# Patient Record
Sex: FEMALE | Race: WHITE | NOT HISPANIC OR LATINO | Employment: FULL TIME | URBAN - METROPOLITAN AREA
[De-identification: names, ages, dates, MRNs, and addresses within clinical notes are randomized per-mention and may not be internally consistent; named-entity substitution may affect disease eponyms.]

---

## 2017-01-03 RX ORDER — LEVOTHYROXINE SODIUM 88 UG/1
TABLET ORAL
Qty: 30 TABLET | Refills: 0 | Status: SHIPPED | OUTPATIENT
Start: 2017-01-03 | End: 2017-01-06 | Stop reason: SDUPTHER

## 2017-01-03 RX ORDER — VALACYCLOVIR HYDROCHLORIDE 1 G/1
TABLET, FILM COATED ORAL
Qty: 90 TABLET | Refills: 0 | Status: SHIPPED | OUTPATIENT
Start: 2017-01-03 | End: 2017-03-25 | Stop reason: SDUPTHER

## 2017-01-06 ENCOUNTER — TELEPHONE (OUTPATIENT)
Dept: FAMILY MEDICINE CLINIC | Facility: CLINIC | Age: 47
End: 2017-01-06

## 2017-01-06 RX ORDER — LEVOTHYROXINE SODIUM 112 UG/1
112 TABLET ORAL DAILY
Qty: 30 TABLET | Refills: 1 | Status: SHIPPED | OUTPATIENT
Start: 2017-01-06 | End: 2017-01-31 | Stop reason: SDUPTHER

## 2017-01-06 NOTE — TELEPHONE ENCOUNTER
Yes, will increase her dose from 88 to 112 and then I want to see her in 6 weeks please for an appointment and recheck.  New script sent in

## 2017-01-06 NOTE — TELEPHONE ENCOUNTER
----- Message from Tosha Garza sent at 1/6/2017  9:58 AM EST -----  Contact: KRISH INMAN   PATIENT CALLED TO ADVISE SHE SAW ONCOLOGIST HER THYROID LEVEL HAS CHANGED NOW 11.91 SHE IS QUESTIONING IF SHE SHOULD CHANGE HER MEDICATION I HAVE ORDERED THE LABS AND OFFICE NOTES FROM   [facility]RISTY JO IN [place] AT  SHOULD BE SENT OVER SHORTLY PATIENT CAN BE REACHED  [phone]

## 2017-01-16 ENCOUNTER — OFFICE VISIT (OUTPATIENT)
Dept: OBSTETRICS AND GYNECOLOGY | Facility: CLINIC | Age: 47
End: 2017-01-16

## 2017-01-16 VITALS
BODY MASS INDEX: 21.43 KG/M2 | SYSTOLIC BLOOD PRESSURE: 112 MMHG | HEIGHT: 65 IN | DIASTOLIC BLOOD PRESSURE: 70 MMHG | WEIGHT: 128.6 LBS

## 2017-01-16 DIAGNOSIS — Z79.890 POST-MENOPAUSE ON HRT (HORMONE REPLACEMENT THERAPY): Primary | ICD-10-CM

## 2017-01-16 DIAGNOSIS — M85.80 OSTEOPENIA: ICD-10-CM

## 2017-01-16 DIAGNOSIS — N60.12 FIBROCYSTIC BREAST CHANGES, BILATERAL: ICD-10-CM

## 2017-01-16 DIAGNOSIS — N60.11 FIBROCYSTIC BREAST CHANGES, BILATERAL: ICD-10-CM

## 2017-01-16 DIAGNOSIS — Z01.419 ENCOUNTER FOR GYNECOLOGICAL EXAMINATION WITHOUT ABNORMAL FINDING: ICD-10-CM

## 2017-01-16 PROCEDURE — 99396 PREV VISIT EST AGE 40-64: CPT | Performed by: OBSTETRICS & GYNECOLOGY

## 2017-01-16 NOTE — MR AVS SNAPSHOT
Ivis Mcginnis   1/16/2017 1:00 PM   Office Visit    Dept Phone:  [phone]   Encounter #:  [id number]    Provider:  Jose Avila MD   Department:  [facility]                Your Full Care Plan              Today's Medication Changes          These changes are accurate as of: 1/16/17  1:46 PM.  If you have any questions, ask your nurse or doctor.               Stop taking medication(s)listed here:     PREMARIN 0.45 MG tablet   Generic drug:  estrogens (conjugated)   Stopped by:  Jose Avila MD                      Your Updated Medication List          This list is accurate as of: 1/16/17  1:46 PM.  Always use your most recent med list.                aspirin 81 MG chewable tablet       diclofenac 75 MG EC tablet   Commonly known as:  VOLTAREN   Take 1 tablet by mouth 2 (Two) Times a Day.       diphenhydrAMINE 25 mg capsule   Commonly known as:  BENADRYL       FLUZONE QUADRIVALENT 0.5 ML suspension prefilled syringe injection   Generic drug:  influenza vac split quad       gabapentin 600 MG tablet   Commonly known as:  NEURONTIN       levothyroxine 112 MCG tablet   Commonly known as:  SYNTHROID, LEVOTHROID   Take 1 tablet by mouth Daily.       LORATADINE-D 24HR  MG per 24 hr tablet   Generic drug:  loratadine-pseudoephedrine   TAKE ONE TABLET BY MOUTH ONCE DAILY       potassium phosphate (monobasic) 500 MG tablet   Commonly known as:  K-PHOS       topiramate 50 MG tablet   Commonly known as:  TOPAMAX   TAKE ONE TABLET BY MOUTH TWICE DAILY       valACYclovir 1000 MG tablet   Commonly known as:  VALTREX   TAKE ONE TABLET BY MOUTH ONCE DAILY       Vitamin D 2000 UNITS capsule               You Were Diagnosed With        Codes Comments    Post-menopause on HRT (hormone replacement therapy)    -  Primary ICD-10-CM: Z79.890  ICD-9-CM: V07.4     Osteopenia     ICD-10-CM: M85.80  ICD-9-CM: 733.90     Fibrocystic breast changes,  "bilateral     ICD-10-CM: N60.11, N60.12  ICD-9-CM: 610.1     Encounter for gynecological examination without abnormal finding     ICD-10-CM: Z01.419  ICD-9-CM: V72.31       Instructions     None    Patient Instructions History      Upcoming Appointments     Visit Type Date Time Department    ANNUAL 2017  1:00 PM [facility]      [facility] Signup     [facility] allows you to send messages to your doctor, view your test results, renew your prescriptions, schedule appointments, and more. To sign up, go to [employer] and click on the Sign Up Now link in the New User? box. Enter your [employer] Activation Code exactly as it appears below along with the last four digits of your Social Security Number and your Date of Birth () to complete the sign-up process. If you do not sign up before the expiration date, you must request a new code.    [employer] Activation Code: [email or web address]  Expires: 2017  1:46 PM    If you have questions, you can email [employer]@"Hey, Neighbor!" or call [phone] to talk to our [employer] staff. Remember, [employer] is NOT to be used for urgent needs. For medical emergencies, dial 911.               Other Info from Your Visit           Allergies     Pentamidine      Piperacillin Sod-tazobactam So      Zosin      Reason for Visit     Gynecologic Exam     Med Refill           Vital Signs     Blood Pressure Height Weight Body Mass Index Smoking Status       112/70 65\" (165.1 cm) 128 lb 9.6 oz (58.3 kg) 21.4 kg/m2 Never Smoker       Problems and Diagnoses Noted     Painful lumpy breasts    History of recurrent UTIs    Leukemia    Migraines    Bone disease    Need for postmenopausal hormone replacement    Encounter for routine gynecological examination          No Longer an Issue     Difficult or painful urination        "

## 2017-01-16 NOTE — LETTER
"2017     DOMITILA Hernandez  [facility] [address] [facility] [address]    Patient: Ivis Mcginnis   YOB: 1970   Date of Visit: 2017       Dear DOMITILA Hyman:    Thank you for referring Ivis Mcginnis to me for evaluation. Below are the relevant portions of my assessment and plan of care.    If you have questions, please do not hesitate to call me. I look forward to following Ivis along with you.         Sincerely,        DOROTHEA Avila MD        CC: No Recipients  DOROTHEA Avila MD  2017  1:53 PM  Signed     Chief Complaint   Patient presents with   • Gynecologic Exam   • Med Refill       Ivis Mcginnis is a 46 y.o. year old  presenting to be seen for her annual exam.  This patient has previously had an LAVH.  She is monitored for a history of acute myelogenous leukemia at the [facility].  She has a history of osteopenia of the hips and fibrocystic changes of the breast.  She is currently taking 0.9 mg of Premarin daily and is asymptomatic.    SCREENING TESTS    Year 2012 2016 2017 2019 2020   Age                         PAP Neg.                        HPV high risk                         Mammogram     benign                    NILO score                         Breast MRI                         Lipids                         Vitamin D                         Colonoscopy                         DEXA  Frax (hip/any)    osteopenia hip                     Ovarian Screen                           Enter the month test was performed.  If month not known, enter \"X'  · Black numbers = normal results  · Red numbers = abnormal results  · Black X = patient reported normal  · Red X - patient reported abnormal      Referred by:    Profession:    Other info:         History   Sexual Activity   • Sexual activity: Yes   • Partners: Male   • Birth control/ protection: Surgical "     Comment:     She would not like to be screened for STD's at today's exam.     She exercises regularly: yes.  She wears her seat belt: yes.  She has concerns about domestic violence: no.  She has noticed changes in height: no    GYN screening history:  · Last mammogram: was done on approximately 1/18/2016 and the result was: Birads II (Benign findings).  · Last DEXA: was done on approximately 6/15/2015 and the results were: osteopenia of hips.    No Additional Complaints Reported    The following portions of the patient's history were reviewed and updated as appropriate:vital signs and   She  does not have any pertinent problems on file.  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  Her family history includes Hypertension in her father; Kidney disease in her mother.  She  reports that she has never smoked. She does not have any smokeless tobacco history on file. She reports that she does not drink alcohol or use illicit drugs.  Current Outpatient Prescriptions   Medication Sig Dispense Refill   • Cholecalciferol (VITAMIN D) 2000 UNITS capsule Take  by mouth.     • LORATADINE-D 24HR  MG per 24 hr tablet TAKE ONE TABLET BY MOUTH ONCE DAILY 90 tablet 0   • potassium phosphate, monobasic, (K-PHOS) 500 MG tablet Take 500 mg by mouth 2 (two) times a day.     • valACYclovir (VALTREX) 1000 MG tablet TAKE ONE TABLET BY MOUTH ONCE DAILY 90 tablet 0   • aspirin 81 MG chewable tablet Chew 81 mg Every 3 (Three) Days.     • diclofenac (VOLTAREN) 75 MG EC tablet Take 1 tablet by mouth 2 (Two) Times a Day. 60 tablet 1   • diphenhydrAMINE (BENADRYL) 25 mg capsule Take 25 mg by mouth every 6 (six) hours as needed for itching.     • estrogens, conjugated, (PREMARIN) 0.9 MG tablet Take 1 tablet by mouth Daily. 90 tablet 3   • FLUZONE QUADRIVALENT 0.5 ML suspension prefilled  "syringe injection      • gabapentin (NEURONTIN) 600 MG tablet Take  by mouth 2 (two) times a day.     • levothyroxine (SYNTHROID, LEVOTHROID) 112 MCG tablet Take 1 tablet by mouth Daily. 30 tablet 1   • topiramate (TOPAMAX) 50 MG tablet TAKE ONE TABLET BY MOUTH TWICE DAILY (Patient taking differently: TAKE ONE TABLET by oral route daily) 180 tablet 0     No current facility-administered medications for this visit.      She is allergic to pentamidine and piperacillin sod-tazobactam so..    Review of Systems  A comprehensive review of systems was negative.  Constitutional: negative for fever, chills, activity change, appetite change, fatigue and unexpected weight change.  Respiratory: negative  Cardiovascular: negative  Gastrointestinal: negative  Genitourinary:negative  Musculoskeletal:positive for right chest wall pain  Behavioral/Psych: negative       Visit Vitals   • /70   • Ht 65\" (165.1 cm)   • Wt 128 lb 9.6 oz (58.3 kg)   • BMI 21.4 kg/m2       Physical Exam    General:  alert; cooperative; well developed; well nourished   Skin:  No suspicious lesions seen   Thyroid: normal to inspection and palpation   Lungs:  clear to auscultation bilaterally   Heart:  regular rate and rhythm, S1, S2 normal, no murmur, click, rub or gallop   Breasts:  Examined in supine position  Symmetric without masses or skin dimpling  Nipples normal without inversion, lesions or discharge  There are no palpable axillary nodes  Fibrocystic changes are present both breasts without a discrete mass   Abdomen: soft, non-tender; no masses  no umbilical or inginual hernias are present  no hepato-splenomegaly   Pelvis: Clinical staff was present for exam  External genitalia:  normal appearance of the external genitalia including Bartholin's and [place]'s glands.  Vaginal:  normal pink mucosa without prolapse or lesions.  Cervix:  absent.  Uterus:  absent.  Adnexa:  normal bimanual exam of the adnexa.  Rectal:  anus visually normal appearing. " recto-vaginal exam unremarkable and confirms findings;     Lab Review   No data reviewed    Imaging  Mammogram results and DEXA         ASSESSMENT  Problems Addressed this Visit        Musculoskeletal and Integument    Osteopenia       Genitourinary    Post-menopause on HRT (hormone replacement therapy) - Primary    Relevant Medications    estrogens, conjugated, (PREMARIN) 0.9 MG tablet    Other Relevant Orders    DEXA Bone Density Axial       Other    Fibrocystic breast changes, bilateral      Other Visit Diagnoses     Encounter for gynecological examination without abnormal finding        Relevant Orders    Liquid-based Pap Smear, Screening    DEXA Bone Density Axial          PLAN    Medications prescribed this encounter:    New Medications Ordered This Visit   Medications   • estrogens, conjugated, (PREMARIN) 0.9 MG tablet     Sig: Take 1 tablet by mouth Daily.     Dispense:  90 tablet     Refill:  3   · Pap test done  · DEXA ordered for June 27,2017  · Calcium, 600 mg/ Vit. D, 400 IU daily; regular weight-bearing exercise  · Follow up: 12 month(s)  *Please note that portions of this documentation may have been completed with a voice recognition program.  Efforts were made to edit this dictation, but occasional words may have been mistranscribed.       This note was electronically signed.    DOROTHEA Avila MD  January 16, 2017  1:49 PM

## 2017-01-24 ENCOUNTER — OFFICE VISIT (OUTPATIENT)
Dept: FAMILY MEDICINE CLINIC | Facility: CLINIC | Age: 47
End: 2017-01-24

## 2017-01-24 VITALS
DIASTOLIC BLOOD PRESSURE: 66 MMHG | WEIGHT: 129 LBS | RESPIRATION RATE: 18 BRPM | BODY MASS INDEX: 21.47 KG/M2 | TEMPERATURE: 97.8 F | HEART RATE: 76 BPM | SYSTOLIC BLOOD PRESSURE: 108 MMHG

## 2017-01-24 DIAGNOSIS — J06.9 ACUTE URI: ICD-10-CM

## 2017-01-24 DIAGNOSIS — J04.0 LARYNGITIS: ICD-10-CM

## 2017-01-24 DIAGNOSIS — J02.9 ACUTE PHARYNGITIS, UNSPECIFIED ETIOLOGY: Primary | ICD-10-CM

## 2017-01-24 LAB
EXPIRATION DATE: NORMAL
INTERNAL CONTROL: NORMAL
Lab: NORMAL
S PYO AG THROAT QL: NEGATIVE

## 2017-01-24 PROCEDURE — 99213 OFFICE O/P EST LOW 20 MIN: CPT | Performed by: PHYSICIAN ASSISTANT

## 2017-01-24 PROCEDURE — 87880 STREP A ASSAY W/OPTIC: CPT | Performed by: PHYSICIAN ASSISTANT

## 2017-01-24 RX ORDER — CEFDINIR 300 MG/1
300 CAPSULE ORAL 2 TIMES DAILY
Qty: 20 CAPSULE | Refills: 0 | Status: SHIPPED | OUTPATIENT
Start: 2017-01-24 | End: 2017-01-30

## 2017-01-24 NOTE — MR AVS SNAPSHOT
Ivis Mcginnis   1/24/2017 8:30 AM   Office Visit    Dept Phone:  [phone]   Encounter #:  [id number]    Provider:  GAYLE Acosta   Department:  [facility]                Your Full Care Plan              Today's Medication Changes          These changes are accurate as of: 1/24/17  9:00 AM.  If you have any questions, ask your nurse or doctor.               New Medication(s)Ordered:     cefdinir 300 MG capsule   Commonly known as:  OMNICEF   Take 1 capsule by mouth 2 (Two) Times a Day.         Stop taking medication(s)listed here:     FLUZONE QUADRIVALENT 0.5 ML suspension prefilled syringe injection   Generic drug:  influenza vac split quad           gabapentin 600 MG tablet   Commonly known as:  NEURONTIN                Where to Get Your Medications      These medications were sent to [facility] [address] [place] [address] [address] [place] [address] 7 AT [facility] [phone] [facility] [phone]   [address] [place] [address] [address] [address]     Phone:  [phone]     cefdinir 300 MG capsule                  Your Updated Medication List          This list is accurate as of: 1/24/17  9:00 AM.  Always use your most recent med list.                aspirin 81 MG chewable tablet       cefdinir 300 MG capsule   Commonly known as:  OMNICEF   Take 1 capsule by mouth 2 (Two) Times a Day.       diclofenac 75 MG EC tablet   Commonly known as:  VOLTAREN   Take 1 tablet by mouth 2 (Two) Times a Day.       diphenhydrAMINE 25 mg capsule   Commonly known as:  BENADRYL       estrogens (conjugated) 0.9 MG tablet   Commonly known as:  PREMARIN   Take 1 tablet by mouth Daily.       levothyroxine 112 MCG tablet   Commonly known as:  SYNTHROID, LEVOTHROID   Take 1 tablet by mouth Daily.       LORATADINE-D 24HR  MG per 24 hr tablet   Generic drug:  loratadine-pseudoephedrine   TAKE ONE TABLET BY MOUTH ONCE DAILY       potassium  phosphate (monobasic) 500 MG tablet   Commonly known as:  K-PHOS       topiramate 50 MG tablet   Commonly known as:  TOPAMAX   TAKE ONE TABLET BY MOUTH TWICE DAILY       valACYclovir 1000 MG tablet   Commonly known as:  VALTREX   TAKE ONE TABLET BY MOUTH ONCE DAILY       Vitamin D 2000 UNITS capsule               We Performed the Following     POC Rapid Strep A       You Were Diagnosed With        Codes Comments    Acute pharyngitis, unspecified etiology    -  Primary ICD-10-CM: J02.9  ICD-9-CM: 462     Laryngitis     ICD-10-CM: J04.0  ICD-9-CM: 464.00     Acute URI     ICD-10-CM: J06.9  ICD-9-CM: 465.9       Instructions     None    Patient Instructions History      Upcoming Appointments     Visit Type Date Time Department    FOLLOW UP 2017  8:30 AM [facility]    ANNUAL 1/15/2018  2:00 PM [facility]      [employer] Signup     [facility] allows you to send messages to your doctor, view your test results, renew your prescriptions, schedule appointments, and more. To sign up, go to [employer] and click on the Sign Up Now link in the New User? box. Enter your [facility] Activation Code exactly as it appears below along with the last four digits of your Social Security Number and your Date of Birth () to complete the sign-up process. If you do not sign up before the expiration date, you must request a new code.    [facility] Activation Code: [email or web address]  Expires: 2017  1:46 PM    If you have questions, you can email [email or web address] or call [phone] to talk to our [facility] staff. Remember, [facility] is NOT to be used for urgent needs. For medical emergencies, dial 911.               Other Info from Your Visit           Your Appointments     Chandana 15, 2018  2:00 PM [facility]   Annual with Jose Avila MD   [facility] (--)    [address] [facility] [address] [place] [id number]   [phone]               Allergies     Pentamidine      Piperacillin Sod-tazobactam So      Zosin      Reason for Visit     Laryngitis  was sick and getting over it this last week.     Cough           Vital Signs     Blood Pressure Pulse Temperature Respirations Weight Body Mass Index    108/66 76 97.8 °F (36.6 °C) 18 129 lb (58.5 kg) 21.47 kg/m2    Smoking Status                   Never Smoker           Problems and Diagnoses Noted     Acute sore throat    Acute upper respiratory infection    Laryngitis

## 2017-01-24 NOTE — LETTER
January 24, 2017     Patient: Ivis Mcginnis   YOB: 1970   Date of Visit: 1/24/2017       To Whom It May Concern:    It is my medical opinion that Ivis Mcginnis may return to work in two days.         Sincerely,        GAYLE Acosta    CC: No Recipients

## 2017-01-24 NOTE — PROGRESS NOTES
Farhan Mcginnis is a 46 y.o. female.     Laryngitis   This is a new problem. The current episode started in the past 7 days. The problem occurs constantly. The problem has been gradually worsening. Associated symptoms include congestion, coughing, fatigue, nausea, a sore throat and swollen glands. Pertinent negatives include no abdominal pain, anorexia, arthralgias, change in bowel habit, chest pain, chills, diaphoresis, fever, headaches, joint swelling, myalgias, neck pain, numbness, rash, urinary symptoms, vertigo, visual change, vomiting or weakness. The symptoms are aggravated by coughing. She has tried rest and drinking for the symptoms. The treatment provided no relief.   Cough   This is a new problem. The current episode started in the past 7 days. The problem has been gradually worsening. The cough is productive of sputum. Associated symptoms include a sore throat. Pertinent negatives include no chest pain, chills, ear congestion, ear pain, fever, headaches, heartburn, hemoptysis, myalgias, nasal congestion, postnasal drip, rash, rhinorrhea, shortness of breath, sweats, weight loss or wheezing. Nothing aggravates the symptoms. She has tried nothing for the symptoms.   Sore Throat    This is a new problem. The current episode started in the past 7 days. The problem has been gradually worsening. Neither side of throat is experiencing more pain than the other. There has been no fever. The pain is at a severity of 2/10. The pain is mild. Associated symptoms include congestion, coughing, a hoarse voice, swollen glands and trouble swallowing. Pertinent negatives include no abdominal pain, diarrhea, drooling, ear discharge, ear pain, headaches, plugged ear sensation, neck pain, shortness of breath, stridor or vomiting. She has had no exposure to strep or mono. She has tried cool liquids for the symptoms. The treatment provided no relief.        The following portions of the patient's history were reviewed  and updated as appropriate: allergies, current medications, past family history, past medical history, past social history, past surgical history and problem list.    Review of Systems   Constitutional: Positive for fatigue. Negative for chills, diaphoresis, fever and weight loss.   HENT: Positive for congestion, hoarse voice, sore throat and trouble swallowing. Negative for drooling, ear discharge, ear pain, hearing loss, nosebleeds, postnasal drip, rhinorrhea, sinus pressure and sneezing.    Eyes: Negative.    Respiratory: Positive for cough. Negative for hemoptysis, chest tightness, shortness of breath, wheezing and stridor.    Cardiovascular: Negative.  Negative for chest pain, palpitations and leg swelling.   Gastrointestinal: Positive for nausea. Negative for abdominal distention, abdominal pain, anal bleeding, anorexia, blood in stool, change in bowel habit, constipation, diarrhea, heartburn, rectal pain and vomiting.   Endocrine: Negative.  Negative for cold intolerance, heat intolerance, polydipsia, polyphagia and polyuria.   Genitourinary: Negative.  Negative for difficulty urinating, dysuria, flank pain, frequency, hematuria and urgency.   Musculoskeletal: Negative.  Negative for arthralgias, back pain, gait problem, joint swelling, myalgias, neck pain and neck stiffness.   Skin: Negative.  Negative for color change, pallor, rash and wound.   Allergic/Immunologic: Negative.  Negative for immunocompromised state.   Neurological: Negative for dizziness, vertigo, syncope, weakness, light-headedness, numbness and headaches.   Hematological: Negative.  Negative for adenopathy. Does not bruise/bleed easily.   Psychiatric/Behavioral: Negative.  Negative for behavioral problems, confusion, self-injury, sleep disturbance and suicidal ideas. The patient is not nervous/anxious.        Objective    Blood pressure 108/66, pulse 76, temperature 97.8 °F (36.6 °C), resp. rate 18, weight 129 lb (58.5 kg).     Physical  Exam   Constitutional: She is oriented to person, place, and time. She appears well-developed and well-nourished.   HENT:   Head: Normocephalic and atraumatic.   Right Ear: External ear and ear canal normal. Tympanic membrane is retracted. Tympanic membrane is not perforated, not erythematous and not bulging.   Left Ear: External ear and ear canal normal. Tympanic membrane is not perforated, not erythematous and not bulging.   Nose: Mucosal edema present. No rhinorrhea. Right sinus exhibits no maxillary sinus tenderness and no frontal sinus tenderness. Left sinus exhibits no maxillary sinus tenderness and no frontal sinus tenderness.   Mouth/Throat: Uvula is midline and mucous membranes are normal. Posterior oropharyngeal erythema present. No oropharyngeal exudate or posterior oropharyngeal edema.   Hoarse voice    Eyes: Conjunctivae and EOM are normal. Pupils are equal, round, and reactive to light.   Neck: Normal range of motion. Neck supple. No tracheal deviation present. No thyromegaly present.   Cardiovascular: Normal rate, regular rhythm, normal heart sounds and intact distal pulses.  Exam reveals no gallop and no friction rub.    No murmur heard.  Pulmonary/Chest: Effort normal and breath sounds normal. No respiratory distress. She has no wheezes. She has no rales. She exhibits no tenderness.   Abdominal: Soft. Bowel sounds are normal. She exhibits no distension and no mass. There is no tenderness. There is no rebound and no guarding. No hernia.   Lymphadenopathy:     She has no cervical adenopathy.   Neurological: She is alert and oriented to person, place, and time.   Skin: Skin is warm and dry.   Psychiatric: She has a normal mood and affect. Her behavior is normal. Judgment and thought content normal.   Nursing note and vitals reviewed.      Assessment/Plan   Ivis was seen today for laryngitis and cough.    Diagnoses and all orders for this visit:    Acute pharyngitis, unspecified etiology  -     POC  Rapid Strep A  -     cefdinir (OMNICEF) 300 MG capsule; Take 1 capsule by mouth 2 (Two) Times a Day.    Laryngitis    Acute URI    Strep screen negative   Continue symptomatic treatment as discussed. Due to hx of AML and propensity of frequent infections, may begin antibiotic in the next 24-48 hours if symptoms are not improving or if new or worsening symptoms develop. Pt agrees.

## 2017-01-30 ENCOUNTER — OFFICE VISIT (OUTPATIENT)
Dept: FAMILY MEDICINE CLINIC | Facility: CLINIC | Age: 47
End: 2017-01-30

## 2017-01-30 VITALS
RESPIRATION RATE: 16 BRPM | DIASTOLIC BLOOD PRESSURE: 74 MMHG | BODY MASS INDEX: 21.33 KG/M2 | TEMPERATURE: 97.5 F | WEIGHT: 128 LBS | SYSTOLIC BLOOD PRESSURE: 110 MMHG | HEART RATE: 74 BPM | HEIGHT: 65 IN

## 2017-01-30 DIAGNOSIS — E55.9 VITAMIN D DEFICIENCY: ICD-10-CM

## 2017-01-30 DIAGNOSIS — E03.9 HYPOTHYROIDISM, UNSPECIFIED TYPE: Primary | ICD-10-CM

## 2017-01-30 PROBLEM — J02.9 ACUTE PHARYNGITIS: Status: RESOLVED | Noted: 2017-01-24 | Resolved: 2017-01-30

## 2017-01-30 PROBLEM — J04.0 LARYNGITIS: Status: RESOLVED | Noted: 2017-01-24 | Resolved: 2017-01-30

## 2017-01-30 PROBLEM — J06.9 ACUTE URI: Status: RESOLVED | Noted: 2017-01-24 | Resolved: 2017-01-30

## 2017-01-30 LAB
25(OH)D3+25(OH)D2 SERPL-MCNC: 37.6 NG/ML
ALBUMIN SERPL-MCNC: 4.3 G/DL (ref 3.2–4.8)
ALBUMIN/GLOB SERPL: 1.5 G/DL (ref 1.5–2.5)
ALP SERPL-CCNC: 52 U/L (ref 25–100)
ALT SERPL-CCNC: 20 U/L (ref 7–40)
AST SERPL-CCNC: 21 U/L (ref 0–33)
BASOPHILS # BLD AUTO: 0.03 10*3/MM3 (ref 0–0.2)
BASOPHILS NFR BLD AUTO: 0.5 % (ref 0–1)
BILIRUB SERPL-MCNC: 0.3 MG/DL (ref 0.3–1.2)
BUN SERPL-MCNC: 18 MG/DL (ref 9–23)
BUN/CREAT SERPL: 22.5 (ref 7–25)
CALCIUM SERPL-MCNC: 10.4 MG/DL (ref 8.7–10.4)
CHLORIDE SERPL-SCNC: 103 MMOL/L (ref 99–109)
CO2 SERPL-SCNC: 31 MMOL/L (ref 20–31)
CREAT SERPL-MCNC: 0.8 MG/DL (ref 0.6–1.3)
DIFFERENTIAL COMMENT: NORMAL
EOSINOPHIL # BLD AUTO: 0.09 10*3/MM3 (ref 0.1–0.3)
EOSINOPHIL NFR BLD AUTO: 1.6 % (ref 0–3)
ERYTHROCYTE [DISTWIDTH] IN BLOOD BY AUTOMATED COUNT: 12.2 % (ref 11.3–14.5)
GLOBULIN SER CALC-MCNC: 2.8 GM/DL
GLUCOSE SERPL-MCNC: 82 MG/DL (ref 70–100)
HCT VFR BLD AUTO: 39.1 % (ref 34.5–44)
HGB BLD-MCNC: 13.5 G/DL (ref 11.5–15.5)
IMM GRANULOCYTES # BLD: 0.02 10*3/MM3 (ref 0–0.03)
IMM GRANULOCYTES NFR BLD: 0.3 % (ref 0–0.6)
LYMPHOCYTES # BLD AUTO: 2.07 10*3/MM3 (ref 0.6–4.8)
LYMPHOCYTES NFR BLD AUTO: 35.9 % (ref 24–44)
MCH RBC QN AUTO: 37.6 PG (ref 27–31)
MCHC RBC AUTO-ENTMCNC: 34.5 G/DL (ref 32–36)
MCV RBC AUTO: 108.9 FL (ref 80–99)
MONOCYTES # BLD AUTO: 0.55 10*3/MM3 (ref 0–1)
MONOCYTES NFR BLD AUTO: 9.5 % (ref 0–12)
NEUTROPHILS # BLD AUTO: 3 10*3/MM3 (ref 1.5–8.3)
NEUTROPHILS NFR BLD AUTO: 52.2 % (ref 41–71)
PLATELET # BLD AUTO: 192 10*3/MM3 (ref 150–450)
PLATELET BLD QL SMEAR: NORMAL
POTASSIUM SERPL-SCNC: 4.1 MMOL/L (ref 3.5–5.5)
PROT SERPL-MCNC: 7.1 G/DL (ref 5.7–8.2)
RBC # BLD AUTO: 3.59 10*6/MM3 (ref 3.89–5.14)
RBC MORPH BLD: NORMAL
SODIUM SERPL-SCNC: 140 MMOL/L (ref 132–146)
T4 FREE SERPL-MCNC: 1.48 NG/DL (ref 0.89–1.76)
TSH SERPL DL<=0.005 MIU/L-ACNC: 0.23 MIU/ML (ref 0.35–5.35)
WBC # BLD AUTO: 5.76 10*3/MM3 (ref 3.5–10.8)

## 2017-01-30 PROCEDURE — 99213 OFFICE O/P EST LOW 20 MIN: CPT | Performed by: FAMILY MEDICINE

## 2017-01-30 NOTE — PROGRESS NOTES
Subjective   Ivis Mcginnis is a 46 y.o. female.     History of Present Illness     last week she was ill but feeling better  She as wondering if her thyroid was off, even prior to this though  She has been jittery, no palps, hard to fall asleep  Very tired feeling as well  Seemed to be more prevalent at night    She wakes up at night feeling shaky, sweaty    We changed her dose in December with her level from [facility]    The following portions of the patient's history were reviewed and updated as appropriate: allergies, current medications, past family history, past medical history, past social history, past surgical history and problem list.    Review of Systems   Constitutional: Negative.        Objective   Physical Exam   Constitutional: She appears well-developed and well-nourished. No distress.   Neck: Thyromegaly present.   Cardiovascular: Normal rate, regular rhythm and normal heart sounds.    Pulmonary/Chest: Effort normal and breath sounds normal.   Psychiatric: She has a normal mood and affect. Her behavior is normal.   Nursing note and vitals reviewed.      Assessment/Plan   Ivis was seen today for hypothyroidism.    Diagnoses and all orders for this visit:    Hypothyroidism, unspecified type  -     TSH+Free T4  -     Comprehensive Metabolic Panel  -     CBC & Differential    Vitamin D deficiency  -     Vitamin D 25 Hydroxy    I am concerned that she is getting too much thyroid based on her symptoms, maybe the [facility] labs results were false and we incorrectly increased her synthroid. I am unable to find the lab results from Dec today.  Will recheck and f/u pending labs

## 2017-01-30 NOTE — MR AVS SNAPSHOT
Ivis Mcginnis   1/30/2017 12:15 PM   Office Visit    Dept Phone:  [phone]   Encounter #:  [id number]    Provider:  Sajan Odom MD   Department:  [facility]                Your Full Care Plan              Today's Medication Changes          These changes are accurate as of: 1/30/17 12:40 PM.  If you have any questions, ask your nurse or doctor.               Stop taking medication(s)listed here:     cefdinir 300 MG capsule   Commonly known as:  OMNICEF   Stopped by:  Sajan Odom MD           diclofenac 75 MG EC tablet   Commonly known as:  VOLTAREN   Stopped by:  Sajan Odom MD           diphenhydrAMINE 25 mg capsule   Commonly known as:  BENADRYL   Stopped by:  Sajan Odom MD                      Your Updated Medication List          This list is accurate as of: 1/30/17 12:40 PM.  Always use your most recent med list.                aspirin 81 MG chewable tablet       estrogens (conjugated) 0.9 MG tablet   Commonly known as:  PREMARIN   Take 1 tablet by mouth Daily.       levothyroxine 112 MCG tablet   Commonly known as:  SYNTHROID, LEVOTHROID   Take 1 tablet by mouth Daily.       LORATADINE-D 24HR  MG per 24 hr tablet   Generic drug:  loratadine-pseudoephedrine   TAKE ONE TABLET BY MOUTH ONCE DAILY       potassium phosphate (monobasic) 500 MG tablet   Commonly known as:  K-PHOS       topiramate 50 MG tablet   Commonly known as:  TOPAMAX   TAKE ONE TABLET BY MOUTH TWICE DAILY       valACYclovir 1000 MG tablet   Commonly known as:  VALTREX   TAKE ONE TABLET BY MOUTH ONCE DAILY       Vitamin D 2000 UNITS capsule               We Performed the Following     CBC & Differential     Comprehensive Metabolic Panel     TSH+Free T4     Vitamin D 25 Hydroxy       You Were Diagnosed With        Codes Comments    Hypothyroidism, unspecified type    -  Primary ICD-10-CM: E03.9  ICD-9-CM: 244.9     Vitamin D deficiency     ICD-10-CM: E55.9  ICD-9-CM:  "268.9       Instructions     None    Patient Instructions History      Upcoming Appointments     Visit Type Date Time Department    OFFICE VISIT 2017 12:15 PM [facility]    ANNUAL 1/15/2018  2:00 PM [facility]      [employer] Signup     [facility] [employer] allows you to send messages to your doctor, view your test results, renew your prescriptions, schedule appointments, and more. To sign up, go to [employer] and click on the Sign Up Now link in the New User? box. Enter your [employer] Activation Code exactly as it appears below along with the last four digits of your Social Security Number and your Date of Birth () to complete the sign-up process. If you do not sign up before the expiration date, you must request a new code.    [employer] Activation Code: [email or web address]  Expires: 2017  1:46 PM    If you have questions, you can email [email or web address] or call [phone] to talk to our [employer] staff. Remember, [employer] is NOT to be used for urgent needs. For medical emergencies, dial 911.               Other Info from Your Visit           Your Appointments     Chandana 15, 2018  2:00 PM EST   Annual with Jose Avila MD   [facility] (--)    [address] [place] [id number]   [phone]              Allergies     Pentamidine      Piperacillin Sod-tazobactam So      Zosin      Reason for Visit     Hypothyroidism           Vital Signs     Blood Pressure Pulse Temperature Respirations Height Weight    110/74 74 97.5 °F (36.4 °C) 16 65\" (165.1 cm) 128 lb (58.1 kg)    Body Mass Index Smoking Status                21.3 kg/m2 Never Smoker          Problems and Diagnoses Noted     Underactive thyroid    Vitamin D deficiency      No Longer an Issue     Acute sore throat    Acute upper respiratory infection    Laryngitis    Sore throat        "

## 2017-01-31 RX ORDER — LEVOTHYROXINE SODIUM 0.1 MG/1
100 TABLET ORAL DAILY
Qty: 30 TABLET | Refills: 1 | Status: SHIPPED | OUTPATIENT
Start: 2017-01-31 | End: 2017-02-28 | Stop reason: SDUPTHER

## 2017-02-14 ENCOUNTER — OFFICE VISIT (OUTPATIENT)
Dept: FAMILY MEDICINE CLINIC | Facility: CLINIC | Age: 47
End: 2017-02-14

## 2017-02-14 VITALS
DIASTOLIC BLOOD PRESSURE: 82 MMHG | RESPIRATION RATE: 18 BRPM | SYSTOLIC BLOOD PRESSURE: 118 MMHG | TEMPERATURE: 98.6 F | WEIGHT: 128 LBS | BODY MASS INDEX: 21.33 KG/M2 | HEART RATE: 74 BPM | HEIGHT: 65 IN

## 2017-02-14 DIAGNOSIS — J06.9 ACUTE URI: Primary | ICD-10-CM

## 2017-02-14 PROCEDURE — 99213 OFFICE O/P EST LOW 20 MIN: CPT | Performed by: FAMILY MEDICINE

## 2017-02-14 RX ORDER — AZITHROMYCIN 250 MG/1
TABLET, FILM COATED ORAL
Qty: 6 TABLET | Refills: 0 | Status: SHIPPED | OUTPATIENT
Start: 2017-02-14 | End: 2017-05-01

## 2017-02-14 NOTE — PROGRESS NOTES
Subjective   Ivis Mcginnis is a 46 y.o. female.     History of Present Illness     For the last 4-5 days started with congestion and enlarged lymph nodes  Then developed cough and congestion  No SOA and no fever  No OTC medications to this point    We did make an adjustment in her thyroid dose 2 weeks ago    Review of Systems   Constitutional: Negative for fever.   HENT: Positive for congestion.        Objective   Physical Exam   Constitutional: She appears well-developed and well-nourished. No distress.   HENT:   Head: Normocephalic and atraumatic.   Right Ear: Tympanic membrane, external ear and ear canal normal.   Left Ear: Tympanic membrane, external ear and ear canal normal.   Nose: Nose normal.   Mouth/Throat: Uvula is midline, oropharynx is clear and moist and mucous membranes are normal.   Cardiovascular: Normal rate, regular rhythm and normal heart sounds.    Pulmonary/Chest: Effort normal and breath sounds normal.   Lymphadenopathy:        Head (right side): No preauricular and no posterior auricular adenopathy present.        Head (left side): No preauricular and no posterior auricular adenopathy present.     She has cervical adenopathy.        Right cervical: No superficial cervical, no deep cervical and no posterior cervical adenopathy present.       Left cervical: Superficial cervical (under her left jaw line) adenopathy present. No deep cervical and no posterior cervical adenopathy present.   Psychiatric: She has a normal mood and affect. Her behavior is normal.   Nursing note and vitals reviewed.      Assessment/Plan   Ivis was seen today for uri.    Diagnoses and all orders for this visit:    Acute URI  -     azithromycin (ZITHROMAX) 250 MG tablet; Take 2 tablets the first day, then 1 tablet daily for 4 days.  -     Chlorcyclizine-Pseudoephed 25-60 MG tablet; 1/2-1 po q 8 hours PRN    she also expresses concerns that her ENT had about recurrent lymph tissue under the left jaw line.  I will request  these records for review but I did not feel anything suspicious today and had recent CBC last month.  Will treat with stahist and Abx for her symptoms. May be overkill but will be aggressive due to LA that is recurrent.

## 2017-02-27 ENCOUNTER — TELEPHONE (OUTPATIENT)
Dept: FAMILY MEDICINE CLINIC | Facility: CLINIC | Age: 47
End: 2017-02-27

## 2017-02-27 DIAGNOSIS — E03.9 HYPOTHYROIDISM, UNSPECIFIED TYPE: Primary | ICD-10-CM

## 2017-02-27 DIAGNOSIS — E55.9 VITAMIN D DEFICIENCY: ICD-10-CM

## 2017-02-27 NOTE — TELEPHONE ENCOUNTER
----- Message from Tosha Garza sent at 2/27/2017  9:56 AM EST -----  Contact: KRISH INMAN  PATIENT IS REQUESTING LABS BE PUT IN TWO WEEKS  EARLY DUE TO THE FOLLOWING SYMPTOMS STILL INSOMNIA. HAIR LOSS  BRITTLE NAILS AND LOOSING A NAIL THE PATIENT CAN BE REACHED  [phone]  OK TO LEAVE [facility]

## 2017-02-28 LAB
25(OH)D3+25(OH)D2 SERPL-MCNC: 37.9 NG/ML
BASOPHILS # BLD AUTO: 0.02 10*3/MM3 (ref 0–0.2)
BASOPHILS NFR BLD AUTO: 0.3 % (ref 0–1)
EOSINOPHIL # BLD AUTO: 0.08 10*3/MM3 (ref 0.1–0.3)
EOSINOPHIL NFR BLD AUTO: 1.3 % (ref 0–3)
ERYTHROCYTE [DISTWIDTH] IN BLOOD BY AUTOMATED COUNT: 12.2 % (ref 11.3–14.5)
FOLATE SERPL-MCNC: >24 NG/ML (ref 3.2–20)
HCT VFR BLD AUTO: 37.7 % (ref 34.5–44)
HGB BLD-MCNC: 13.2 G/DL (ref 11.5–15.5)
IMM GRANULOCYTES # BLD: 0 10*3/MM3 (ref 0–0.03)
IMM GRANULOCYTES NFR BLD: 0 % (ref 0–0.6)
LYMPHOCYTES # BLD AUTO: 2.36 10*3/MM3 (ref 0.6–4.8)
LYMPHOCYTES NFR BLD AUTO: 38.6 % (ref 24–44)
MCH RBC QN AUTO: 38 PG (ref 27–31)
MCHC RBC AUTO-ENTMCNC: 35 G/DL (ref 32–36)
MCV RBC AUTO: 108.6 FL (ref 80–99)
MONOCYTES # BLD AUTO: 0.51 10*3/MM3 (ref 0–1)
MONOCYTES NFR BLD AUTO: 8.3 % (ref 0–12)
NEUTROPHILS # BLD AUTO: 3.15 10*3/MM3 (ref 1.5–8.3)
NEUTROPHILS NFR BLD AUTO: 51.5 % (ref 41–71)
PLATELET # BLD AUTO: 205 10*3/MM3 (ref 150–450)
RBC # BLD AUTO: 3.47 10*6/MM3 (ref 3.89–5.14)
TSH SERPL DL<=0.005 MIU/L-ACNC: 0.15 MIU/ML (ref 0.35–5.35)
VIT B12 SERPL-MCNC: 1095 PG/ML (ref 211–911)
WBC # BLD AUTO: 6.12 10*3/MM3 (ref 3.5–10.8)

## 2017-02-28 RX ORDER — LEVOTHYROXINE SODIUM 88 UG/1
88 TABLET ORAL DAILY
Qty: 30 TABLET | Refills: 1 | Status: SHIPPED | OUTPATIENT
Start: 2017-02-28 | End: 2017-10-12

## 2017-03-01 DIAGNOSIS — E03.9 HYPOTHYROIDISM, UNSPECIFIED TYPE: Primary | ICD-10-CM

## 2017-03-27 ENCOUNTER — TELEPHONE (OUTPATIENT)
Dept: FAMILY MEDICINE CLINIC | Facility: CLINIC | Age: 47
End: 2017-03-27

## 2017-03-27 RX ORDER — TOPIRAMATE 50 MG/1
TABLET, FILM COATED ORAL
Qty: 180 TABLET | Refills: 0 | Status: SHIPPED | OUTPATIENT
Start: 2017-03-27 | End: 2017-08-28 | Stop reason: SDUPTHER

## 2017-03-27 RX ORDER — SCOLOPAMINE TRANSDERMAL SYSTEM 1 MG/1
1 PATCH, EXTENDED RELEASE TRANSDERMAL
Qty: 4 PATCH | Refills: 1 | Status: SHIPPED | OUTPATIENT
Start: 2017-03-27 | End: 2017-05-01

## 2017-03-27 RX ORDER — VALACYCLOVIR HYDROCHLORIDE 1 G/1
TABLET, FILM COATED ORAL
Qty: 90 TABLET | Refills: 0 | Status: SHIPPED | OUTPATIENT
Start: 2017-03-27 | End: 2017-06-25 | Stop reason: SDUPTHER

## 2017-03-27 NOTE — TELEPHONE ENCOUNTER
----- Message from Antonia Zavala sent at 3/27/2017  9:28 AM EDT -----  Contact: Ilene Mills is going on a cruise next week and she would like to request a nausea patch to wear. Please call patient at [phone].

## 2017-05-01 ENCOUNTER — OFFICE VISIT (OUTPATIENT)
Dept: FAMILY MEDICINE CLINIC | Facility: CLINIC | Age: 47
End: 2017-05-01

## 2017-05-01 VITALS
HEIGHT: 65 IN | WEIGHT: 130.2 LBS | TEMPERATURE: 98.4 F | DIASTOLIC BLOOD PRESSURE: 70 MMHG | SYSTOLIC BLOOD PRESSURE: 112 MMHG | HEART RATE: 68 BPM | RESPIRATION RATE: 16 BRPM | BODY MASS INDEX: 21.69 KG/M2

## 2017-05-01 DIAGNOSIS — R42 DIZZINESS: Primary | ICD-10-CM

## 2017-05-01 DIAGNOSIS — H69.83 ETD (EUSTACHIAN TUBE DYSFUNCTION), BILATERAL: ICD-10-CM

## 2017-05-01 DIAGNOSIS — G44.52 NEW DAILY PERSISTENT HEADACHE: ICD-10-CM

## 2017-05-01 PROCEDURE — 99213 OFFICE O/P EST LOW 20 MIN: CPT | Performed by: FAMILY MEDICINE

## 2017-05-01 RX ORDER — MECLIZINE HYDROCHLORIDE 25 MG/1
25 TABLET ORAL 3 TIMES DAILY PRN
Qty: 30 TABLET | Refills: 0 | Status: SHIPPED | OUTPATIENT
Start: 2017-05-01 | End: 2017-10-12

## 2017-05-01 RX ORDER — FLUTICASONE PROPIONATE 50 MCG
1 SPRAY, SUSPENSION (ML) NASAL DAILY
Qty: 1 EACH | Refills: 0 | Status: SHIPPED | OUTPATIENT
Start: 2017-05-01 | End: 2017-05-31

## 2017-06-05 ENCOUNTER — OFFICE VISIT (OUTPATIENT)
Dept: FAMILY MEDICINE CLINIC | Facility: CLINIC | Age: 47
End: 2017-06-05

## 2017-06-05 VITALS
TEMPERATURE: 98.6 F | BODY MASS INDEX: 21.83 KG/M2 | DIASTOLIC BLOOD PRESSURE: 68 MMHG | HEIGHT: 65 IN | SYSTOLIC BLOOD PRESSURE: 102 MMHG | HEART RATE: 68 BPM | WEIGHT: 131 LBS | RESPIRATION RATE: 18 BRPM

## 2017-06-05 DIAGNOSIS — E03.9 HYPOTHYROIDISM, UNSPECIFIED TYPE: ICD-10-CM

## 2017-06-05 DIAGNOSIS — J06.9 ACUTE URI: Primary | ICD-10-CM

## 2017-06-05 LAB — TSH SERPL DL<=0.005 MIU/L-ACNC: 0.04 MIU/ML (ref 0.35–5.35)

## 2017-06-05 PROCEDURE — 99213 OFFICE O/P EST LOW 20 MIN: CPT | Performed by: PHYSICIAN ASSISTANT

## 2017-06-05 RX ORDER — FLUTICASONE PROPIONATE 50 MCG
2 SPRAY, SUSPENSION (ML) NASAL DAILY
Qty: 1 EACH | Refills: 0 | Status: SHIPPED | OUTPATIENT
Start: 2017-06-05 | End: 2017-07-05

## 2017-06-05 RX ORDER — AMOXICILLIN AND CLAVULANATE POTASSIUM 875; 125 MG/1; MG/1
1 TABLET, FILM COATED ORAL 2 TIMES DAILY
Qty: 14 TABLET | Refills: 0 | Status: SHIPPED | OUTPATIENT
Start: 2017-06-05 | End: 2017-08-02

## 2017-06-05 NOTE — PROGRESS NOTES
Farhan Mcginnis is a 46 y.o. female.     URI    This is a new problem. The current episode started in the past 7 days. The problem has been unchanged. There has been no fever. Associated symptoms include congestion, coughing, headaches, rhinorrhea and sinus pain. Pertinent negatives include no abdominal pain, chest pain, diarrhea, dysuria, ear pain, joint pain, joint swelling, nausea, neck pain, plugged ear sensation, rash, sneezing, sore throat, swollen glands, vomiting or wheezing. She has tried acetaminophen for the symptoms. The treatment provided mild relief.      Pt wishes to have TSH checked. Sees endocrinology but not again until August. Thinks her levels may be off.   The following portions of the patient's history were reviewed and updated as appropriate: allergies, current medications, past family history, past medical history, past social history, past surgical history and problem list.    Review of Systems   Constitutional: Positive for fatigue. Negative for chills, diaphoresis and fever.   HENT: Positive for congestion, rhinorrhea and sinus pressure. Negative for ear discharge, ear pain, hearing loss, nosebleeds, postnasal drip, sneezing and sore throat.    Eyes: Negative.    Respiratory: Positive for cough. Negative for chest tightness, shortness of breath and wheezing.    Cardiovascular: Negative.  Negative for chest pain, palpitations and leg swelling.   Gastrointestinal: Negative for abdominal distention, abdominal pain, anal bleeding, blood in stool, constipation, diarrhea, nausea, rectal pain and vomiting.   Endocrine: Negative.  Negative for cold intolerance, heat intolerance, polydipsia, polyphagia and polyuria.   Genitourinary: Negative.  Negative for difficulty urinating, dysuria, flank pain, frequency, hematuria and urgency.   Musculoskeletal: Negative.  Negative for arthralgias, back pain, gait problem, joint pain, joint swelling, myalgias, neck pain and neck stiffness.   Skin:  "Negative.  Negative for color change, pallor, rash and wound.   Neurological: Positive for headaches. Negative for dizziness, syncope, weakness, light-headedness and numbness.   Hematological: Positive for adenopathy.   Psychiatric/Behavioral: Negative.  Negative for behavioral problems, confusion, self-injury, sleep disturbance and suicidal ideas. The patient is not nervous/anxious.        Objective    Blood pressure 102/68, pulse 68, temperature 98.6 °F (37 °C), resp. rate 18, height 65\" (165.1 cm), weight 131 lb (59.4 kg).     Physical Exam   Constitutional: She is oriented to person, place, and time. She appears well-developed and well-nourished.   HENT:   Head: Normocephalic and atraumatic.   Right Ear: External ear and ear canal normal. Tympanic membrane is retracted. Tympanic membrane is not perforated, not erythematous and not bulging.   Left Ear: External ear and ear canal normal. Tympanic membrane is retracted. Tympanic membrane is not perforated, not erythematous and not bulging.   Nose: Mucosal edema present. No rhinorrhea. Right sinus exhibits no maxillary sinus tenderness and no frontal sinus tenderness. Left sinus exhibits no maxillary sinus tenderness and no frontal sinus tenderness.   Mouth/Throat: Uvula is midline. Posterior oropharyngeal erythema present. No oropharyngeal exudate or posterior oropharyngeal edema.   Eyes: Conjunctivae and EOM are normal. Pupils are equal, round, and reactive to light.   Neck: Normal range of motion. Neck supple. No tracheal deviation present. No thyromegaly present.   Cardiovascular: Normal rate, regular rhythm, normal heart sounds and intact distal pulses.  Exam reveals no gallop and no friction rub.    No murmur heard.  Pulmonary/Chest: Effort normal and breath sounds normal. No respiratory distress. She has no wheezes. She has no rales. She exhibits no tenderness.   Abdominal: Soft. Bowel sounds are normal. She exhibits no distension and no mass. There is no " tenderness. There is no rebound and no guarding. No hernia.   Musculoskeletal: She exhibits no tenderness.   Lymphadenopathy:     She has no cervical adenopathy.   Neurological: She is alert and oriented to person, place, and time.   Skin: Skin is warm and dry.   Psychiatric: She has a normal mood and affect. Her behavior is normal. Judgment and thought content normal.   Nursing note and vitals reviewed.      Assessment/Plan   Ivis was seen today for uri.    Diagnoses and all orders for this visit:    Acute URI  -     amoxicillin-clavulanate (AUGMENTIN) 875-125 MG per tablet; Take 1 tablet by mouth 2 (Two) Times a Day.  -     fluticasone (FLONASE) 50 MCG/ACT nasal spray; 2 sprays into each nostril Daily for 30 days.    Hypothyroidism, unspecified type  -     TSH      Treatment as outlined in plan. F/U if symptoms do not improve or if new or worsening symptoms develop. Pt agrees  Will check TSH

## 2017-06-26 RX ORDER — VALACYCLOVIR HYDROCHLORIDE 1 G/1
TABLET, FILM COATED ORAL
Qty: 90 TABLET | Refills: 0 | Status: SHIPPED | OUTPATIENT
Start: 2017-06-26 | End: 2017-09-25 | Stop reason: SDUPTHER

## 2017-06-28 ENCOUNTER — HOSPITAL ENCOUNTER (OUTPATIENT)
Dept: BONE DENSITY | Facility: HOSPITAL | Age: 47
Discharge: HOME | End: 2017-06-28
Attending: OBSTETRICS & GYNECOLOGY | Admitting: OBSTETRICS & GYNECOLOGY

## 2017-06-28 DIAGNOSIS — Z01.419 ENCOUNTER FOR GYNECOLOGICAL EXAMINATION WITHOUT ABNORMAL FINDING: ICD-10-CM

## 2017-06-28 DIAGNOSIS — Z79.890 POST-MENOPAUSE ON HRT (HORMONE REPLACEMENT THERAPY): ICD-10-CM

## 2017-06-28 PROCEDURE — 77080 DXA BONE DENSITY AXIAL: CPT

## 2017-06-28 PROCEDURE — 77080 DXA BONE DENSITY AXIAL: CPT | Performed by: RADIOLOGY

## 2017-07-17 RX ORDER — LORATADINE, PSEUDOEPHEDRINE SULFATE 5; 120 MG/1; MG/1
TABLET, FILM COATED, EXTENDED RELEASE ORAL
Qty: 180 TABLET | Refills: 0 | Status: SHIPPED | OUTPATIENT
Start: 2017-07-17 | End: 2017-10-12

## 2017-08-02 ENCOUNTER — OFFICE VISIT (OUTPATIENT)
Dept: FAMILY MEDICINE CLINIC | Facility: CLINIC | Age: 47
End: 2017-08-02

## 2017-08-02 VITALS
HEIGHT: 65 IN | DIASTOLIC BLOOD PRESSURE: 70 MMHG | BODY MASS INDEX: 21.86 KG/M2 | TEMPERATURE: 97.7 F | HEART RATE: 68 BPM | WEIGHT: 131.2 LBS | SYSTOLIC BLOOD PRESSURE: 110 MMHG | RESPIRATION RATE: 16 BRPM

## 2017-08-02 DIAGNOSIS — H60.501 ACUTE OTITIS EXTERNA OF RIGHT EAR, UNSPECIFIED TYPE: Primary | ICD-10-CM

## 2017-08-02 PROCEDURE — 99213 OFFICE O/P EST LOW 20 MIN: CPT | Performed by: NURSE PRACTITIONER

## 2017-08-02 RX ORDER — OFLOXACIN 3 MG/ML
5 SOLUTION AURICULAR (OTIC) DAILY
Qty: 5 ML | Refills: 0 | Status: SHIPPED | OUTPATIENT
Start: 2017-08-02 | End: 2017-10-12

## 2017-08-02 NOTE — PROGRESS NOTES
Farhan Mcginnis is a 47 y.o. female.     History of Present Illness   Right ear pain and jaw pain, right neck pain that started over weekend  Taking Claritin D with no improvement  Having some chills but no fever or ST or cough or swelling of lymph nodes  No known exposure to illness  Soreness in right side of face and right ear and down into right side of neck   No recent swimming in lake or hot tub or pool. No drainage or discharge from ears    The following portions of the patient's history were reviewed and updated as appropriate: allergies, current medications, past family history, past medical history, past social history, past surgical history and problem list.    Review of Systems   Constitutional: Positive for chills. Negative for activity change, fatigue and fever.   HENT: Positive for ear pain. Negative for congestion, ear discharge, sinus pressure, sneezing, sore throat, tinnitus, trouble swallowing and voice change.    Eyes: Negative.    Respiratory: Negative.    Cardiovascular: Negative.    Endocrine: Negative.    Genitourinary: Negative.    Musculoskeletal: Positive for arthralgias (right jaw pain infront of right ear) and neck pain. Negative for neck stiffness.   Skin: Negative.    Allergic/Immunologic: Negative.    Neurological: Negative.    Hematological: Negative.    Psychiatric/Behavioral: Negative.        Objective   Physical Exam   Constitutional: She is oriented to person, place, and time. She appears well-developed and well-nourished. No distress.   HENT:   Head: Normocephalic.   Right Ear: Hearing, tympanic membrane and external ear normal. There is tenderness (redness in right ear canal, no discharge, swelling or drainage). No mastoid tenderness. Tympanic membrane is not erythematous, not retracted and not bulging. Tympanic membrane mobility is normal. No middle ear effusion.   Left Ear: Hearing, tympanic membrane, external ear and ear canal normal. No drainage, swelling or  tenderness.   Nose: Nose normal. No mucosal edema or rhinorrhea.   Mouth/Throat: Uvula is midline and oropharynx is clear and moist. No oropharyngeal exudate, posterior oropharyngeal edema or posterior oropharyngeal erythema.   Neck: Normal range of motion. Neck supple. No thyromegaly present.   Cardiovascular: Normal rate, regular rhythm and normal heart sounds.    Pulmonary/Chest: Effort normal and breath sounds normal.   Abdominal: Soft.   Lymphadenopathy:     She has no cervical adenopathy.   Neurological: She is alert and oriented to person, place, and time.   Skin: Skin is warm and dry. No rash noted. No erythema.   Psychiatric: She has a normal mood and affect. Her behavior is normal. Judgment and thought content normal.   Nursing note and vitals reviewed.      Assessment/Plan   Ivis was seen today for sore throat, pnd & sinus congestion.    Diagnoses and all orders for this visit:    Acute otitis externa of right ear, unspecified type    Other orders  -     ofloxacin (FLOXIN) 0.3 % otic solution; Administer 5 drops to the right ear Daily.    Use drops to ear as directed for 7 days  Take OTC Ibuprofen for pain, use warm moist compresses to jaw. F/U if not improving.

## 2017-08-04 ENCOUNTER — TELEPHONE (OUTPATIENT)
Dept: FAMILY MEDICINE CLINIC | Facility: CLINIC | Age: 47
End: 2017-08-04

## 2017-08-04 RX ORDER — AZITHROMYCIN 250 MG/1
TABLET, FILM COATED ORAL
Qty: 6 TABLET | Refills: 0 | Status: SHIPPED | OUTPATIENT
Start: 2017-08-04 | End: 2017-10-12

## 2017-08-04 NOTE — TELEPHONE ENCOUNTER
----- Message from Marilin Hartmann sent at 8/4/2017 11:02 AM EDT -----  Contact: PERLITA  PATIENT IS NOT FEELING ANY BETTER,  EAR IS BETTER, BUT ALL THE COLD SYMPTOMS ARE CONTINUING TO GET WORSE.       [facility]

## 2017-08-04 NOTE — TELEPHONE ENCOUNTER
Zpak sent to [facility] pharmacy ([facility] not receiving prescriptions currently) Please inform pt. ajc

## 2017-08-28 RX ORDER — TOPIRAMATE 50 MG/1
TABLET, FILM COATED ORAL
Qty: 180 TABLET | Refills: 0 | Status: SHIPPED | OUTPATIENT
Start: 2017-08-28 | End: 2018-01-23 | Stop reason: SDUPTHER

## 2017-09-25 RX ORDER — VALACYCLOVIR HYDROCHLORIDE 1 G/1
TABLET, FILM COATED ORAL
Qty: 90 TABLET | Refills: 0 | Status: SHIPPED | OUTPATIENT
Start: 2017-09-25 | End: 2017-12-26 | Stop reason: SDUPTHER

## 2017-10-05 DIAGNOSIS — E03.9 HYPOTHYROIDISM, UNSPECIFIED TYPE: Primary | ICD-10-CM

## 2017-10-05 LAB — TSH SERPL DL<=0.005 MIU/L-ACNC: 0.53 MIU/ML (ref 0.35–5.35)

## 2017-10-12 ENCOUNTER — OFFICE VISIT (OUTPATIENT)
Dept: FAMILY MEDICINE CLINIC | Facility: CLINIC | Age: 47
End: 2017-10-12

## 2017-10-12 VITALS
RESPIRATION RATE: 16 BRPM | HEART RATE: 64 BPM | WEIGHT: 133.5 LBS | SYSTOLIC BLOOD PRESSURE: 115 MMHG | TEMPERATURE: 97.1 F | BODY MASS INDEX: 22.24 KG/M2 | DIASTOLIC BLOOD PRESSURE: 70 MMHG | HEIGHT: 65 IN

## 2017-10-12 DIAGNOSIS — H60.331 ACUTE SWIMMER'S EAR OF RIGHT SIDE: ICD-10-CM

## 2017-10-12 DIAGNOSIS — J01.00 ACUTE NON-RECURRENT MAXILLARY SINUSITIS: Primary | ICD-10-CM

## 2017-10-12 PROCEDURE — 99214 OFFICE O/P EST MOD 30 MIN: CPT | Performed by: NURSE PRACTITIONER

## 2017-10-12 RX ORDER — AMOXICILLIN AND CLAVULANATE POTASSIUM 875; 125 MG/1; MG/1
1 TABLET, FILM COATED ORAL 2 TIMES DAILY
Qty: 14 TABLET | Refills: 0 | Status: SHIPPED | OUTPATIENT
Start: 2017-10-12 | End: 2017-11-08

## 2017-10-12 RX ORDER — LEVOTHYROXINE SODIUM 75 MCG
TABLET ORAL
COMMUNITY
Start: 2017-08-09 | End: 2020-01-20

## 2017-10-12 RX ORDER — OFLOXACIN 3 MG/ML
5 SOLUTION AURICULAR (OTIC) DAILY
Qty: 5 ML | Refills: 0 | Status: SHIPPED | OUTPATIENT
Start: 2017-10-12 | End: 2017-11-08

## 2017-10-12 NOTE — PROGRESS NOTES
Farhan Mcginnis is a 47 y.o. female.     History of Present Illness   Cough and sinus congestion and drainage since last week  Right ear pain like when she had swimmers ear  Taking OTC meds with no improvement  Low grade fever and feeling poorly  Coughing up sputum, not sleeping well, nasal congestion  No ST or wheezing    The following portions of the patient's history were reviewed and updated as appropriate: allergies, current medications, past family history, past medical history, past social history, past surgical history and problem list.    Review of Systems   Constitutional: Positive for fatigue and fever. Negative for chills.   HENT: Positive for congestion, ear pain, postnasal drip, rhinorrhea and sinus pain. Negative for sore throat.    Eyes: Negative.    Respiratory: Positive for cough. Negative for wheezing.    Cardiovascular: Negative.    Gastrointestinal: Negative.    Endocrine: Negative.    Genitourinary: Negative.    Musculoskeletal: Negative.    Skin: Negative.    Allergic/Immunologic: Negative.    Neurological: Negative.    Hematological: Negative.    Psychiatric/Behavioral: Positive for sleep disturbance.       Objective   Physical Exam   Constitutional: She is oriented to person, place, and time. Vital signs are normal. She appears well-developed and well-nourished.   HENT:   Head: Normocephalic.   Right Ear: External ear normal. There is swelling (right ear canal with redness and swelling, tenderness) and tenderness. No drainage. No mastoid tenderness. Tympanic membrane is not erythematous, not retracted and not bulging.   Left Ear: Tympanic membrane, external ear and ear canal normal. No mastoid tenderness. Tympanic membrane is not erythematous, not retracted and not bulging.   Nose: Nose normal. Right sinus exhibits no maxillary sinus tenderness. Left sinus exhibits no maxillary sinus tenderness.   Mouth/Throat: Uvula is midline, oropharynx is clear and moist and mucous membranes  are normal.   Eyes: Conjunctivae are normal. Pupils are equal, round, and reactive to light.   Neck: Neck supple.   Cardiovascular: Normal rate, regular rhythm and normal heart sounds.    Pulmonary/Chest: Effort normal and breath sounds normal.   Musculoskeletal: Normal range of motion.   Lymphadenopathy:        Head (right side): No tonsillar, no preauricular, no posterior auricular and no occipital adenopathy present.        Head (left side): No tonsillar, no preauricular, no posterior auricular and no occipital adenopathy present.     She has no cervical adenopathy.   Neurological: She is alert and oriented to person, place, and time.   Skin: Skin is warm and dry.   Psychiatric: She has a normal mood and affect. Her behavior is normal. Judgment and thought content normal.   Nursing note and vitals reviewed.      Assessment/Plan   Ivis was seen today for cough, sinus congestion & drainage.    Diagnoses and all orders for this visit:    Acute non-recurrent maxillary sinusitis    Acute swimmer's ear of right side    Other orders  -     ofloxacin (FLOXIN) 0.3 % otic solution; Administer 5 drops to the right ear Daily.  -     HYDROcod Polst-CPM Polst ER (TUSSIONEX PENNKINETIC) 10-8 MG/5ML ER suspension; Take 5 mL by mouth Every 12 (Twelve) Hours As Needed for Cough.  -     amoxicillin-clavulanate (AUGMENTIN) 875-125 MG per tablet; Take 1 tablet by mouth 2 (Two) Times a Day.      Take meds as directed  Follow up if not improving  Recommend OTC Ibuprofen for pain as needed  Do not take cough syrup due to side effects of medication causing sleepiness. Pt agrees.

## 2017-10-12 NOTE — PATIENT INSTRUCTIONS
Ear Drops, Adult  You have been diagnosed with a condition requiring you to put drops of medicine into your outer ear.  HOME CARE INSTRUCTIONS   · Put drops in the affected ear as instructed. After putting the drops in, you will need to lie down with the affected ear facing up for ten minutes so the drops will remain in the ear canal and run down and fill the canal. Continue using the ear drops for as long as directed by your health care provider.  · Prior to getting up, put a cotton ball gently in your ear canal. Leave enough of the cotton ball out so it can be easily removed. Do not attempt to push this down into the canal with a cotton-tipped swab or other instrument.  · Do not irrigate or wash out your ears if you have had a perforated eardrum or mastoid surgery, or unless instructed to do so by your health care provider.  · Keep appointments with your health care provider as instructed.  · Finish all medicine, or use for the length of time prescribed by your health care provider. Continue the drops even if your problem seems to be doing well after a couple days, or continue as instructed.  SEEK MEDICAL CARE IF:  · You become worse or develop increasing pain.  · You notice any unusual drainage from your ear (particularly if the drainage has a bad smell).  · You develop hearing difficulties.  · You experience a serious form of dizziness in which you feel as if the room is spinning, and you feel nauseated (vertigo).  · The outside of your ear becomes red or swollen or both. This may be a sign of an allergic reaction.  MAKE SURE YOU:   · Understand these instructions.  · Will watch your condition.  · Will get help right away if you are not doing well or get worse.     This information is not intended to replace advice given to you by your health care provider. Make sure you discuss any questions you have with your health care provider.     Document Released: 12/12/2002 Document Revised: 01/08/2016 Document  "Reviewed: 07/15/2014  [employer] Interactive Patient Education ©2017 [employer]    Otitis Externa  Otitis externa is a bacterial or fungal infection of the outer ear canal. This is the area from the eardrum to the outside of the ear. Otitis externa is sometimes called \"swimmer's ear.\"  CAUSES   Possible causes of infection include:  · Swimming in dirty water.  · Moisture remaining in the ear after swimming or bathing.  · Mild injury (trauma) to the ear.  · Objects stuck in the ear (foreign body).  · Cuts or scrapes (abrasions) on the outside of the ear.  SIGNS AND SYMPTOMS   The first symptom of infection is often itching in the ear canal. Later signs and symptoms may include swelling and redness of the ear canal, ear pain, and yellowish-white fluid (pus) coming from the ear. The ear pain may be worse when pulling on the earlobe.  DIAGNOSIS   Your health care provider will perform a physical exam. A sample of fluid may be taken from the ear and examined for bacteria or fungi.  TREATMENT   Antibiotic ear drops are often given for 10 to 14 days. Treatment may also include pain medicine or corticosteroids to reduce itching and swelling.  HOME CARE INSTRUCTIONS   · Apply antibiotic ear drops to the ear canal as prescribed by your health care provider.  · Take medicines only as directed by your health care provider.  · If you have diabetes, follow any additional treatment instructions from your health care provider.  · Keep all follow-up visits as directed by your health care provider.  PREVENTION   · Keep your ear dry. Use the corner of a towel to absorb water out of the ear canal after swimming or bathing.  · Avoid scratching or putting objects inside your ear. This can damage the ear canal or remove the protective wax that lines the canal. This makes it easier for bacteria and fungi to grow.  · Avoid swimming in lakes, polluted water, or poorly chlorinated pools.  · You may use ear drops made of rubbing alcohol and " vinegar after swimming. Combine equal parts of white vinegar and alcohol in a bottle. Put 3 or 4 drops into each ear after swimming.  SEEK MEDICAL CARE IF:   · You have a fever.  · Your ear is still red, swollen, painful, or draining pus after 3 days.  · Your redness, swelling, or pain gets worse.  · You have a severe headache.  · You have redness, swelling, pain, or tenderness in the area behind your ear.  MAKE SURE YOU:   · Understand these instructions.  · Will watch your condition.  · Will get help right away if you are not doing well or get worse.     This information is not intended to replace advice given to you by your health care provider. Make sure you discuss any questions you have with your health care provider.     Document Released: 12/18/2006 Document Revised: 01/08/2016 Document Reviewed: 09/26/2016  [employer] Interactive Patient Education ©2017 [employer]

## 2017-11-08 ENCOUNTER — OFFICE VISIT (OUTPATIENT)
Dept: FAMILY MEDICINE CLINIC | Facility: CLINIC | Age: 47
End: 2017-11-08

## 2017-11-08 VITALS
TEMPERATURE: 96.9 F | HEART RATE: 72 BPM | HEIGHT: 65 IN | SYSTOLIC BLOOD PRESSURE: 110 MMHG | WEIGHT: 134 LBS | RESPIRATION RATE: 20 BRPM | BODY MASS INDEX: 22.33 KG/M2 | DIASTOLIC BLOOD PRESSURE: 70 MMHG

## 2017-11-08 DIAGNOSIS — J06.9 ACUTE URI: Primary | ICD-10-CM

## 2017-11-08 PROCEDURE — 99213 OFFICE O/P EST LOW 20 MIN: CPT | Performed by: FAMILY MEDICINE

## 2017-11-08 RX ORDER — AZITHROMYCIN 250 MG/1
TABLET, FILM COATED ORAL
Qty: 6 TABLET | Refills: 0 | Status: SHIPPED | OUTPATIENT
Start: 2017-11-08 | End: 2018-01-15

## 2017-11-08 NOTE — PROGRESS NOTES
Subjective   Ivis Mcginnis is a 47 y.o. female.     History of Present Illness     For the last 3-4 days has had cough and congestion  She had flu shot last week and then felt worse after this  Mucous continues to be discolored  Sinus pressure and HA  Really congested      Review of Systems   HENT: Positive for congestion and sinus pressure.        Objective   Physical Exam   Constitutional: She appears well-developed and well-nourished.   HENT:   Head: Normocephalic and atraumatic.   Right Ear: Hearing, tympanic membrane, external ear and ear canal normal.   Left Ear: Hearing, tympanic membrane, external ear and ear canal normal.   Nose: Nose normal.   Mouth/Throat: Uvula is midline, oropharynx is clear and moist and mucous membranes are normal.   Eyes: Conjunctivae and EOM are normal.   Neck: Normal range of motion.   Cardiovascular: Normal rate, regular rhythm and normal heart sounds.    Pulmonary/Chest: Effort normal and breath sounds normal.   Lymphadenopathy:     She has no cervical adenopathy.   Psychiatric: She has a normal mood and affect. Her behavior is normal.   Nursing note and vitals reviewed.      Assessment/Plan   Ivis was seen today for cough and nasal congestion.    Diagnoses and all orders for this visit:    Acute URI  -     Chlorcyclizine-Pseudoephed 25-60 MG tablet; 1/2-1 po q 8 hours PRN  -     azithromycin (ZITHROMAX Z-ROGERIO) 250 MG tablet; Take 2 tablets the first day, then 1 tablet daily for 4 days.    she is prone to sinus infections. Will treat with stahist and consider antibiotics INB in the next 48 hours.  Pt agrees.

## 2017-12-26 RX ORDER — VALACYCLOVIR HYDROCHLORIDE 1 G/1
TABLET, FILM COATED ORAL
Qty: 90 TABLET | Refills: 0 | Status: SHIPPED | OUTPATIENT
Start: 2017-12-26 | End: 2018-01-23 | Stop reason: SDUPTHER

## 2018-01-15 ENCOUNTER — OFFICE VISIT (OUTPATIENT)
Dept: OBSTETRICS AND GYNECOLOGY | Facility: CLINIC | Age: 48
End: 2018-01-15

## 2018-01-15 VITALS
BODY MASS INDEX: 23.49 KG/M2 | SYSTOLIC BLOOD PRESSURE: 108 MMHG | WEIGHT: 141 LBS | DIASTOLIC BLOOD PRESSURE: 64 MMHG | HEIGHT: 65 IN

## 2018-01-15 DIAGNOSIS — Z79.890 POST-MENOPAUSE ON HRT (HORMONE REPLACEMENT THERAPY): Primary | ICD-10-CM

## 2018-01-15 DIAGNOSIS — N60.12 FIBROCYSTIC BREAST CHANGES, BILATERAL: ICD-10-CM

## 2018-01-15 DIAGNOSIS — N60.11 FIBROCYSTIC BREAST CHANGES, BILATERAL: ICD-10-CM

## 2018-01-15 PROCEDURE — 99396 PREV VISIT EST AGE 40-64: CPT | Performed by: OBSTETRICS & GYNECOLOGY

## 2018-01-15 RX ORDER — CLOBETASOL PROPIONATE 0.46 MG/ML
1 SOLUTION TOPICAL DAILY
COMMUNITY
Start: 2017-11-21 | End: 2019-01-21

## 2018-01-15 RX ORDER — UREA 40 %
1 CREAM (GRAM) TOPICAL DAILY
COMMUNITY
Start: 2017-12-26 | End: 2019-01-21

## 2018-01-15 RX ORDER — INFLUENZA A VIRUS A/MICHIGAN/45/2015 X-275 (H1N1) ANTIGEN (FORMALDEHYDE INACTIVATED), INFLUENZA A VIRUS A/SINGAPORE/INFIMH-16-0019/2016 IVR-186 (H3N2) ANTIGEN (FORMALDEHYDE INACTIVATED), INFLUENZA B VIRUS B/PHUKET/3073/2013 ANTIGEN (FORMALDEHYDE INACTIVATED), AND INFLUENZA B VIRUS B/MARYLAND/15/2016 BX-69A ANTIGEN (FORMALDEHYDE INACTIVATED) 15; 15; 15; 15 UG/.5ML; UG/.5ML; UG/.5ML; UG/.5ML
INJECTION, SUSPENSION INTRAMUSCULAR
COMMUNITY
Start: 2017-11-04 | End: 2019-01-21

## 2018-01-15 NOTE — PROGRESS NOTES
Chief Complaint   Patient presents with   • Gynecologic Exam   • Med Refnia Mcginnis is a 47 y.o. year old  presenting to be seen for her annual exam.This patient has previously had an LAVH/VCS.  She has a history of acute myeloid leukemia.  She has experienced some graft-donor reaction she currently takes Premarin, 0.9 mg daily and is asymptomatic.  She has a history of mild osteopenia of the right hip and right femoral neck.  She has stable fibrocystic changes of the breast.    SCREENING TESTS    Year 2012   Age                         PAP      Neg.                   HPV high risk                         Mammogram     benign                    NILO score                         Breast MRI                         Lipids                         Vitamin D                         Colonoscopy                         DEXA  Frax (hip/any)      osteopenia                   Ovarian Screen                             She exercises regularly: yes.  She wears her seat belt: yes.  She has concerns about domestic violence: no.  She has noticed changes in height: no    GYN screening history:  · Last pap: was done on 2017 and was negative  · Last DEXA: was done on approximately 2017 and the results were: osteopenia of hips and osteopenia of the femoral neck.    No Additional Complaints Reported    The following portions of the patient's history were reviewed and updated as appropriate:vital signs and   She  does not have any pertinent problems on file.  She  has a past surgical history that includes Bone marrow transplant (2009); laparoscopic assisted vaginal hysterectomy; Portacath placement; Laser laparoscopy; and d&c hysteroscopy.  Her family history includes Hypertension in her father; Kidney disease in her mother.  She  reports that she has never smoked. She has never used smokeless  "tobacco. She reports that she does not drink alcohol or use illicit drugs.  Current Outpatient Prescriptions   Medication Sig Dispense Refill   • Potassium Gluconate 595 MG capsule Take 1 capsule by mouth Daily.     • aspirin 81 MG chewable tablet Chew 81 mg Every 3 (Three) Days.     • Cholecalciferol (VITAMIN D) 2000 UNITS capsule Take  by mouth.     • clobetasol (TEMOVATE) 0.05 % external solution Apply 1 application topically Daily.     • estrogens, conjugated, (PREMARIN) 0.9 MG tablet Take 1 tablet by mouth Daily. 90 tablet 3   • FLUZONE QUADRIVALENT 0.5 ML suspension prefilled syringe injection      • SYNTHROID 75 MCG tablet      • topiramate (TOPAMAX) 50 MG tablet TAKE ONE TABLET BY MOUTH TWICE DAILY 180 tablet 0   • urea (CARMOL) 40 % cream Apply 1 application topically Daily.     • valACYclovir (VALTREX) 1000 MG tablet TAKE ONE TABLET BY MOUTH ONCE DAILY 90 tablet 0     No current facility-administered medications for this visit.      She is allergic to pentamidine and piperacillin sod-tazobactam so..    Review of Systems  A comprehensive review of systems was taken.  Constitutional: negative for fever, chills, activity change, appetite change, fatigue and unexpected weight change.  Respiratory: negative  Cardiovascular: negative  Gastrointestinal: negative  Genitourinary:negative  Musculoskeletal:negative  Behavioral/Psych: negative       /64  Ht 165.1 cm (65\")  Wt 64 kg (141 lb)  BMI 23.46 kg/m2    Physical Exam    General:  alert; cooperative; well developed; well nourished   Skin:  No suspicious lesions seen   Thyroid: normal to inspection and palpation   Lungs:  clear to auscultation bilaterally   Heart:  regular rate and rhythm, S1, S2 normal, no murmur, click, rub or gallop   Breasts:  Examined in supine position  Symmetric without masses or skin dimpling  Nipples normal without inversion, lesions or discharge  There are no palpable axillary nodes  Fibrocystic changes are present both breasts " without a discrete mass   Abdomen: soft, non-tender; no masses  no umbilical or inginual hernias are present  no hepato-splenomegaly   Pelvis: Clinical staff was present for exam  External genitalia:  normal appearance of the external genitalia including Bartholin's and [place]'s glands.  Vaginal:  normal pink mucosa without prolapse or lesions.  Cervix:  absent.  Uterus:  absent.  Adnexa:  non palpable bilaterally.  Rectal:  anus visually normal appearing. recto-vaginal exam unremarkable and confirms findings;     Lab Review   No data reviewed    Imaging  DEXA- mild osteopenia of the right femoral neck and total hip, spine is normal         ASSESSMENT  Problems Addressed this Visit        Genitourinary    Post-menopause on HRT (hormone replacement therapy) - Primary       Other    Fibrocystic breast changes, bilateral          PLAN    Medications prescribed this encounter:    New Medications Ordered This Visit   Medications   • FLUZONE QUADRIVALENT 0.5 ML suspension prefilled syringe injection   • clobetasol (TEMOVATE) 0.05 % external solution     Sig: Apply 1 application topically Daily.   • urea (CARMOL) 40 % cream     Sig: Apply 1 application topically Daily.   • Potassium Gluconate 595 MG capsule     Sig: Take 1 capsule by mouth Daily.   · Monthly self breast assessment and annual breast imaging  · The Premarin dose was decreased to 0.625 mg daily  · Calcium, 600 mg/ Vit. D, 400 IU daily; regular weight-bearing exercise  · Follow up: 12 month(s)  *Please note that portions of this documentation may have been completed with a voice recognition program.  Efforts were made to edit this dictation, but occasional words may have been mistranscribed.       This note was electronically signed.    DOROTHEA Avila MD  January 15, 2018  2:29 PM

## 2018-01-23 RX ORDER — VALACYCLOVIR HYDROCHLORIDE 1 G/1
TABLET, FILM COATED ORAL
Qty: 90 TABLET | Refills: 0 | Status: SHIPPED | OUTPATIENT
Start: 2018-01-23 | End: 2018-05-14 | Stop reason: SDUPTHER

## 2018-01-23 RX ORDER — TOPIRAMATE 50 MG/1
TABLET, FILM COATED ORAL
Qty: 180 TABLET | Refills: 0 | Status: SHIPPED | OUTPATIENT
Start: 2018-01-23 | End: 2018-05-14 | Stop reason: SDUPTHER

## 2018-02-19 ENCOUNTER — OFFICE VISIT (OUTPATIENT)
Dept: FAMILY MEDICINE CLINIC | Facility: CLINIC | Age: 48
End: 2018-02-19

## 2018-02-19 VITALS
TEMPERATURE: 98.8 F | HEIGHT: 65 IN | SYSTOLIC BLOOD PRESSURE: 110 MMHG | DIASTOLIC BLOOD PRESSURE: 72 MMHG | BODY MASS INDEX: 22.02 KG/M2 | WEIGHT: 132.2 LBS | HEART RATE: 77 BPM | OXYGEN SATURATION: 99 %

## 2018-02-19 DIAGNOSIS — M79.10 MUSCLE ACHE: ICD-10-CM

## 2018-02-19 DIAGNOSIS — R50.9 FEVER AND CHILLS: ICD-10-CM

## 2018-02-19 DIAGNOSIS — J10.1 INFLUENZA B: Primary | ICD-10-CM

## 2018-02-19 LAB
EXPIRATION DATE: NORMAL
FLUAV AG NPH QL: NEGATIVE
FLUBV AG NPH QL: NORMAL
INTERNAL CONTROL: NORMAL
Lab: NORMAL

## 2018-02-19 PROCEDURE — 87804 INFLUENZA ASSAY W/OPTIC: CPT | Performed by: FAMILY MEDICINE

## 2018-02-19 PROCEDURE — 99213 OFFICE O/P EST LOW 20 MIN: CPT | Performed by: FAMILY MEDICINE

## 2018-02-19 RX ORDER — OSELTAMIVIR PHOSPHATE 75 MG/1
75 CAPSULE ORAL 2 TIMES DAILY
Qty: 10 CAPSULE | Refills: 0 | Status: SHIPPED | OUTPATIENT
Start: 2018-02-19 | End: 2018-09-19

## 2018-02-19 NOTE — PROGRESS NOTES
Subjective   Ivis Mcginnis is a 47 y.o. female.     History of Present Illness     She has been il hte last 2 days  Chills, body aches  Just feels bad    Review of Systems   HENT: Positive for congestion.    Musculoskeletal: Positive for myalgias.       Objective   Physical Exam   Constitutional: She appears well-developed and well-nourished.   HENT:   Head: Normocephalic and atraumatic.   Right Ear: Hearing, tympanic membrane, external ear and ear canal normal.   Left Ear: Hearing, tympanic membrane, external ear and ear canal normal.   Nose: Nose normal.   Mouth/Throat: Uvula is midline, oropharynx is clear and moist and mucous membranes are normal.   Eyes: Conjunctivae and EOM are normal.   Neck: Normal range of motion.   Cardiovascular: Normal rate, regular rhythm and normal heart sounds.    Pulmonary/Chest: Effort normal and breath sounds normal.   Lymphadenopathy:     She has no cervical adenopathy.   Psychiatric: She has a normal mood and affect. Her behavior is normal.   Nursing note and vitals reviewed.      Assessment/Plan   Ivis was seen today for muscle pain, fever, sinusitis and chills.    Diagnoses and all orders for this visit:    Influenza B  -     oseltamivir (TAMIFLU) 75 MG capsule; Take 1 capsule by mouth 2 (Two) Times a Day.    Muscle ache  -     POCT Influenza A/B    Fever and chills  -     POCT Influenza A/B    +flu B, NSAIDs, rest, fluids, tamiflu and will treat her family.  F/u as needed

## 2018-05-14 RX ORDER — TOPIRAMATE 50 MG/1
TABLET, FILM COATED ORAL
Qty: 180 TABLET | Refills: 0 | Status: SHIPPED | OUTPATIENT
Start: 2018-05-14 | End: 2018-09-19

## 2018-05-14 RX ORDER — VALACYCLOVIR HYDROCHLORIDE 1 G/1
TABLET, FILM COATED ORAL
Qty: 90 TABLET | Refills: 0 | Status: SHIPPED | OUTPATIENT
Start: 2018-05-14 | End: 2018-09-19

## 2018-05-21 RX ORDER — TOPIRAMATE 50 MG/1
TABLET, FILM COATED ORAL
Qty: 180 TABLET | Refills: 0 | Status: SHIPPED | OUTPATIENT
Start: 2018-05-21 | End: 2020-01-20

## 2018-06-25 RX ORDER — VALACYCLOVIR HYDROCHLORIDE 1 G/1
TABLET, FILM COATED ORAL
Qty: 90 TABLET | Refills: 0 | Status: SHIPPED | OUTPATIENT
Start: 2018-06-25 | End: 2018-12-23 | Stop reason: SDUPTHER

## 2018-07-12 ENCOUNTER — LAB (OUTPATIENT)
Dept: LAB | Facility: HOSPITAL | Age: 48
End: 2018-07-12

## 2018-07-12 ENCOUNTER — TRANSCRIBE ORDERS (OUTPATIENT)
Dept: LAB | Facility: HOSPITAL | Age: 48
End: 2018-07-12

## 2018-07-12 DIAGNOSIS — E06.1 SUBACUTE THYROIDITIS: ICD-10-CM

## 2018-07-12 DIAGNOSIS — E03.9 MYXEDEMA HEART DISEASE: Primary | ICD-10-CM

## 2018-07-12 DIAGNOSIS — I51.9 MYXEDEMA HEART DISEASE: Primary | ICD-10-CM

## 2018-07-12 DIAGNOSIS — E03.9 MYXEDEMA HEART DISEASE: ICD-10-CM

## 2018-07-12 DIAGNOSIS — L63.0 ALOPECIA TOTALIS: ICD-10-CM

## 2018-07-12 DIAGNOSIS — I51.9 MYXEDEMA HEART DISEASE: ICD-10-CM

## 2018-07-12 LAB — TSH SERPL DL<=0.05 MIU/L-ACNC: 0.08 MIU/ML (ref 0.35–5.35)

## 2018-07-12 PROCEDURE — 84443 ASSAY THYROID STIM HORMONE: CPT

## 2018-07-12 PROCEDURE — 36415 COLL VENOUS BLD VENIPUNCTURE: CPT

## 2018-08-22 NOTE — PROGRESS NOTES
"   Chief Complaint   Patient presents with   • Gynecologic Exam   • Med Refill       Ivis Mcginnis is a 46 y.o. year old  presenting to be seen for her annual exam.  This patient has previously had an LAVH.  She is monitored for a history of acute myelogenous leukemia at the [facility].  She has a history of osteopenia of the hips and fibrocystic changes of the breast.  She is currently taking 0.9 mg of Premarin daily and is asymptomatic.    SCREENING TESTS    Year 2012   Age                         PAP Neg.                        HPV high risk                         Mammogram     benign                    NILO score                         Breast MRI                         Lipids                         Vitamin D                         Colonoscopy                         DEXA  Frax (hip/any)    osteopenia hip                     Ovarian Screen                           Enter the month test was performed.  If month not known, enter \"X'  · Black numbers = normal results  · Red numbers = abnormal results  · Black X = patient reported normal  · Red X - patient reported abnormal      Referred by:    Profession:    Other info:         History   Sexual Activity   • Sexual activity: Yes   • Partners: Male   • Birth control/ protection: Surgical     Comment:     She would not like to be screened for STD's at today's exam.     She exercises regularly: yes.  She wears her seat belt: yes.  She has concerns about domestic violence: no.  She has noticed changes in height: no    GYN screening history:  · Last mammogram: was done on approximately 2016 and the result was: Birads II (Benign findings).  · Last DEXA: was done on approximately 6/15/2015 and the results were: osteopenia of hips.    No Additional Complaints Reported    The following portions of the patient's history were reviewed and " updated as appropriate:vital signs and   She  does not have any pertinent problems on file.  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  Her family history includes Hypertension in her father; Kidney disease in her mother.  She  reports that she has never smoked. She does not have any smokeless tobacco history on file. She reports that she does not drink alcohol or use illicit drugs.  Current Outpatient Prescriptions   Medication Sig Dispense Refill   • Cholecalciferol (VITAMIN D) 2000 UNITS capsule Take  by mouth.     • LORATADINE-D 24HR  MG per 24 hr tablet TAKE ONE TABLET BY MOUTH ONCE DAILY 90 tablet 0   • potassium phosphate, monobasic, (K-PHOS) 500 MG tablet Take 500 mg by mouth 2 (two) times a day.     • valACYclovir (VALTREX) 1000 MG tablet TAKE ONE TABLET BY MOUTH ONCE DAILY 90 tablet 0   • aspirin 81 MG chewable tablet Chew 81 mg Every 3 (Three) Days.     • diclofenac (VOLTAREN) 75 MG EC tablet Take 1 tablet by mouth 2 (Two) Times a Day. 60 tablet 1   • diphenhydrAMINE (BENADRYL) 25 mg capsule Take 25 mg by mouth every 6 (six) hours as needed for itching.     • estrogens, conjugated, (PREMARIN) 0.9 MG tablet Take 1 tablet by mouth Daily. 90 tablet 3   • FLUZONE QUADRIVALENT 0.5 ML suspension prefilled syringe injection      • gabapentin (NEURONTIN) 600 MG tablet Take  by mouth 2 (two) times a day.     • levothyroxine (SYNTHROID, LEVOTHROID) 112 MCG tablet Take 1 tablet by mouth Daily. 30 tablet 1   • topiramate (TOPAMAX) 50 MG tablet TAKE ONE TABLET BY MOUTH TWICE DAILY (Patient taking differently: TAKE ONE TABLET by oral route daily) 180 tablet 0     No current facility-administered medications for this visit.      She is allergic to pentamidine and piperacillin sod-tazobactam so..    Review of Systems  A comprehensive review of systems was  "negative.  Constitutional: negative for fever, chills, activity change, appetite change, fatigue and unexpected weight change.  Respiratory: negative  Cardiovascular: negative  Gastrointestinal: negative  Genitourinary:negative  Musculoskeletal:positive for right chest wall pain  Behavioral/Psych: negative       Visit Vitals   • /70   • Ht 65\" (165.1 cm)   • Wt 128 lb 9.6 oz (58.3 kg)   • BMI 21.4 kg/m2       Physical Exam    General:  alert; cooperative; well developed; well nourished   Skin:  No suspicious lesions seen   Thyroid: normal to inspection and palpation   Lungs:  clear to auscultation bilaterally   Heart:  regular rate and rhythm, S1, S2 normal, no murmur, click, rub or gallop   Breasts:  Examined in supine position  Symmetric without masses or skin dimpling  Nipples normal without inversion, lesions or discharge  There are no palpable axillary nodes  Fibrocystic changes are present both breasts without a discrete mass   Abdomen: soft, non-tender; no masses  no umbilical or inginual hernias are present  no hepato-splenomegaly   Pelvis: Clinical staff was present for exam  External genitalia:  normal appearance of the external genitalia including Bartholin's and [place]'s glands.  Vaginal:  normal pink mucosa without prolapse or lesions.  Cervix:  absent.  Uterus:  absent.  Adnexa:  normal bimanual exam of the adnexa.  Rectal:  anus visually normal appearing. recto-vaginal exam unremarkable and confirms findings;     Lab Review   No data reviewed    Imaging  Mammogram results and DEXA         ASSESSMENT  Problems Addressed this Visit        Musculoskeletal and Integument    Osteopenia       Genitourinary    Post-menopause on HRT (hormone replacement therapy) - Primary    Relevant Medications    estrogens, conjugated, (PREMARIN) 0.9 MG tablet    Other Relevant Orders    DEXA Bone Density Axial       Other    Fibrocystic breast changes, bilateral      Other Visit Diagnoses     Encounter for " gynecological examination without abnormal finding        Relevant Orders    Liquid-based Pap Smear, Screening    DEXA Bone Density Axial          PLAN    Medications prescribed this encounter:    New Medications Ordered This Visit   Medications   • estrogens, conjugated, (PREMARIN) 0.9 MG tablet     Sig: Take 1 tablet by mouth Daily.     Dispense:  90 tablet     Refill:  3   · Pap test done  · DEXA ordered for June 27,2017  · Calcium, 600 mg/ Vit. D, 400 IU daily; regular weight-bearing exercise  · Follow up: 12 month(s)  *Please note that portions of this documentation may have been completed with a voice recognition program.  Efforts were made to edit this dictation, but occasional words may have been mistranscribed.       This note was electronically signed.    DOROTHEA Avila MD  January 16, 2017  1:49 PM     2 bottles of bacardi and some beers/10 or more drinks

## 2018-09-19 ENCOUNTER — OFFICE VISIT (OUTPATIENT)
Dept: FAMILY MEDICINE CLINIC | Facility: CLINIC | Age: 48
End: 2018-09-19

## 2018-09-19 VITALS
RESPIRATION RATE: 18 BRPM | HEART RATE: 72 BPM | DIASTOLIC BLOOD PRESSURE: 74 MMHG | HEIGHT: 65 IN | WEIGHT: 136 LBS | SYSTOLIC BLOOD PRESSURE: 114 MMHG | TEMPERATURE: 98.1 F | BODY MASS INDEX: 22.66 KG/M2

## 2018-09-19 DIAGNOSIS — J06.9 ACUTE URI: Primary | ICD-10-CM

## 2018-09-19 PROCEDURE — 99213 OFFICE O/P EST LOW 20 MIN: CPT | Performed by: FAMILY MEDICINE

## 2018-09-19 RX ORDER — BROMPHENIRAMINE MALEATE, PSEUDOEPHEDRINE HYDROCHLORIDE, AND DEXTROMETHORPHAN HYDROBROMIDE 2; 30; 10 MG/5ML; MG/5ML; MG/5ML
5 SYRUP ORAL 4 TIMES DAILY PRN
Qty: 180 ML | Refills: 0 | Status: SHIPPED | OUTPATIENT
Start: 2018-09-19 | End: 2019-01-21

## 2018-09-19 RX ORDER — AZITHROMYCIN 250 MG/1
TABLET, FILM COATED ORAL
Qty: 6 TABLET | Refills: 0 | Status: SHIPPED | OUTPATIENT
Start: 2018-09-19 | End: 2018-10-01

## 2018-09-19 NOTE — PROGRESS NOTES
Subjective   Ivis Mcginnis is a 48 y.o. female.     History of Present Illness     For the last week has had cough, congestion, body aches  Just feeling bad and not improving  Low grade temp and sweats this AM  ibuprofen helps with her body aches      Review of Systems   Constitutional: Negative for fever.   HENT: Positive for congestion.    Respiratory: Positive for cough.        Objective   Physical Exam   Constitutional: She appears well-developed and well-nourished. No distress.   HENT:   Head: Normocephalic and atraumatic.   Right Ear: Hearing, tympanic membrane, external ear and ear canal normal.   Left Ear: Hearing, tympanic membrane, external ear and ear canal normal.   Nose: Nose normal.   Mouth/Throat: Uvula is midline, oropharynx is clear and moist and mucous membranes are normal.   Eyes: Conjunctivae and EOM are normal.   Neck: Normal range of motion.   Cardiovascular: Normal rate, regular rhythm and normal heart sounds.    Pulmonary/Chest: Effort normal.   Coarse breath sounds   Lymphadenopathy:     She has no cervical adenopathy.   Psychiatric: She has a normal mood and affect. Her behavior is normal.   Nursing note and vitals reviewed.      Assessment/Plan   Ivis was seen today for uri.    Diagnoses and all orders for this visit:    Acute URI    Other orders  -     azithromycin (ZITHROMAX) 250 MG tablet; Take 2 tablets the first day, then 1 tablet daily for 4 days.  -     brompheniramine-pseudoephedrine-DM 30-2-10 MG/5ML syrup; Take 5 mL by mouth 4 (Four) Times a Day As Needed for Cough.    due to duration will treat with Abx as well as cough medicine.  Pt to call back INB

## 2018-09-20 ENCOUNTER — TELEPHONE (OUTPATIENT)
Dept: FAMILY MEDICINE CLINIC | Facility: CLINIC | Age: 48
End: 2018-09-20

## 2018-09-20 NOTE — TELEPHONE ENCOUNTER
----- Message from Natasha Tanner sent at 9/20/2018  8:29 AM EDT -----  Contact: nicolasa barkley pt; med change reqeust   Pt had a reaction the antibiotic she was prescribed yesterday. She wanted to know if something else could be prescribed for her. It gave her a sever headache. She took tylenol but it came right back.     She is allergic to zon  Pharmacy   [facility]

## 2018-09-21 RX ORDER — CEFDINIR 300 MG/1
600 CAPSULE ORAL DAILY
Qty: 14 CAPSULE | Refills: 0 | Status: SHIPPED | OUTPATIENT
Start: 2018-09-21 | End: 2018-10-01

## 2018-10-01 ENCOUNTER — OFFICE VISIT (OUTPATIENT)
Dept: FAMILY MEDICINE CLINIC | Facility: CLINIC | Age: 48
End: 2018-10-01

## 2018-10-01 VITALS
DIASTOLIC BLOOD PRESSURE: 72 MMHG | WEIGHT: 134 LBS | BODY MASS INDEX: 22.3 KG/M2 | TEMPERATURE: 98.1 F | SYSTOLIC BLOOD PRESSURE: 112 MMHG | HEART RATE: 57 BPM | RESPIRATION RATE: 20 BRPM | OXYGEN SATURATION: 98 %

## 2018-10-01 DIAGNOSIS — R53.83 OTHER FATIGUE: ICD-10-CM

## 2018-10-01 DIAGNOSIS — J06.9 PROTRACTED URI: Primary | ICD-10-CM

## 2018-10-01 DIAGNOSIS — R52 BODY ACHES: ICD-10-CM

## 2018-10-01 PROCEDURE — 99213 OFFICE O/P EST LOW 20 MIN: CPT | Performed by: FAMILY MEDICINE

## 2018-10-01 RX ORDER — PREDNISONE 10 MG/1
TABLET ORAL
Qty: 21 TABLET | Refills: 0 | Status: SHIPPED | OUTPATIENT
Start: 2018-10-01 | End: 2019-01-21

## 2018-10-01 RX ORDER — DOXYCYCLINE 100 MG/1
100 CAPSULE ORAL 2 TIMES DAILY
Qty: 14 CAPSULE | Refills: 0 | Status: SHIPPED | OUTPATIENT
Start: 2018-10-01 | End: 2018-10-08

## 2018-10-01 NOTE — PROGRESS NOTES
Farhan Mcginnis is a 48 y.o. female.   Chief Complaint   Patient presents with   • Generalized Body Aches     3X weeks    • Fatigue   • Cough     History of Present Illness   She was seen and treated for URI with Omnicef and cough syrup on 9/19/18.   She continues to have body aches, fatigue, cough. Cough is no longer productive, now dry cough.   +PND  Body aches main in her back, hips. Fatigued very easily   She hasn't been treating with OTC medications.     The following portions of the patient's history were reviewed and updated as appropriate: allergies, current medications, past family history, past medical history, past social history, past surgical history and problem list.    Review of Systems   Constitutional: Positive for fatigue. Negative for chills and fever.   HENT: Positive for postnasal drip. Negative for congestion and sore throat.    Respiratory: Positive for cough. Negative for shortness of breath.    Cardiovascular: Negative for chest pain and palpitations.   Musculoskeletal: Positive for myalgias.   Neurological: Negative for headache.       Objective   Physical Exam   Constitutional: She is oriented to person, place, and time. She appears well-developed and well-nourished.   HENT:   Head: Normocephalic and atraumatic.   Right Ear: Hearing, tympanic membrane, external ear and ear canal normal.   Left Ear: Hearing, tympanic membrane, external ear and ear canal normal.   Nose: Nose normal.   Mouth/Throat: Uvula is midline and mucous membranes are normal. Oropharyngeal exudate present. No posterior oropharyngeal edema or posterior oropharyngeal erythema.   Eyes: Conjunctivae are normal.   Neck: Normal range of motion. Neck supple.   Cardiovascular: Normal rate, regular rhythm and normal heart sounds.    No murmur heard.  Pulmonary/Chest: Effort normal and breath sounds normal. No respiratory distress. She has no wheezes. She has no rhonchi.   Musculoskeletal: She exhibits no deformity.         Thoracic back: She exhibits tenderness.   Lymphadenopathy:     She has no cervical adenopathy.   Neurological: She is alert and oriented to person, place, and time.   Skin: Skin is warm and dry.   Psychiatric: Her behavior is normal.   Nursing note and vitals reviewed.        Assessment/Plan   Ivis was seen today for generalized body aches, fatigue and cough.    Diagnoses and all orders for this visit:    Protracted URI  -     CBC & Differential  -     Basic Metabolic Panel  -     CMV IgG IgM  -     Lex-Barr Virus VCA Antibody Panel  -     predniSONE (DELTASONE) 10 MG tablet; Take 60 mg po day 1, 50 mg day 2, 40 mg day 3, 30 mg day 4, 20 day 5, 10 mg day 6  -     doxycycline (MONODOX) 100 MG capsule; Take 1 capsule by mouth 2 (Two) Times a Day for 7 days.    Other fatigue  -     CBC & Differential  -     TSH  -     Basic Metabolic Panel  -     CMV IgG IgM  -     Lex-Barr Virus VCA Antibody Panel  -     predniSONE (DELTASONE) 10 MG tablet; Take 60 mg po day 1, 50 mg day 2, 40 mg day 3, 30 mg day 4, 20 day 5, 10 mg day 6    Body aches  -     CBC & Differential  -     Basic Metabolic Panel  -     CMV IgG IgM  -     Lex-Barr Virus VCA Antibody Panel  -     predniSONE (DELTASONE) 10 MG tablet; Take 60 mg po day 1, 50 mg day 2, 40 mg day 3, 30 mg day 4, 20 day 5, 10 mg day 6      Labs ordered to evaluate current complaints.  Additional treatment of URI with Prednisone taper and Doxycycline  Follow up if no improvement of symptoms.

## 2018-10-01 NOTE — PATIENT INSTRUCTIONS
"Go to the nearest ER or return to clinic if symptoms worsen, fever/chill develop      Upper Respiratory Infection, Adult  Most upper respiratory infections (URIs) are caused by a virus. A URI affects the nose, throat, and upper air passages. The most common type of URI is often called \"the common cold.\"  Follow these instructions at home:  · Take medicines only as told by your doctor.  · Gargle warm saltwater or take cough drops to comfort your throat as told by your doctor.  · Use a warm mist humidifier or inhale steam from a shower to increase air moisture. This may make it easier to breathe.  · Drink enough fluid to keep your pee (urine) clear or pale yellow.  · Eat soups and other clear broths.  · Have a healthy diet.  · Rest as needed.  · Go back to work when your fever is gone or your doctor says it is okay.  ? You may need to stay home longer to avoid giving your URI to others.  ? You can also wear a face mask and wash your hands often to prevent spread of the virus.  · Use your inhaler more if you have asthma.  · Do not use any tobacco products, including cigarettes, chewing tobacco, or electronic cigarettes. If you need help quitting, ask your doctor.  Contact a doctor if:  · You are getting worse, not better.  · Your symptoms are not helped by medicine.  · You have chills.  · You are getting more short of breath.  · You have brown or red mucus.  · You have yellow or brown discharge from your nose.  · You have pain in your face, especially when you bend forward.  · You have a fever.  · You have puffy (swollen) neck glands.  · You have pain while swallowing.  · You have white areas in the back of your throat.  Get help right away if:  · You have very bad or constant:  ? Headache.  ? Ear pain.  ? Pain in your forehead, behind your eyes, and over your cheekbones (sinus pain).  ? Chest pain.  · You have long-lasting (chronic) lung disease and any of the following:  ? Wheezing.  ? Long-lasting cough.  ? Coughing " up blood.  ? A change in your usual mucus.  · You have a stiff neck.  · You have changes in your:  ? Vision.  ? Hearing.  ? Thinking.  ? Mood.  This information is not intended to replace advice given to you by your health care provider. Make sure you discuss any questions you have with your health care provider.  Document Released: 06/05/2009 Document Revised: 08/20/2017 Document Reviewed: 03/25/2015  Elsevier Interactive Patient Education © 2018 [employer]

## 2018-10-02 ENCOUNTER — TELEPHONE (OUTPATIENT)
Dept: FAMILY MEDICINE CLINIC | Facility: CLINIC | Age: 48
End: 2018-10-02

## 2018-10-02 LAB
BASOPHILS # BLD AUTO: 0.03 10*3/MM3 (ref 0–0.2)
BASOPHILS NFR BLD AUTO: 0.5 % (ref 0–1)
BUN SERPL-MCNC: 17 MG/DL (ref 9–23)
BUN/CREAT SERPL: 17.5 (ref 7–25)
CALCIUM SERPL-MCNC: 9.8 MG/DL (ref 8.7–10.4)
CHLORIDE SERPL-SCNC: 105 MMOL/L (ref 99–109)
CMV IGG SERPL IA-ACNC: <0.6 U/ML (ref 0–0.59)
CMV IGM SERPL IA-ACNC: <30 AU/ML (ref 0–29.9)
CO2 SERPL-SCNC: 24 MMOL/L (ref 20–31)
CREAT SERPL-MCNC: 0.97 MG/DL (ref 0.6–1.3)
EBV EA IGG SER-ACNC: 57.6 U/ML (ref 0–8.9)
EBV NA IGG SER IA-ACNC: 192 U/ML (ref 0–17.9)
EBV VCA IGG SER IA-ACNC: 195 U/ML (ref 0–17.9)
EBV VCA IGM SER IA-ACNC: <36 U/ML (ref 0–35.9)
EOSINOPHIL # BLD AUTO: 0.2 10*3/MM3 (ref 0–0.3)
EOSINOPHIL NFR BLD AUTO: 3.2 % (ref 0–3)
ERYTHROCYTE [DISTWIDTH] IN BLOOD BY AUTOMATED COUNT: 12.3 % (ref 11.3–14.5)
GLUCOSE SERPL-MCNC: 72 MG/DL (ref 70–100)
HCT VFR BLD AUTO: 41.5 % (ref 34.5–44)
HGB BLD-MCNC: 14.5 G/DL (ref 11.5–15.5)
IMM GRANULOCYTES # BLD: 0.01 10*3/MM3 (ref 0–0.03)
IMM GRANULOCYTES NFR BLD: 0.2 % (ref 0–0.6)
LYMPHOCYTES # BLD AUTO: 2.91 10*3/MM3 (ref 0.6–4.8)
LYMPHOCYTES NFR BLD AUTO: 47.2 % (ref 24–44)
MCH RBC QN AUTO: 37.7 PG (ref 27–31)
MCHC RBC AUTO-ENTMCNC: 34.9 G/DL (ref 32–36)
MCV RBC AUTO: 107.8 FL (ref 80–99)
MONOCYTES # BLD AUTO: 0.4 10*3/MM3 (ref 0–1)
MONOCYTES NFR BLD AUTO: 6.5 % (ref 0–12)
NEUTROPHILS # BLD AUTO: 2.61 10*3/MM3 (ref 1.5–8.3)
NEUTROPHILS NFR BLD AUTO: 42.4 % (ref 41–71)
PLATELET # BLD AUTO: 202 10*3/MM3 (ref 150–450)
POTASSIUM SERPL-SCNC: 3.9 MMOL/L (ref 3.5–5.5)
RBC # BLD AUTO: 3.85 10*6/MM3 (ref 3.89–5.14)
SERVICE CMNT-IMP: ABNORMAL
SODIUM SERPL-SCNC: 141 MMOL/L (ref 132–146)
TSH SERPL DL<=0.005 MIU/L-ACNC: 0.69 MIU/ML (ref 0.35–5.35)
WBC # BLD AUTO: 6.16 10*3/MM3 (ref 3.5–10.8)

## 2018-10-02 NOTE — TELEPHONE ENCOUNTER
----- Message from Marilin Hartmann sent at 10/2/2018  4:39 PM EDT -----  Contact: SHAD  PLEASE CALL PATIENT IN THE MORNING, SHE IS OFF FOR FALL BREAK. SO PLEASE GIVE HER A CALL AS SOON AS POSSIBLE.     [phone]

## 2018-10-03 NOTE — TELEPHONE ENCOUNTER
Patient wanted a call about her lab results. Informed her of Dr. Jauregui recommendations. Verbalized a good understanding

## 2018-10-15 ENCOUNTER — OFFICE VISIT (OUTPATIENT)
Dept: FAMILY MEDICINE CLINIC | Facility: CLINIC | Age: 48
End: 2018-10-15

## 2018-10-15 VITALS
SYSTOLIC BLOOD PRESSURE: 118 MMHG | BODY MASS INDEX: 22.33 KG/M2 | RESPIRATION RATE: 18 BRPM | HEART RATE: 72 BPM | TEMPERATURE: 97.5 F | HEIGHT: 65 IN | DIASTOLIC BLOOD PRESSURE: 74 MMHG | WEIGHT: 134 LBS

## 2018-10-15 DIAGNOSIS — B27.90 MONONUCLEOSIS: Primary | ICD-10-CM

## 2018-10-15 DIAGNOSIS — R05.9 COUGH: ICD-10-CM

## 2018-10-15 PROCEDURE — 99214 OFFICE O/P EST MOD 30 MIN: CPT | Performed by: FAMILY MEDICINE

## 2018-10-15 RX ORDER — LORATADINE, PSEUDOEPHEDRINE SULFATE 5; 120 MG/1; MG/1
TABLET, FILM COATED, EXTENDED RELEASE ORAL
Qty: 180 TABLET | Refills: 0 | OUTPATIENT
Start: 2018-10-15

## 2018-11-12 RX ORDER — LORATADINE, PSEUDOEPHEDRINE SULFATE 5; 120 MG/1; MG/1
TABLET, FILM COATED, EXTENDED RELEASE ORAL
Qty: 180 TABLET | Refills: 0 | Status: SHIPPED | OUTPATIENT
Start: 2018-11-12 | End: 2019-01-21

## 2018-12-17 DIAGNOSIS — Z79.890 POST-MENOPAUSE ON HRT (HORMONE REPLACEMENT THERAPY): ICD-10-CM

## 2018-12-17 RX ORDER — CONJUGATED ESTROGENS 0.62 MG/1
TABLET, FILM COATED ORAL
Qty: 90 TABLET | Refills: 0 | Status: SHIPPED | OUTPATIENT
Start: 2018-12-17 | End: 2019-01-21 | Stop reason: SDUPTHER

## 2018-12-19 ENCOUNTER — TELEPHONE (OUTPATIENT)
Dept: OBSTETRICS AND GYNECOLOGY | Facility: CLINIC | Age: 48
End: 2018-12-19

## 2018-12-19 NOTE — TELEPHONE ENCOUNTER
PT IS CALLING NEEDS HER PREMARIN REFILLED AND IN ASKING ABOUT A COUPON TO BE SENT TO HER   PLZ SEND TO [facility] -- PHARMACY ADVISED IF COULD SEND A COUPON TO THEM THEY WOULD ACCEPT  PLZ CALL PT WHEN TAKEN CARE OF

## 2018-12-19 NOTE — TELEPHONE ENCOUNTER
I called Ivis and advised her to go to [employer] to apply for a savings card.  The cards in the office have to be activated by the patient.  She will do this, and if she needs further help from us, will call back.

## 2018-12-24 RX ORDER — VALACYCLOVIR HYDROCHLORIDE 1 G/1
TABLET, FILM COATED ORAL
Qty: 90 TABLET | Refills: 0 | Status: SHIPPED | OUTPATIENT
Start: 2018-12-24 | End: 2019-04-01 | Stop reason: SDUPTHER

## 2019-01-21 ENCOUNTER — OFFICE VISIT (OUTPATIENT)
Dept: OBSTETRICS AND GYNECOLOGY | Facility: CLINIC | Age: 49
End: 2019-01-21

## 2019-01-21 VITALS
HEIGHT: 65 IN | DIASTOLIC BLOOD PRESSURE: 64 MMHG | WEIGHT: 133 LBS | BODY MASS INDEX: 22.16 KG/M2 | SYSTOLIC BLOOD PRESSURE: 110 MMHG

## 2019-01-21 DIAGNOSIS — M85.851 OSTEOPENIA OF RIGHT HIP: ICD-10-CM

## 2019-01-21 DIAGNOSIS — N60.11 FIBROCYSTIC BREAST CHANGES, BILATERAL: ICD-10-CM

## 2019-01-21 DIAGNOSIS — N60.12 FIBROCYSTIC BREAST CHANGES, BILATERAL: ICD-10-CM

## 2019-01-21 DIAGNOSIS — Z01.419 ENCOUNTER FOR GYNECOLOGICAL EXAMINATION WITHOUT ABNORMAL FINDING: ICD-10-CM

## 2019-01-21 DIAGNOSIS — Z79.890 POST-MENOPAUSE ON HRT (HORMONE REPLACEMENT THERAPY): Primary | ICD-10-CM

## 2019-01-21 PROCEDURE — 99396 PREV VISIT EST AGE 40-64: CPT | Performed by: OBSTETRICS & GYNECOLOGY

## 2019-01-21 RX ORDER — AMOXICILLIN 500 MG/1
1 CAPSULE ORAL 3 TIMES DAILY
COMMUNITY
Start: 2019-01-16 | End: 2019-02-08

## 2019-01-21 RX ORDER — HEPATITIS A VACCINE, INACTIVATED 50 [IU]/ML
INJECTION, SUSPENSION INTRAMUSCULAR
COMMUNITY
Start: 2018-12-20 | End: 2021-05-10 | Stop reason: ALTCHOICE

## 2019-01-21 RX ORDER — LORATADINE/PSEUDOEPHEDRINE 10MG-240MG
1 TABLET, EXTENDED RELEASE 24 HR ORAL DAILY
COMMUNITY
Start: 2018-11-12 | End: 2019-03-11 | Stop reason: SDUPTHER

## 2019-01-21 NOTE — PROGRESS NOTES
Chief Complaint   Patient presents with   • Gynecologic Exam   • Med Refill       Ivis Mcginnis is a 48 y.o. year old  presenting to be seen for her annual exam.  This patient has previously had a laparoscopy with laser ablation of endometriosis and has had an LAVH/VCS.  She has a personal history of leukemia.  She is disease-free at present.  She takes Premarin, 0.625 mg daily and is asymptomatic.  She has no side effects on this medication.  She has a history of mild osteopenia of the right hip.  She has had no atraumatic fractures.  She denies bowel or urinary symptoms.      SCREENING TESTS    Year 2012   Age                         PAP                         HPV high risk                         Mammogram       benign                  NILO score                         Breast MRI                         Lipids                         Vitamin D                         Colonoscopy                         DEXA  Frax (hip/any)      osteopenia                   Ovarian Screen                             She exercises regularly: yes.  She wears her seat belt: yes.  She has concerns about domestic violence: no.  She has noticed changes in height: no    GYN screening history:  · Last mammogram: was done on approximately 1/15/2018 and the result was: Birads II (Benign findings).   · DEXA was done on 2017 and the results were mild osteopenia of the right hip and femoral neck.    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  She does not have  "any pertinent problems on file.  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  Her family history includes Hypertension in her father; Kidney disease in her mother.  She  reports that  has never smoked. she has never used smokeless tobacco. She reports that she does not drink alcohol or use drugs.  Current Outpatient Medications   Medication Sig Dispense Refill   • Triamcinolone Acetonide (KENALOG IJ) Inject 1 dose as directed.     • amoxicillin (AMOXIL) 500 MG capsule Take 1 capsule by mouth 3 (Three) Times a Day.     • aspirin 81 MG chewable tablet Chew 81 mg Every 3 (Three) Days.     • Cholecalciferol (VITAMIN D) 2000 UNITS capsule Take  by mouth.     • estrogens, conjugated, (PREMARIN) 0.625 MG tablet Take 1 tablet by mouth Daily. Take daily for 21 days then do not take for 7 days. 90 tablet 3   • LORATADINE-D 24HR  MG per 24 hr tablet Take 1 tablet by mouth Daily.     • SYNTHROID 75 MCG tablet      • topiramate (TOPAMAX) 50 MG tablet TAKE ONE TABLET BY MOUTH TWICE DAILY 180 tablet 0   • valACYclovir (VALTREX) 1000 MG tablet TAKE 1 TABLET BY MOUTH ONCE DAILY 90 tablet 0   • VAQTA 50 UNIT/ML injection        No current facility-administered medications for this visit.      She is allergic to pentamidine and piperacillin sod-tazobactam so..    Review of Systems  A comprehensive review of systems was taken.  Constitutional: negative for fever, chills, activity change, appetite change, fatigue and unexpected weight change.  Respiratory: negative  Cardiovascular: negative  Gastrointestinal: negative  Genitourinary:negative  Musculoskeletal:negative  Behavioral/Psych: negative       /64   Ht 165.1 cm (65\")   Wt 60.3 kg (133 lb)   BMI 22.13 kg/m²     Physical Exam    General:  alert; cooperative; well developed; well nourished   Skin:  No suspicious lesions seen "   Thyroid: normal to inspection and palpation   Lungs:  clear to auscultation bilaterally   Heart:  regular rate and rhythm, S1, S2 normal, no murmur, click, rub or gallop   Breasts:  Examined in supine position  Symmetric without masses or skin dimpling  Nipples normal without inversion, lesions or discharge  There are no palpable axillary nodes  Fibrocystic changes are present both breasts without a discrete mass   Abdomen: soft, non-tender; no masses  no umbilical or inguinal hernias are present  no hepato-splenomegaly   Pelvis: Clinical staff was present for exam  External genitalia:  normal appearance of the external genitalia including [place]'s and [place]'s glands.  Vaginal:  normal pink mucosa without prolapse or lesions.  Cervix:  absent.  Uterus:  absent.  Adnexa:  non palpable bilaterally.  Rectal:  anus visually normal appearing. recto-vaginal exam unremarkable and confirms findings;     Lab Review   No data reviewed    Imaging  Mammogram results and DEXA         ASSESSMENT  Problems Addressed this Visit        Musculoskeletal and Integument    Osteopenia       Genitourinary    Post-menopause on HRT (hormone replacement therapy) - Primary    Relevant Medications    estrogens, conjugated, (PREMARIN) 0.625 MG tablet       Other    Fibrocystic breast changes, bilateral      Other Visit Diagnoses     Encounter for gynecological examination without abnormal finding              PLAN    Medications prescribed this encounter:    New Medications Ordered This Visit   Medications   • estrogens, conjugated, (PREMARIN) 0.625 MG tablet     Sig: Take 1 tablet by mouth Daily. Take daily for 21 days then do not take for 7 days.     Dispense:  90 tablet     Refill:  3   · Monthly self breast assessment and annual breast imaging  · I have reviewed with the patient the risks and benefits of estrogen replacement therapy.  · Repeat DEXA in June 2020  · Calcium, 600 mg/ Vit. D, 400 IU daily; regular weight-bearing  exercise  · Follow up: 12 month(s)  *Please note that portions of this documentation may have been completed with a voice recognition program.  Efforts were made to edit this dictation, but occasional words may have been mistranscribed.       This note was electronically signed.    DOROTHEA Avila MD  January 21, 2019  2:30 PM

## 2019-02-08 ENCOUNTER — OFFICE VISIT (OUTPATIENT)
Dept: FAMILY MEDICINE CLINIC | Facility: CLINIC | Age: 49
End: 2019-02-08

## 2019-02-08 VITALS
WEIGHT: 131 LBS | DIASTOLIC BLOOD PRESSURE: 74 MMHG | HEART RATE: 72 BPM | RESPIRATION RATE: 16 BRPM | BODY MASS INDEX: 21.83 KG/M2 | HEIGHT: 65 IN | TEMPERATURE: 97.5 F | SYSTOLIC BLOOD PRESSURE: 120 MMHG

## 2019-02-08 DIAGNOSIS — N30.01 ACUTE CYSTITIS WITH HEMATURIA: Primary | ICD-10-CM

## 2019-02-08 LAB
BILIRUB BLD-MCNC: NEGATIVE MG/DL
CLARITY, POC: ABNORMAL
COLOR UR: YELLOW
GLUCOSE UR STRIP-MCNC: NEGATIVE MG/DL
KETONES UR QL: NEGATIVE
LEUKOCYTE EST, POC: ABNORMAL
NITRITE UR-MCNC: NEGATIVE MG/ML
PH UR: 6 [PH] (ref 5–8)
PROT UR STRIP-MCNC: ABNORMAL MG/DL
RBC # UR STRIP: ABNORMAL /UL
SP GR UR: 1.03 (ref 1–1.03)
UROBILINOGEN UR QL: NORMAL

## 2019-02-08 PROCEDURE — 81003 URINALYSIS AUTO W/O SCOPE: CPT | Performed by: FAMILY MEDICINE

## 2019-02-08 PROCEDURE — 99213 OFFICE O/P EST LOW 20 MIN: CPT | Performed by: FAMILY MEDICINE

## 2019-02-08 RX ORDER — PHENAZOPYRIDINE HYDROCHLORIDE 200 MG/1
200 TABLET, FILM COATED ORAL 3 TIMES DAILY PRN
Qty: 10 TABLET | Refills: 0 | Status: SHIPPED | OUTPATIENT
Start: 2019-02-08 | End: 2019-05-01

## 2019-02-08 RX ORDER — CIPROFLOXACIN 500 MG/1
500 TABLET, FILM COATED ORAL 2 TIMES DAILY
Qty: 6 TABLET | Refills: 0 | Status: SHIPPED | OUTPATIENT
Start: 2019-02-08 | End: 2019-05-01

## 2019-02-08 NOTE — PROGRESS NOTES
Subjective   Ivis Mcginnis is a 48 y.o. female.     History of Present Illness     Started this AM with dysuria, urgency and freuqency and then had blood in her urine  No N/V/D      Review of Systems   Constitutional: Negative.  Negative for fever.   Genitourinary: Positive for dysuria, frequency and urgency.       Objective   Physical Exam   Constitutional: She appears well-developed and well-nourished. No distress.   Cardiovascular: Normal rate, regular rhythm and normal heart sounds.   Pulmonary/Chest: Effort normal and breath sounds normal.   Abdominal: Soft. Bowel sounds are normal. She exhibits no distension. There is no tenderness.   Psychiatric: She has a normal mood and affect. Her behavior is normal.   Nursing note and vitals reviewed.      Assessment/Plan   Ivis was seen today for urinary tract infection.    Diagnoses and all orders for this visit:    Acute cystitis with hematuria  -     phenazopyridine (PYRIDIUM) 200 MG tablet; Take 1 tablet by mouth 3 (Three) Times a Day As Needed for bladder spasms.  -     ciprofloxacin (CIPRO) 500 MG tablet; Take 1 tablet by mouth 2 (Two) Times a Day.  -     Urine Culture - , Urine, Clean Catch  -     POCT urinalysis dipstick, automated    treat with cipro and pyridium, increase fluids.  Pt to call back INB and will f/u pending urine culture if warranted

## 2019-02-10 LAB
BACTERIA UR CULT: NORMAL
BACTERIA UR CULT: NORMAL

## 2019-03-11 ENCOUNTER — TELEPHONE (OUTPATIENT)
Dept: FAMILY MEDICINE CLINIC | Facility: CLINIC | Age: 49
End: 2019-03-11

## 2019-03-11 NOTE — TELEPHONE ENCOUNTER
----- Message from Marilin Villeda sent at 3/11/2019  8:17 AM EDT -----  Contact: KRISH;PT CALLED  PT HAS THRUSH IN HER MOUTH-HAS HAD IT PREVIOUSLY BUT SHE HAS WHITE PATCHES ON INSIDE OF CHEEKS AND EVERYTHING TASTE LIKE SALT X COUPLE  OF DAYS    PHARMACY [facility]    [phone]

## 2019-04-01 RX ORDER — VALACYCLOVIR HYDROCHLORIDE 1 G/1
TABLET, FILM COATED ORAL
Qty: 90 TABLET | Refills: 0 | Status: SHIPPED | OUTPATIENT
Start: 2019-04-01 | End: 2019-05-28 | Stop reason: SDUPTHER

## 2019-04-07 DIAGNOSIS — Z79.890 POST-MENOPAUSE ON HRT (HORMONE REPLACEMENT THERAPY): ICD-10-CM

## 2019-04-08 RX ORDER — CONJUGATED ESTROGENS 0.62 MG/1
TABLET, FILM COATED ORAL
Qty: 90 TABLET | Refills: 2 | Status: SHIPPED | OUTPATIENT
Start: 2019-04-08 | End: 2019-12-30

## 2019-04-08 RX ORDER — TOPIRAMATE 50 MG/1
TABLET, FILM COATED ORAL
Qty: 180 TABLET | Refills: 0 | OUTPATIENT
Start: 2019-04-08

## 2019-04-09 RX ORDER — TOPIRAMATE 50 MG/1
TABLET, FILM COATED ORAL
Qty: 180 TABLET | Refills: 0 | Status: SHIPPED | OUTPATIENT
Start: 2019-04-09 | End: 2019-05-01

## 2019-05-01 ENCOUNTER — OFFICE VISIT (OUTPATIENT)
Dept: FAMILY MEDICINE CLINIC | Facility: CLINIC | Age: 49
End: 2019-05-01

## 2019-05-01 VITALS
OXYGEN SATURATION: 98 % | RESPIRATION RATE: 18 BRPM | DIASTOLIC BLOOD PRESSURE: 68 MMHG | SYSTOLIC BLOOD PRESSURE: 110 MMHG | WEIGHT: 127 LBS | TEMPERATURE: 97.4 F | HEART RATE: 78 BPM | BODY MASS INDEX: 21.13 KG/M2

## 2019-05-01 DIAGNOSIS — N30.01 ACUTE CYSTITIS WITH HEMATURIA: Primary | ICD-10-CM

## 2019-05-01 DIAGNOSIS — R30.0 DYSURIA: ICD-10-CM

## 2019-05-01 LAB
BILIRUB BLD-MCNC: NEGATIVE MG/DL
CLARITY, POC: ABNORMAL
COLOR UR: YELLOW
EXPIRATION DATE: ABNORMAL
GLUCOSE UR STRIP-MCNC: NEGATIVE MG/DL
KETONES UR QL: NEGATIVE
LEUKOCYTE EST, POC: ABNORMAL
Lab: ABNORMAL
NITRITE UR-MCNC: NEGATIVE MG/ML
PH UR: 6 [PH] (ref 5–8)
PROT UR STRIP-MCNC: NEGATIVE MG/DL
RBC # UR STRIP: ABNORMAL /UL
SP GR UR: 1.02 (ref 1–1.03)
UROBILINOGEN UR QL: NORMAL

## 2019-05-01 PROCEDURE — 81003 URINALYSIS AUTO W/O SCOPE: CPT | Performed by: PHYSICIAN ASSISTANT

## 2019-05-01 PROCEDURE — 99213 OFFICE O/P EST LOW 20 MIN: CPT | Performed by: PHYSICIAN ASSISTANT

## 2019-05-01 RX ORDER — PHENAZOPYRIDINE HYDROCHLORIDE 100 MG/1
100 TABLET, FILM COATED ORAL 3 TIMES DAILY PRN
Qty: 30 TABLET | Refills: 0 | Status: SHIPPED | OUTPATIENT
Start: 2019-05-01 | End: 2020-11-03

## 2019-05-01 RX ORDER — NITROFURANTOIN 25; 75 MG/1; MG/1
100 CAPSULE ORAL 2 TIMES DAILY
Qty: 14 CAPSULE | Refills: 0 | Status: SHIPPED | OUTPATIENT
Start: 2019-05-01 | End: 2019-08-14

## 2019-05-01 NOTE — PROGRESS NOTES
Subjective   Ivis Mcginnis is a 48 y.o. female.     History of Present Illness   Pt presents with CC of dysuria, urinary frequency, difficulty urinating, dull R flank pain and hematuria. Symptoms started yesterday with back pain and with dysuria today. Hx of frequent UTIs over the last several years. This has been a concern of patients. She guesses there may be some correlation to intercourse, however tries to urinate after. In the past was on prophylactic antibiotic after intercourse.   Pt has no uterus. Ovaries remain. Postmenopausal following chemo for leukemia. Denies vaginal dryness, discharge, bleeding or irritation. No issues with leaking urine. Does not wear panty liners  No fever, N/V  States each time she notices she gets a UTI she has visible blood  No hx of kidney stones or IC   Pt has never been evaluated by urology   Of note last UTI in Feb did not grow infection on culture and she did have the same symptoms as she does today     The following portions of the patient's history were reviewed and updated as appropriate: allergies, current medications, past family history, past medical history, past social history, past surgical history and problem list.    Review of Systems   Constitutional: Negative.  Negative for chills, diaphoresis, fatigue and fever.   HENT: Negative.  Negative for congestion, ear discharge, ear pain, hearing loss, nosebleeds, postnasal drip, sinus pressure, sneezing and sore throat.    Eyes: Negative.    Respiratory: Negative.  Negative for cough, chest tightness, shortness of breath and wheezing.    Cardiovascular: Negative.  Negative for chest pain, palpitations and leg swelling.   Gastrointestinal: Negative for abdominal distention, abdominal pain, blood in stool, constipation, diarrhea, nausea and vomiting.   Genitourinary: Positive for difficulty urinating, dysuria, flank pain, frequency, hematuria and urgency. Negative for dyspareunia, pelvic pain, vaginal bleeding, vaginal  discharge and vaginal pain.   Musculoskeletal: Negative for arthralgias, back pain, gait problem, joint swelling, myalgias, neck pain and neck stiffness.   Skin: Negative.  Negative for color change, pallor, rash and wound.   Neurological: Negative for dizziness, syncope, weakness, light-headedness, numbness and headaches.       Objective    Blood pressure 110/68, pulse 78, temperature 97.4 °F (36.3 °C), resp. rate 18, weight 57.6 kg (127 lb), SpO2 98 %.     Physical Exam   Constitutional: She is oriented to person, place, and time. She appears well-developed and well-nourished.   HENT:   Head: Normocephalic and atraumatic.   Right Ear: Tympanic membrane, external ear and ear canal normal.   Left Ear: Tympanic membrane, external ear and ear canal normal.   Nose: Nose normal.   Mouth/Throat: Oropharynx is clear and moist. No oropharyngeal exudate.   Eyes: Conjunctivae are normal.   Cardiovascular: Normal rate, regular rhythm, normal heart sounds and intact distal pulses. Exam reveals no gallop and no friction rub.   No murmur heard.  Pulmonary/Chest: Effort normal and breath sounds normal. No respiratory distress. She has no wheezes. She has no rales. She exhibits no tenderness.   Abdominal: Soft. Bowel sounds are normal. She exhibits no distension and no mass. There is no tenderness. There is no rebound and no guarding. No hernia.   Neurological: She is alert and oriented to person, place, and time.   Skin: Skin is warm and dry.   Psychiatric: She has a normal mood and affect. Her behavior is normal. Judgment and thought content normal.   Nursing note and vitals reviewed.      Assessment/Plan   Ivis was seen today for flank pain and difficulty urinating.    Diagnoses and all orders for this visit:    Acute cystitis with hematuria  -     Urine Culture - Urine, Urine, Clean Catch  -     nitrofurantoin, macrocrystal-monohydrate, (MACROBID) 100 MG capsule; Take 1 capsule by mouth 2 (Two) Times a Day.    Dysuria  -      POCT urinalysis dipstick, automated  -     phenazopyridine (PYRIDIUM) 100 MG tablet; Take 1 tablet by mouth 3 (Three) Times a Day As Needed for bladder spasms.    UA showed increase leuks and 3+ blood. Will send for culture and begin treatment as outlined in plan   Depending on urine culture will take next plan of action. IF negative, strongly encourage urology consult to rule out other contributing factors to symptoms. If positive, complete treatment and f/u once completed for repeat urine culture to make sure she is clearing infection. Pt asks about prophylactic abx after intercourse for UTI, informed patient this is an option, but would encourage urology consult prior to this step.

## 2019-05-03 LAB
BACTERIA UR CULT: NORMAL
BACTERIA UR CULT: NORMAL

## 2019-05-06 ENCOUNTER — TELEPHONE (OUTPATIENT)
Dept: FAMILY MEDICINE CLINIC | Facility: CLINIC | Age: 49
End: 2019-05-06

## 2019-05-06 DIAGNOSIS — N39.0 FREQUENT UTI: ICD-10-CM

## 2019-05-06 DIAGNOSIS — R30.0 DYSURIA: Primary | ICD-10-CM

## 2019-05-06 DIAGNOSIS — R31.9 HEMATURIA, UNSPECIFIED TYPE: ICD-10-CM

## 2019-05-06 NOTE — TELEPHONE ENCOUNTER
Please check with [facility], but do not believe [facility] has urology we can [facility] her to [facility] per her [facility]. Please have someone call her back for her additional questions. LINDA

## 2019-05-06 NOTE — TELEPHONE ENCOUNTER
----- Message from Marilin Villeda sent at 5/6/2019 10:03 AM EDT -----  Contact: ENRIQUE;PT CALLED  Notes recorded by Enrique Verdugo PA on 5/6/2019 at 8:23 AM EDT  Urine culture is again just showing normal jenn without evidence of infection. Encourage urology consult if patient is agreeable. [facility]    PT WAS GIVEN ABOVE INFO-SHE WOULD LIKE A REFERRAL TO [facility]  PT HAS ANOTHER QUESTION AND WOULD LIKE A CALL BACK FROM NURSE  [phone]

## 2019-05-06 NOTE — TELEPHONE ENCOUNTER
Spoke with patient, she states another symptom that she is having is upper back pain that has been going on a while/more long term. She wants to know if that could be related to her current issue she is being referred to urology for, informed she would need a follow up appt for evaluation. Declined to schedule at this time saying she was just in but forgot to mention it. Just wanted to run this by Michelle and get her opinion. Please advise

## 2019-05-28 RX ORDER — VALACYCLOVIR HYDROCHLORIDE 1 G/1
TABLET, FILM COATED ORAL
Qty: 90 TABLET | Refills: 0 | Status: SHIPPED | OUTPATIENT
Start: 2019-05-28 | End: 2019-09-30 | Stop reason: SDUPTHER

## 2019-06-03 ENCOUNTER — TELEPHONE (OUTPATIENT)
Dept: FAMILY MEDICINE CLINIC | Facility: CLINIC | Age: 49
End: 2019-06-03

## 2019-06-03 RX ORDER — CIPROFLOXACIN 500 MG/1
500 TABLET, FILM COATED ORAL 2 TIMES DAILY
Qty: 6 TABLET | Refills: 0 | Status: SHIPPED | OUTPATIENT
Start: 2019-06-03 | End: 2019-08-14

## 2019-06-03 NOTE — TELEPHONE ENCOUNTER
----- Message from Laurent Olvera sent at 6/3/2019 10:51 AM EDT -----  Contact: KRISH / PT CALL  PT CALLED AND STATED THAT SHE HAS FREQUENT UTI'S  - SHE IS LEAVING TO GO ON VACATION FOR 2 WEEKS AND WOULD LIKE SOMETHING CALLED IN FOR PREVENTIVE - SHE IS FEARFUL SHE WILL GET ON WHILE SHE IS ON VACATION - THEY LEAVE EARLY FRIAY AM - SHE DOES HAVE AN APPT WITH DR SLATER ON 6/6 TO DISCUSS HER UTI'S.  PLEASE CALL PT IS SOMETHING IS CALLED  - PT HAS V/M    [facility]  PT CALL BACK [phone]

## 2019-08-14 ENCOUNTER — OFFICE VISIT (OUTPATIENT)
Dept: FAMILY MEDICINE CLINIC | Facility: CLINIC | Age: 49
End: 2019-08-14

## 2019-08-14 VITALS
OXYGEN SATURATION: 99 % | SYSTOLIC BLOOD PRESSURE: 104 MMHG | TEMPERATURE: 98 F | WEIGHT: 129 LBS | HEART RATE: 81 BPM | HEIGHT: 65 IN | RESPIRATION RATE: 16 BRPM | BODY MASS INDEX: 21.49 KG/M2 | DIASTOLIC BLOOD PRESSURE: 70 MMHG

## 2019-08-14 DIAGNOSIS — J01.00 ACUTE NON-RECURRENT MAXILLARY SINUSITIS: Primary | ICD-10-CM

## 2019-08-14 PROCEDURE — 99213 OFFICE O/P EST LOW 20 MIN: CPT | Performed by: FAMILY MEDICINE

## 2019-08-14 RX ORDER — SULFAMETHOXAZOLE AND TRIMETHOPRIM 800; 160 MG/1; MG/1
1 TABLET ORAL 2 TIMES DAILY
Qty: 20 TABLET | Refills: 0 | Status: SHIPPED | OUTPATIENT
Start: 2019-08-14 | End: 2019-09-26

## 2019-08-14 RX ORDER — PREDNISONE 20 MG/1
40 TABLET ORAL DAILY
Qty: 10 TABLET | Refills: 0 | Status: SHIPPED | OUTPATIENT
Start: 2019-08-14 | End: 2019-09-26

## 2019-08-14 NOTE — PROGRESS NOTES
Subjective   Ivis Mcginnis is a 49 y.o. female.     History of Present Illness     She started to feel ill last week with ST and back ache  Then developed cough and more congestion  No SOA, no fever but has had chills and sweats  No OTC medicine  Body aches and Head pressure      Review of Systems   Constitutional: Negative for fever.   HENT: Positive for congestion and sinus pressure.    Respiratory: Positive for cough.        Objective   Physical Exam   Constitutional: She appears well-developed and well-nourished.   HENT:   Head: Normocephalic and atraumatic.   Right Ear: Hearing, tympanic membrane, external ear and ear canal normal.   Left Ear: Hearing, tympanic membrane, external ear and ear canal normal.   Nose: Right sinus exhibits maxillary sinus tenderness. Left sinus exhibits maxillary sinus tenderness.   Mouth/Throat: Uvula is midline, oropharynx is clear and moist and mucous membranes are normal.   Eyes: Conjunctivae and EOM are normal.   Neck: Normal range of motion.   Cardiovascular: Normal rate, regular rhythm and normal heart sounds.   Pulmonary/Chest: Effort normal and breath sounds normal.   Lymphadenopathy:     She has no cervical adenopathy.   Psychiatric: She has a normal mood and affect. Her behavior is normal.   Nursing note and vitals reviewed.      Assessment/Plan   Ivis was seen today for cough, nasal congestion and headache.    Diagnoses and all orders for this visit:    Acute non-recurrent maxillary sinusitis  -     sulfamethoxazole-trimethoprim (BACTRIM DS) 800-160 MG per tablet; Take 1 tablet by mouth 2 (Two) Times a Day.  -     predniSONE (DELTASONE) 20 MG tablet; Take 2 tablets by mouth Daily.    pred and bactrim, f/u INB.  Pt agrees

## 2019-09-26 ENCOUNTER — OFFICE VISIT (OUTPATIENT)
Dept: FAMILY MEDICINE CLINIC | Facility: CLINIC | Age: 49
End: 2019-09-26

## 2019-09-26 VITALS
DIASTOLIC BLOOD PRESSURE: 70 MMHG | SYSTOLIC BLOOD PRESSURE: 110 MMHG | TEMPERATURE: 98 F | HEART RATE: 80 BPM | RESPIRATION RATE: 16 BRPM | WEIGHT: 130 LBS | BODY MASS INDEX: 21.66 KG/M2 | HEIGHT: 65 IN

## 2019-09-26 DIAGNOSIS — R30.0 DYSURIA: ICD-10-CM

## 2019-09-26 DIAGNOSIS — R82.998 URINE LEUKOCYTES: Primary | ICD-10-CM

## 2019-09-26 LAB
BILIRUB BLD-MCNC: NEGATIVE MG/DL
CLARITY, POC: CLEAR
COLOR UR: YELLOW
GLUCOSE UR STRIP-MCNC: NEGATIVE MG/DL
KETONES UR QL: NEGATIVE
LEUKOCYTE EST, POC: ABNORMAL
NITRITE UR-MCNC: NEGATIVE MG/ML
PH UR: 6 [PH] (ref 5–8)
PROT UR STRIP-MCNC: NEGATIVE MG/DL
RBC # UR STRIP: ABNORMAL /UL
SP GR UR: 1.02 (ref 1–1.03)
UROBILINOGEN UR QL: NORMAL

## 2019-09-26 PROCEDURE — 81003 URINALYSIS AUTO W/O SCOPE: CPT | Performed by: NURSE PRACTITIONER

## 2019-09-26 PROCEDURE — 99213 OFFICE O/P EST LOW 20 MIN: CPT | Performed by: NURSE PRACTITIONER

## 2019-09-26 NOTE — PROGRESS NOTES
Subjective   Ivis Mcginnis is a 49 y.o. female.     History of Present Illness     The following portions of the patient's history were reviewed and updated as appropriate: allergies, current medications, past family history, past medical history, past social history, past surgical history and problem list.    Review of Systems    Objective   Physical Exam      Assessment/Plan   Ivis was seen today for difficulty urinating.    Diagnoses and all orders for this visit:    Urine leukocytes  -     Urine Culture - Urine, Urine, Clean Catch    Dysuria  -     POCT urinalysis dipstick, automated

## 2019-09-29 LAB
BACTERIA UR CULT: ABNORMAL
BACTERIA UR CULT: ABNORMAL
OTHER ANTIBIOTIC SUSC ISLT: ABNORMAL

## 2019-09-29 RX ORDER — CIPROFLOXACIN 500 MG/1
500 TABLET, FILM COATED ORAL 2 TIMES DAILY
Qty: 14 TABLET | Refills: 0 | Status: SHIPPED | OUTPATIENT
Start: 2019-09-29 | End: 2020-01-20

## 2019-09-30 RX ORDER — TOPIRAMATE 50 MG/1
TABLET, FILM COATED ORAL
Qty: 180 TABLET | Refills: 0 | Status: SHIPPED | OUTPATIENT
Start: 2019-09-30 | End: 2020-01-20

## 2019-09-30 RX ORDER — VALACYCLOVIR HYDROCHLORIDE 1 G/1
TABLET, FILM COATED ORAL
Qty: 90 TABLET | Refills: 1 | Status: SHIPPED | OUTPATIENT
Start: 2019-09-30 | End: 2020-03-09

## 2019-12-30 DIAGNOSIS — Z79.890 POST-MENOPAUSE ON HRT (HORMONE REPLACEMENT THERAPY): ICD-10-CM

## 2019-12-30 RX ORDER — CONJUGATED ESTROGENS 0.62 MG/1
TABLET, FILM COATED ORAL
Qty: 90 TABLET | Refills: 0 | Status: SHIPPED | OUTPATIENT
Start: 2019-12-30 | End: 2020-01-20 | Stop reason: SDUPTHER

## 2020-01-02 ENCOUNTER — TELEPHONE (OUTPATIENT)
Dept: OBSTETRICS AND GYNECOLOGY | Facility: CLINIC | Age: 50
End: 2020-01-02

## 2020-01-02 NOTE — TELEPHONE ENCOUNTER
Attempted to return patient's call.  No answer, but I left a message asking Ivis to return my call tomorrow.

## 2020-01-03 NOTE — TELEPHONE ENCOUNTER
Patient called back and states that she went to the [facility] pharmacy and her prescription for Premarin tablets was over $90.  She previously has used a coupon/code for discount and is asking about that now.  I let her know that she can go online and get that processed.  She thinks that I called her pharmacy and gave them a code last time.  I told her that the code isn't valid without the patient activating the code, and advised her to attempt to do this.  She will call back if she needs further help.

## 2020-01-20 ENCOUNTER — OFFICE VISIT (OUTPATIENT)
Dept: OBSTETRICS AND GYNECOLOGY | Facility: CLINIC | Age: 50
End: 2020-01-20

## 2020-01-20 VITALS
DIASTOLIC BLOOD PRESSURE: 70 MMHG | WEIGHT: 131.4 LBS | HEIGHT: 65 IN | SYSTOLIC BLOOD PRESSURE: 120 MMHG | BODY MASS INDEX: 21.89 KG/M2

## 2020-01-20 DIAGNOSIS — Z79.890 POST-MENOPAUSE ON HRT (HORMONE REPLACEMENT THERAPY): Primary | ICD-10-CM

## 2020-01-20 DIAGNOSIS — N60.12 FIBROCYSTIC BREAST CHANGES, BILATERAL: ICD-10-CM

## 2020-01-20 DIAGNOSIS — N60.11 FIBROCYSTIC BREAST CHANGES, BILATERAL: ICD-10-CM

## 2020-01-20 DIAGNOSIS — Z01.419 ENCOUNTER FOR GYNECOLOGICAL EXAMINATION WITHOUT ABNORMAL FINDING: ICD-10-CM

## 2020-01-20 PROCEDURE — 99396 PREV VISIT EST AGE 40-64: CPT | Performed by: OBSTETRICS & GYNECOLOGY

## 2020-01-20 RX ORDER — GLUCOSAMINE/D3/BOSWELLIA SERRA 1500MG-400
1 TABLET ORAL DAILY
COMMUNITY

## 2020-01-20 RX ORDER — TRIAMCINOLONE ACETONIDE 1 MG/G
1 CREAM TOPICAL AS NEEDED
COMMUNITY
End: 2021-05-10 | Stop reason: ALTCHOICE

## 2020-01-20 RX ORDER — LEVOTHYROXINE SODIUM 0.07 MG/1
75 TABLET ORAL EVERY OTHER DAY
COMMUNITY
End: 2020-10-22 | Stop reason: SDUPTHER

## 2020-01-20 RX ORDER — AMMONIUM LACTATE 12 G/100G
1 CREAM TOPICAL AS NEEDED
COMMUNITY
Start: 2019-07-15 | End: 2021-05-10 | Stop reason: ALTCHOICE

## 2020-01-20 RX ORDER — TACROLIMUS 1 MG/G
1 OINTMENT TOPICAL AS NEEDED
COMMUNITY
Start: 2019-07-22 | End: 2022-02-21

## 2020-01-20 RX ORDER — MULTIVIT WITH MINERALS/LUTEIN
1000 TABLET ORAL DAILY
COMMUNITY

## 2020-01-20 RX ORDER — LEVOTHYROXINE SODIUM 88 MCG
88 TABLET ORAL EVERY OTHER DAY
COMMUNITY
Start: 2019-10-21 | End: 2020-10-22 | Stop reason: SDUPTHER

## 2020-01-20 RX ORDER — TOPIRAMATE 50 MG/1
50 TABLET, FILM COATED ORAL DAILY
COMMUNITY
End: 2020-03-23

## 2020-01-20 NOTE — PROGRESS NOTES
Chief Complaint   Patient presents with   • Gynecologic Exam     recurrent UTI's with hematuria.  patient has been seeing Dr. Montaño (records in media) and she has been told that her chemo may have caused inflammation of her bladder.         Ivis Mcginnis is a 49 y.o. year old  presenting to be seen for her annual exam.  This patient has a gynecologic history of a hysteroscopy D&C polypectomy; a laparoscopy with CO2 laser ablation of endometriosis; and a laparoscopically assisted vaginal hysterectomy/vaginal cuff suspension to uterosacral ligaments.  She currently takes Premarin, 0.625 mg daily for relief of menopausal symptoms.  She has no side effects on this medication.  She has a history of acute myelogenous leukemia and has been on chemotherapy.  She is currently in remission.  She has developed recurrent hematuria which Dr. Javier Montaño Jr. feels is secondary to her chemotherapy.  She had the onset of hematuria over the weekend and initiated Pyridium.  She has a history of mild osteopenia of the right hip and femoral neck.  She has had no atraumatic fractures.    SCREENING TESTS    Year 2012 202 2023 [id number] 2033   Age                         PAP                         HPV high risk                         Mammogram       benign benign                 NILO score                         Breast MRI                         Lipids                         Vitamin D                         Colonoscopy                         DEXA  Frax (hip/any)      osteopenia                   Ovarian Screen                             She exercises regularly: yes.  She wears her seat belt: yes.  She has concerns about domestic violence: no.  She has noticed changes in height: no    GYN screening history:  · Last mammogram: was done on approximately 2019 and the result was: Birads I (Normal).  · Last DEXA: was done on  approximately 6/28/2017 and the results were: Mild osteopenia of the right hip and right femoral neck.    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  She does not have any pertinent problems on file.  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  Her family history includes Coronary artery disease in her father; Hypertension in her father; Kidney disease in her mother.  She  reports that she has never smoked. She has never used smokeless tobacco. She reports that she does not drink alcohol or use drugs.  Current Outpatient Medications   Medication Sig Dispense Refill   • ammonium lactate (AMLACTIN) 12 % cream Apply 1 application topically to the appropriate area as directed As Needed.     • Multiple Vitamins-Minerals (MULTIVITAMIN ADULT PO) Take 1 tablet by mouth Daily.     • tacrolimus (PROTOPIC) 0.1 % ointment Apply 1 application topically to the appropriate area as directed As Needed.     • ascorbic acid (VITAMIN C) 1000 MG tablet Take 1,000 mg by mouth Daily.     • aspirin 81 MG chewable tablet Chew 81 mg Every 3 (Three) Days.     • Biotin 36493 MCG tablet Take 1 tablet by mouth Daily.     • Calcium Carb-Cholecalciferol (CALCIUM 600+D3 PO) Take 1 tablet by mouth Daily.     • Cholecalciferol (VITAMIN D) 2000 UNITS capsule Take  by mouth.     • estrogens, conjugated, (PREMARIN) 0.625 MG tablet Take 1 tablet by mouth Daily. Take daily for 21 days then do not take for 7 days. 90 tablet 3   • levothyroxine  "(SYNTHROID, LEVOTHROID) 75 MCG tablet Take 75 mcg by mouth Every Other Day.     • loratadine-pseudoephedrine (CLARITIN-D 24-hour)  MG per 24 hr tablet TAKE 1 TABLET BY MOUTH ONCE DAILY 90 tablet 0   • phenazopyridine (PYRIDIUM) 100 MG tablet Take 1 tablet by mouth 3 (Three) Times a Day As Needed for bladder spasms. 30 tablet 0   • SYNTHROID 88 MCG tablet Take 88 mcg by mouth Every Other Day.     • topiramate (TOPAMAX) 50 MG tablet Take 50 mg by mouth Daily.     • triamcinolone (KENALOG) 0.1 % cream Apply 1 application topically to the appropriate area as directed As Needed.     • Triamcinolone Acetonide (KENALOG IJ) Inject 1 dose as directed.     • valACYclovir (VALTREX) 1000 MG tablet TAKE 1 TABLET BY MOUTH ONCE DAILY 90 tablet 1   • VAQTA 50 UNIT/ML injection        No current facility-administered medications for this visit.      She is allergic to dezocine; pentamidine; and piperacillin sod-tazobactam so..    Review of Systems  A comprehensive review of systems was taken.  Constitutional: negative for fever, chills, activity change, appetite change, fatigue and unexpected weight change.  Respiratory: negative  Cardiovascular: negative  Gastrointestinal: negative  Genitourinary:positive for hematuria  Musculoskeletal:negative  Behavioral/Psych: negative       /70   Ht 165.1 cm (65\")   Wt 59.6 kg (131 lb 6.4 oz)   Breastfeeding No   BMI 21.87 kg/m²     Physical Exam    General:  alert; cooperative; well developed; well nourished   Skin:  No suspicious lesions seen   Thyroid: normal to inspection and palpation   Lungs:  clear to auscultation bilaterally   Heart:  regular rate and rhythm, S1, S2 normal, no murmur, click, rub or gallop   Breasts:  Examined in supine position  Symmetric without masses or skin dimpling  Nipples normal without inversion, lesions or discharge  There are no palpable axillary nodes  Fibrocystic changes are present both breasts without a discrete mass   Abdomen: soft, " non-tender; no masses  no umbilical or inguinal hernias are present  no hepato-splenomegaly   Pelvis: Clinical staff was present for exam  External genitalia:  normal appearance of the external genitalia including Bartholin's and [place]'s glands.  Vaginal:  normal pink mucosa without prolapse or lesions. the urethro-vesical angle is good  Cervix:  absent.  Uterus:  absent.  Adnexa:  non palpable bilaterally.  Rectal:  anus visually normal appearing. recto-vaginal exam unremarkable and confirms findings;     Lab Review   CBC results    Imaging  Mammogram results and DEXA         ASSESSMENT  Problems Addressed this Visit        Genitourinary    Post-menopause on HRT (hormone replacement therapy) - Primary    Relevant Medications    estrogens, conjugated, (PREMARIN) 0.625 MG tablet       Other    Fibrocystic breast changes, bilateral      Other Visit Diagnoses     Encounter for gynecological examination without abnormal finding                  Substance History:   reports that she has never smoked. She has never used smokeless tobacco.   reports that she does not drink alcohol.   reports that she does not use drugs.    Substance use counseling is not indicated based on patient history.      PLAN    Medications prescribed this encounter:    New Medications Ordered This Visit   Medications   • estrogens, conjugated, (PREMARIN) 0.625 MG tablet     Sig: Take 1 tablet by mouth Daily. Take daily for 21 days then do not take for 7 days.     Dispense:  90 tablet     Refill:  3   · Monthly self breast assessment and annual breast imaging  · Calcium, 600 mg/ Vit. D, 400 IU daily; regular weight-bearing exercise  · Follow up: 3 day(s) for Cath U/A  *Please note that portions of this documentation may have been completed with a voice recognition program.  Efforts were made to edit this dictation, but occasional words may have been mistranscribed.       This note was electronically signed.    DOROTHEA Avila MD  January 20,  2020  2:03 PM

## 2020-01-23 ENCOUNTER — LAB (OUTPATIENT)
Dept: LAB | Facility: HOSPITAL | Age: 50
End: 2020-01-23

## 2020-01-23 ENCOUNTER — OFFICE VISIT (OUTPATIENT)
Dept: OBSTETRICS AND GYNECOLOGY | Facility: CLINIC | Age: 50
End: 2020-01-23

## 2020-01-23 VITALS
BODY MASS INDEX: 21.83 KG/M2 | SYSTOLIC BLOOD PRESSURE: 120 MMHG | DIASTOLIC BLOOD PRESSURE: 70 MMHG | WEIGHT: 131 LBS | HEIGHT: 65 IN

## 2020-01-23 DIAGNOSIS — N30.90 RECURRENT CYSTITIS: Primary | ICD-10-CM

## 2020-01-23 DIAGNOSIS — N30.90 RECURRENT CYSTITIS: ICD-10-CM

## 2020-01-23 PROCEDURE — 87086 URINE CULTURE/COLONY COUNT: CPT

## 2020-01-23 PROCEDURE — 99213 OFFICE O/P EST LOW 20 MIN: CPT | Performed by: OBSTETRICS & GYNECOLOGY

## 2020-01-23 RX ORDER — NITROFURANTOIN 25; 75 MG/1; MG/1
CAPSULE ORAL
Qty: 21 CAPSULE | Refills: 3 | Status: SHIPPED | OUTPATIENT
Start: 2020-01-23 | End: 2020-11-03

## 2020-01-23 RX ORDER — SULFAMETHOXAZOLE AND TRIMETHOPRIM 800; 160 MG/1; MG/1
1 TABLET ORAL 2 TIMES DAILY
Qty: 10 TABLET | Refills: 0 | Status: SHIPPED | OUTPATIENT
Start: 2020-01-23 | End: 2020-01-28

## 2020-01-23 NOTE — PROGRESS NOTES
Chief Complaint   Patient presents with   • Follow-up     cath urine       Ivis Mcginnis is a 49 y.o. year old  presenting to be seen because of complaints of urinary frequency and dysuria.  This patient had an E. coli UTI in 2019.  She now has a 24-hour history of frequency and dysuria.  She has previously had a LAVH.  She has a personal history of AML.    Social History     Substance and Sexual Activity   Sexual Activity Yes   • Partners: Male   • Birth control/protection: Surgical    Comment:      SCREENING TESTS    Year 2012   Age                         PAP                         HPV high risk                         Mammogram        benign benign                NILO score                         Breast MRI                         Lipids                         Vitamin D                         Colonoscopy                         DEXA  Frax (hip/any)      osteopenia                   Ovarian Screen                             No Additional Complaints Reported    The following portions of the patient's history were reviewed and updated as appropriate:vital signs and   She  has a past medical history of Fibrocystic breast changes, bilateral, 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  She does not have any pertinent problems on file.  She  has a past surgical history that includes Bone marrow transplant (2009); laparoscopic assisted vaginal hysterectomy; Portacath placement; Laser laparoscopy; and d&c hysteroscopy.  Her family history includes Coronary artery disease in her father; Hypertension in her father; Kidney disease in her mother.  Current Outpatient Medications   Medication Sig Dispense Refill   • ammonium lactate  (AMLACTIN) 12 % cream Apply 1 application topically to the appropriate area as directed As Needed.     • ascorbic acid (VITAMIN C) 1000 MG tablet Take 1,000 mg by mouth Daily.     • aspirin 81 MG chewable tablet Chew 81 mg Every 3 (Three) Days.     • Biotin 82570 MCG tablet Take 1 tablet by mouth Daily.     • Calcium Carb-Cholecalciferol (CALCIUM 600+D3 PO) Take 1 tablet by mouth Daily.     • Cholecalciferol (VITAMIN D) 2000 UNITS capsule Take  by mouth.     • estrogens, conjugated, (PREMARIN) 0.625 MG tablet Take 1 tablet by mouth Daily. Take daily for 21 days then do not take for 7 days. 90 tablet 3   • levothyroxine (SYNTHROID, LEVOTHROID) 75 MCG tablet Take 75 mcg by mouth Every Other Day.     • loratadine-pseudoephedrine (CLARITIN-D 24-hour)  MG per 24 hr tablet TAKE 1 TABLET BY MOUTH ONCE DAILY 90 tablet 0   • Multiple Vitamins-Minerals (MULTIVITAMIN ADULT PO) Take 1 tablet by mouth Daily.     • nitrofurantoin, macrocrystal-monohydrate, (MACROBID) 100 MG capsule Take one capsule with each act of intercourse 21 capsule 3   • phenazopyridine (PYRIDIUM) 100 MG tablet Take 1 tablet by mouth 3 (Three) Times a Day As Needed for bladder spasms. 30 tablet 0   • sulfamethoxazole-trimethoprim (BACTRIM DS) 800-160 MG per tablet Take 1 tablet by mouth 2 (Two) Times a Day for 5 days. 10 tablet 0   • SYNTHROID 88 MCG tablet Take 88 mcg by mouth Every Other Day.     • tacrolimus (PROTOPIC) 0.1 % ointment Apply 1 application topically to the appropriate area as directed As Needed.     • topiramate (TOPAMAX) 50 MG tablet Take 50 mg by mouth Daily.     • triamcinolone (KENALOG) 0.1 % cream Apply 1 application topically to the appropriate area as directed As Needed.     • Triamcinolone Acetonide (KENALOG IJ) Inject 1 dose as directed.     • valACYclovir (VALTREX) 1000 MG tablet TAKE 1 TABLET BY MOUTH ONCE DAILY 90 tablet 1   • VAQTA 50 UNIT/ML injection        No current facility-administered medications for this visit.   "    She is allergic to dezocine; pentamidine; and piperacillin sod-tazobactam so..        Review of Systems  A comprehensive review of systems was done.  Constitutional: negative for fever, chills, activity change, appetite change, fatigue and unexpected weight change.  Respiratory: negative  Cardiovascular: negative  Gastrointestinal: negative  Genitourinary:positive for dysuria and frequency  Musculoskeletal:negative  Behavioral/Psych: negative          /70   Ht 165.1 cm (65\")   Wt 59.4 kg (131 lb)   Breastfeeding No   BMI 21.80 kg/m²     Physical Exam    General:  alert; cooperative; well developed; well nourished   Skin:  Not performed.   Thyroid: normal to inspection and palpation   Lungs:  clear to auscultation bilaterally   Heart:  regular rate and rhythm, S1, S2 normal, no murmur, click, rub or gallop   Breasts:  Not performed.   Abdomen: soft, non-tender; no masses  no umbilical or inguinal hernias are present  no hepato-splenomegaly   Pelvis: Clinical staff was present for exam  External genitalia:  normal appearance of the external genitalia including Bartholin's and [place]'s glands.  Vaginal:  normal pink mucosa without prolapse or lesions.  Cervix:  absent.  Uterus:  absent.  Adnexa:  non palpable bilaterally.     Lab Review   Urine culture results    Imaging   Mammogram results- Birads II    Medical counseling was given to patient for the following topics: diagnostic results, instructions for management, risk factor reductions, impressions, risks and benefits of treatment options and importance of treatment compliance . Total time of the encounter was 16 minute(s) and 9 minute(s)  was spent in face to face counseling.  I have advised the patient that we need to obtain a catheterized urine specimen for culture.  Since this will not return for 48 hours I have counseled the patient that I will go ahead and treat her with Bactrim DS twice daily for 5 days.  I have counseled the patient that she " needs to empty her bladder after each active intercourse and to initiate her for her intoeing macrocrystals, 100 mg after each active intercourse.  I have advised her to increase her fluid intake.  Counseled the patient to contact me should her symptoms not resolve          ASSESSMENT  Problems Addressed this Visit     None      Visit Diagnoses     Recurrent cystitis    -  Primary    Relevant Medications    sulfamethoxazole-trimethoprim (BACTRIM DS) 800-160 MG per tablet    nitrofurantoin, macrocrystal-monohydrate, (MACROBID) 100 MG capsule    Other Relevant Orders    Urine Culture - Urine, Urine, Catheter In/Out           Substance History:    reports that she has never smoked. She has never used smokeless tobacco.    reports that she does not drink alcohol.    reports that she does not use drugs.    Substance use counseling is not indicated based on patient history.      PLAN    Medications prescribed this encounter:    New Medications Ordered This Visit   Medications   • sulfamethoxazole-trimethoprim (BACTRIM DS) 800-160 MG per tablet     Sig: Take 1 tablet by mouth 2 (Two) Times a Day for 5 days.     Dispense:  10 tablet     Refill:  0   • nitrofurantoin, macrocrystal-monohydrate, (MACROBID) 100 MG capsule     Sig: Take one capsule with each act of intercourse     Dispense:  21 capsule     Refill:  3   · Increase fluid intake  · Follow up: 12 month(s)  · Calcium, 600 mg/ Vit. D, 400 IU daily, regular, weight-bearing exercise 4 days a week     *Please note that portions of this documentation may have been completed with a voice recognition program.  Efforts were made to edit this dictation, but occasional words may have been mistranscribed.     This note was electronically signed.    DOROTHEA Avila MD  January 23, 2020  3:22 PM

## 2020-01-24 LAB — BACTERIA SPEC AEROBE CULT: NO GROWTH

## 2020-01-30 ENCOUNTER — TELEPHONE (OUTPATIENT)
Dept: OBSTETRICS AND GYNECOLOGY | Facility: CLINIC | Age: 50
End: 2020-01-30

## 2020-01-30 NOTE — TELEPHONE ENCOUNTER
"Patient called to confirm urine culture results.  She had been given a preliminary result of \"no growth\", and was told that we would call if there was a change in the final result.  The final result was \"no growth\".  The patient just wanted to be sure.  She voiced understanding of results.  "

## 2020-02-12 ENCOUNTER — TELEPHONE (OUTPATIENT)
Dept: FAMILY MEDICINE CLINIC | Facility: CLINIC | Age: 50
End: 2020-02-12

## 2020-02-12 RX ORDER — SCOLOPAMINE TRANSDERMAL SYSTEM 1 MG/1
1 PATCH, EXTENDED RELEASE TRANSDERMAL
Qty: 3 EACH | Refills: 1 | Status: SHIPPED | OUTPATIENT
Start: 2020-02-12 | End: 2020-11-03

## 2020-02-12 NOTE — TELEPHONE ENCOUNTER
Patient is going on a cruise for spring break, on march 27th, and is wondering if she could have The patch (doesn't know what it is called) in need of a patch for Nausea for the cruise and wondering if she could have it called into her pharmacy for the cruise

## 2020-02-26 ENCOUNTER — OFFICE VISIT (OUTPATIENT)
Dept: FAMILY MEDICINE CLINIC | Facility: CLINIC | Age: 50
End: 2020-02-26

## 2020-02-26 VITALS
HEIGHT: 65 IN | DIASTOLIC BLOOD PRESSURE: 70 MMHG | RESPIRATION RATE: 16 BRPM | TEMPERATURE: 97.4 F | BODY MASS INDEX: 21.66 KG/M2 | HEART RATE: 80 BPM | WEIGHT: 130 LBS | SYSTOLIC BLOOD PRESSURE: 120 MMHG

## 2020-02-26 DIAGNOSIS — R68.83 CHILLS: ICD-10-CM

## 2020-02-26 DIAGNOSIS — J06.9 VIRAL URI: Primary | ICD-10-CM

## 2020-02-26 DIAGNOSIS — J02.9 SORE THROAT: ICD-10-CM

## 2020-02-26 LAB
EXPIRATION DATE: NORMAL
EXPIRATION DATE: NORMAL
FLUAV AG NPH QL: NEGATIVE
FLUBV AG NPH QL: NEGATIVE
INTERNAL CONTROL: NORMAL
INTERNAL CONTROL: NORMAL
Lab: NORMAL
Lab: NORMAL
S PYO AG THROAT QL: NEGATIVE

## 2020-02-26 PROCEDURE — 87880 STREP A ASSAY W/OPTIC: CPT | Performed by: NURSE PRACTITIONER

## 2020-02-26 PROCEDURE — 87804 INFLUENZA ASSAY W/OPTIC: CPT | Performed by: NURSE PRACTITIONER

## 2020-02-26 PROCEDURE — 99213 OFFICE O/P EST LOW 20 MIN: CPT | Performed by: NURSE PRACTITIONER

## 2020-02-26 NOTE — PATIENT INSTRUCTIONS
Sore Throat  A sore throat is pain, burning, irritation, or scratchiness in the throat. When you have a sore throat, you may feel pain or tenderness in your throat when you swallow or talk.  Many things can cause a sore throat, including:  · An infection.  · Seasonal allergies.  · Dryness in the air.  · Irritants, such as smoke or pollution.  · Radiation treatment to the area.  · Gastroesophageal reflux disease (GERD).  · A tumor.  A sore throat is often the first sign of another sickness. It may happen with other symptoms, such as coughing, sneezing, fever, and swollen neck glands. Most sore throats go away without medical treatment.  Follow these instructions at home:         · Take over-the-counter medicines only as told by your health care provider.  ? If your child has a sore throat, do not give your child aspirin because of the association with Reye syndrome.  · Drink enough fluids to keep your urine pale yellow.  · Rest as needed.  · To help with pain, try:  ? Sipping warm liquids, such as broth, herbal tea, or warm water.  ? Eating or drinking cold or frozen liquids, such as frozen ice pops.  ? Gargling with a salt-water mixture 3-4 times a day or as needed. To make a salt-water mixture, completely dissolve ½-1 tsp (3-6 g) of salt in 1 cup (237 mL) of warm water.  ? Sucking on hard candy or throat lozenges.  ? Putting a cool-mist humidifier in your bedroom at night to moisten the air.  ? Sitting in the bathroom with the door closed for 5-10 minutes while you run hot water in the shower.  · Do not use any products that contain nicotine or tobacco, such as cigarettes, e-cigarettes, and chewing tobacco. If you need help quitting, ask your health care provider.  · Wash your hands well and often with soap and water. If soap and water are not available, use hand .  Contact a health care provider if:  · You have a fever for more than 2-3 days.  · You have symptoms that last (are persistent) for more than  2-3 days.  · Your throat does not get better within 7 days.  · You have a fever and your symptoms suddenly get worse.  · Your child who is 3 months to 3 years old has a temperature of 102.2°F (39°C) or higher.  Get help right away if:  · You have difficulty breathing.  · You cannot swallow fluids, soft foods, or your saliva.  · You have increased swelling in your throat or neck.  · You have persistent nausea and vomiting.  Summary  · A sore throat is pain, burning, irritation, or scratchiness in the throat. Many things can cause a sore throat.  · Take over-the-counter medicines only as told by your health care provider. Do not give your child aspirin.  · Drink plenty of fluids, and rest as needed.  · Contact a health care provider if your symptoms worsen or your sore throat does not get better within 7 days.  This information is not intended to replace advice given to you by your health care provider. Make sure you discuss any questions you have with your health care provider.  Document Released: 01/25/2006 Document Revised: 05/20/2019 Document Reviewed: 05/20/2019  [employer] Interactive Patient Education © 2020 [employer]

## 2020-02-26 NOTE — PROGRESS NOTES
Farhan Mcginnis is a 49 y.o. female.     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    Review of Systems   Constitutional: Positive for chills. Negative for activity change, appetite change, fatigue and fever.   HENT: Positive for postnasal drip, rhinorrhea and sore throat. Negative for congestion, ear pain, sinus pressure, sneezing, swollen glands, trouble swallowing and voice change.    Eyes: Negative for redness and itching.   Respiratory: Positive for cough. Negative for chest tightness, shortness of breath and wheezing.    Cardiovascular: Negative.  Negative for chest pain.   Gastrointestinal: Negative.  Negative for abdominal pain, nausea and vomiting.   Endocrine: Negative.    Genitourinary: Negative.    Musculoskeletal: Negative.  Negative for arthralgias, myalgias, neck pain and neck stiffness.   Skin: Negative.  Negative for rash.   Allergic/Immunologic: Negative.  Negative for environmental allergies.   Neurological: Negative for dizziness, weakness, light-headedness and headache.   Hematological: Negative for adenopathy.   Psychiatric/Behavioral: Negative.  Negative for sleep disturbance.       Objective   Physical Exam   Constitutional: She is oriented to person, place, and time. She appears well-developed and well-nourished. She has a sickly appearance. No distress.   HENT:   Head: Normocephalic and atraumatic.   Right Ear: Tympanic membrane, external ear and ear canal normal. Tympanic membrane is not erythematous.   Left Ear: Tympanic membrane, external ear and ear canal  normal. Tympanic membrane is not erythematous.   Nose: Mucosal edema and rhinorrhea present. Right sinus exhibits no maxillary sinus tenderness and no frontal sinus tenderness. Left sinus exhibits no maxillary sinus tenderness and no frontal sinus tenderness.   Mouth/Throat: Uvula is midline, oropharynx is clear and moist and mucous membranes are normal. No posterior oropharyngeal edema or posterior oropharyngeal erythema.   Eyes: Lids are normal.   Neck: Neck supple.   Cardiovascular: Normal rate, regular rhythm and normal heart sounds.   Pulmonary/Chest: Effort normal and breath sounds normal.   Abdominal: Normal appearance. There is no tenderness.   Lymphadenopathy:        Head (right side): No tonsillar, no preauricular, no posterior auricular and no occipital adenopathy present.        Head (left side): No tonsillar, no preauricular, no posterior auricular and no occipital adenopathy present.     She has no cervical adenopathy.   Neurological: She is alert and oriented to person, place, and time.   Skin: Skin is warm, dry and intact. Capillary refill takes less than 2 seconds. No rash noted. She is not diaphoretic. No cyanosis. No pallor.   Psychiatric: She has a normal mood and affect. Her behavior is normal. Judgment and thought content normal. Cognition and memory are normal.   Nursing note and vitals reviewed.        Assessment/Plan   Ivis was seen today for sore throat, hoarseness, chills, cough, fatigue.    Diagnoses and all orders for this visit:    Viral URI    Sore throat  -     POCT rapid strep A    Chills  -     POCT Influenza A/B      Strep A and Flu A/B negative pt informed  Viral uri continue OTC meds and warm saline gargles  F/U if not improving, pt agrees.

## 2020-03-04 NOTE — TELEPHONE ENCOUNTER
Pt called in requesting med refill loratadine-pseudoephedrine (CLARITIN-D 24-hour)  MG per 24 hr tablet

## 2020-03-09 RX ORDER — VALACYCLOVIR HYDROCHLORIDE 1 G/1
TABLET, FILM COATED ORAL
Qty: 90 TABLET | Refills: 1 | Status: SHIPPED | OUTPATIENT
Start: 2020-03-09 | End: 2020-09-21

## 2020-03-23 RX ORDER — TOPIRAMATE 50 MG/1
TABLET, FILM COATED ORAL
Qty: 180 TABLET | Refills: 0 | Status: SHIPPED | OUTPATIENT
Start: 2020-03-23 | End: 2020-10-05

## 2020-09-21 RX ORDER — VALACYCLOVIR HYDROCHLORIDE 1 G/1
TABLET, FILM COATED ORAL
Qty: 90 TABLET | Refills: 0 | Status: SHIPPED | OUTPATIENT
Start: 2020-09-21 | End: 2020-12-28

## 2020-10-03 ENCOUNTER — TELEPHONE (OUTPATIENT)
Dept: FAMILY MEDICINE CLINIC | Facility: CLINIC | Age: 50
End: 2020-10-03

## 2020-10-05 RX ORDER — TOPIRAMATE 50 MG/1
TABLET, FILM COATED ORAL
Qty: 60 TABLET | Refills: 0 | Status: SHIPPED | OUTPATIENT
Start: 2020-10-05 | End: 2020-12-07

## 2020-10-05 NOTE — TELEPHONE ENCOUNTER
should be a standing order, if not seen in 6 months then pt needs appointment for refills.  Pt needs appointment  30 day supply sent in but not seen since 8/2019 for follow up

## 2020-10-22 ENCOUNTER — OFFICE VISIT (OUTPATIENT)
Dept: ENDOCRINOLOGY | Facility: CLINIC | Age: 50
End: 2020-10-22

## 2020-10-22 VITALS
WEIGHT: 137.2 LBS | OXYGEN SATURATION: 99 % | HEIGHT: 65 IN | SYSTOLIC BLOOD PRESSURE: 118 MMHG | TEMPERATURE: 98.4 F | HEART RATE: 62 BPM | BODY MASS INDEX: 22.86 KG/M2 | DIASTOLIC BLOOD PRESSURE: 72 MMHG

## 2020-10-22 DIAGNOSIS — E03.9 HYPOTHYROIDISM, UNSPECIFIED TYPE: Primary | ICD-10-CM

## 2020-10-22 PROCEDURE — 99212 OFFICE O/P EST SF 10 MIN: CPT | Performed by: INTERNAL MEDICINE

## 2020-10-22 RX ORDER — CYCLOSPORINE 0.5 MG/ML
EMULSION OPHTHALMIC
COMMUNITY
Start: 2020-07-29 | End: 2023-03-15

## 2020-10-22 RX ORDER — LEVOTHYROXINE SODIUM 0.07 MG/1
75 TABLET ORAL EVERY OTHER DAY
Qty: 45 TABLET | Refills: 3 | Status: SHIPPED | OUTPATIENT
Start: 2020-10-22 | End: 2021-01-20

## 2020-10-22 RX ORDER — LEVOTHYROXINE SODIUM 88 MCG
88 TABLET ORAL EVERY OTHER DAY
Qty: 45 TABLET | Refills: 3 | Status: SHIPPED | OUTPATIENT
Start: 2020-10-22 | End: 2021-01-20

## 2020-10-22 NOTE — PROGRESS NOTES
"     Office Note      Date: 10/22/2020  Patient Name: Ivis Mcginnis  MRN: [id number]  : 1970    Chief Complaint   Patient presents with   • Follow-up   • Hypothyroidism       History of Present Illness:   Ivis Mcginnis is a 50 y.o. female who presents for Follow-up and Hypothyroidism    She remains on synthroid alternating doses of 75 and 88mcg qod. She is taking this correctly. She isn't taking any interfering meds concurrently. She hasn't noted any change in the size of her neck. She denies any compressive sxs. She denies any sxs of hypo- or hyperthyroidism at this time, aside from fatigue but this is better.    Subjective      Review of Systems:   Review of Systems   Constitutional: Negative.    Cardiovascular: Negative.    Gastrointestinal: Positive for constipation.   Endocrine: Negative.        The following portions of the patient's history were reviewed and updated as appropriate: allergies, current medications, past family history, past medical history, past social history, past surgical history and problem list.    Objective     Visit Vitals  /72 (BP Location: Left arm, Patient Position: Sitting, Cuff Size: Adult)   Pulse 62   Temp 98.4 °F (36.9 °C) (Infrared)   Ht 165.1 cm (65\")   Wt 62.2 kg (137 lb 3.2 oz)   SpO2 99%   BMI 22.83 kg/m²       Physical Exam:  Physical Exam  Constitutional:       Appearance: Normal appearance.   Neck:      Thyroid: No thyroid mass, thyromegaly or thyroid tenderness.   Neurological:      Mental Status: She is alert.         Labs:    TSH  No results found for: TSHBASE     Free T4  Free T4   Date Value Ref Range Status   2017 1.48 0.89 - 1.76 ng/dL Final       T3  No results found for: K3DQUPV      TPO  No results found for: THYROIDAB    TG AB  No results found for: THGAB    TG  No results found for: THYROGLB    CBC w/DIFF  Lab Results   Component Value Date    WBC 6.16 10/01/2018    RBC 3.85 (L) 10/01/2018    HGB 14.5 10/01/2018    HCT 41.5 10/01/2018    MCV " 107.8 (H) 10/01/2018    MCH 37.7 (H) 10/01/2018    MCHC 34.9 10/01/2018    RDW 12.3 10/01/2018     10/01/2018    NEUTRORELPCT 42.4 10/01/2018    LYMPHORELPCT 47.2 (H) 10/01/2018    MONORELPCT 6.5 10/01/2018    EOSRELPCT 3.2 (H) 10/01/2018    BASORELPCT 0.5 10/01/2018    NEUTROABS 2.61 10/01/2018    LYMPHSABS 2.91 10/01/2018    MONOSABS 0.40 10/01/2018    EOSABS 0.20 10/01/2018    BASOSABS 0.03 10/01/2018           Assessment / Plan      Assessment & Plan:  Problem List Items Addressed This Visit        Endocrine    Hypothyroidism - Primary    Current Assessment & Plan     Symptomatically okay.  Provided lab slip.  She has physical with her PCP in 2 weeks and will get TSH done then.         Relevant Medications    Triamcinolone Acetonide (KENALOG IJ)    levothyroxine (SYNTHROID, LEVOTHROID) 75 MCG tablet    Synthroid 88 MCG tablet    Other Relevant Orders    TSH           Return in about 6 months (around 4/22/2021) for Recheck with TSH.    Trey Higuera MD   10/22/2020

## 2020-10-22 NOTE — ASSESSMENT & PLAN NOTE
Symptomatically okay.  Provided lab slip.  She has physical with her PCP in 2 weeks and will get TSH done then.

## 2020-11-03 ENCOUNTER — OFFICE VISIT (OUTPATIENT)
Dept: FAMILY MEDICINE CLINIC | Facility: CLINIC | Age: 50
End: 2020-11-03

## 2020-11-03 ENCOUNTER — RESULTS ENCOUNTER (OUTPATIENT)
Dept: FAMILY MEDICINE CLINIC | Facility: CLINIC | Age: 50
End: 2020-11-03

## 2020-11-03 VITALS
RESPIRATION RATE: 18 BRPM | HEART RATE: 74 BPM | DIASTOLIC BLOOD PRESSURE: 82 MMHG | TEMPERATURE: 97.1 F | WEIGHT: 139 LBS | BODY MASS INDEX: 23.16 KG/M2 | HEIGHT: 65 IN | SYSTOLIC BLOOD PRESSURE: 118 MMHG

## 2020-11-03 DIAGNOSIS — Z00.00 WELL WOMAN EXAM (NO GYNECOLOGICAL EXAM): ICD-10-CM

## 2020-11-03 DIAGNOSIS — E55.9 VITAMIN D DEFICIENCY: ICD-10-CM

## 2020-11-03 DIAGNOSIS — Z12.11 SCREEN FOR COLON CANCER: ICD-10-CM

## 2020-11-03 DIAGNOSIS — Z00.00 WELL WOMAN EXAM (NO GYNECOLOGICAL EXAM): Primary | ICD-10-CM

## 2020-11-03 DIAGNOSIS — E78.00 ELEVATED CHOLESTEROL: ICD-10-CM

## 2020-11-03 DIAGNOSIS — E03.9 ACQUIRED HYPOTHYROIDISM: ICD-10-CM

## 2020-11-03 DIAGNOSIS — K21.9 GASTROESOPHAGEAL REFLUX DISEASE WITHOUT ESOPHAGITIS: ICD-10-CM

## 2020-11-03 LAB
25(OH)D3+25(OH)D2 SERPL-MCNC: 54.1 NG/ML (ref 30–100)
ALBUMIN SERPL-MCNC: 4.4 G/DL (ref 3.5–5.2)
ALBUMIN/GLOB SERPL: 2.3 G/DL
ALP SERPL-CCNC: 47 U/L (ref 39–117)
ALT SERPL-CCNC: 19 U/L (ref 1–33)
AST SERPL-CCNC: 20 U/L (ref 1–32)
BASOPHILS # BLD AUTO: 0.03 10*3/MM3 (ref 0–0.2)
BASOPHILS NFR BLD AUTO: 0.6 % (ref 0–1.5)
BILIRUB SERPL-MCNC: 0.3 MG/DL (ref 0–1.2)
BUN SERPL-MCNC: 16 MG/DL (ref 6–20)
BUN/CREAT SERPL: 18.4 (ref 7–25)
CALCIUM SERPL-MCNC: 9.5 MG/DL (ref 8.6–10.5)
CHLORIDE SERPL-SCNC: 107 MMOL/L (ref 98–107)
CHOLEST SERPL-MCNC: 215 MG/DL (ref 0–200)
CO2 SERPL-SCNC: 27.3 MMOL/L (ref 22–29)
CREAT SERPL-MCNC: 0.87 MG/DL (ref 0.57–1)
EOSINOPHIL # BLD AUTO: 0.15 10*3/MM3 (ref 0–0.4)
EOSINOPHIL NFR BLD AUTO: 3.2 % (ref 0.3–6.2)
ERYTHROCYTE [DISTWIDTH] IN BLOOD BY AUTOMATED COUNT: 11.5 % (ref 12.3–15.4)
GLOBULIN SER CALC-MCNC: 1.9 GM/DL
GLUCOSE SERPL-MCNC: 82 MG/DL (ref 65–99)
HCT VFR BLD AUTO: 37.4 % (ref 34–46.6)
HDLC SERPL-MCNC: 102 MG/DL (ref 40–60)
HGB BLD-MCNC: 13.3 G/DL (ref 12–15.9)
IMM GRANULOCYTES # BLD AUTO: 0.01 10*3/MM3 (ref 0–0.05)
IMM GRANULOCYTES NFR BLD AUTO: 0.2 % (ref 0–0.5)
LDLC SERPL CALC-MCNC: 93 MG/DL (ref 0–100)
LYMPHOCYTES # BLD AUTO: 2.02 10*3/MM3 (ref 0.7–3.1)
LYMPHOCYTES NFR BLD AUTO: 43.5 % (ref 19.6–45.3)
MCH RBC QN AUTO: 37 PG (ref 26.6–33)
MCHC RBC AUTO-ENTMCNC: 35.6 G/DL (ref 31.5–35.7)
MCV RBC AUTO: 104.2 FL (ref 79–97)
MONOCYTES # BLD AUTO: 0.42 10*3/MM3 (ref 0.1–0.9)
MONOCYTES NFR BLD AUTO: 9.1 % (ref 5–12)
NEUTROPHILS # BLD AUTO: 2.01 10*3/MM3 (ref 1.7–7)
NEUTROPHILS NFR BLD AUTO: 43.4 % (ref 42.7–76)
NRBC BLD AUTO-RTO: 0 /100 WBC (ref 0–0.2)
PLATELET # BLD AUTO: 190 10*3/MM3 (ref 140–450)
POTASSIUM SERPL-SCNC: 4.6 MMOL/L (ref 3.5–5.2)
PROT SERPL-MCNC: 6.3 G/DL (ref 6–8.5)
RBC # BLD AUTO: 3.59 10*6/MM3 (ref 3.77–5.28)
SODIUM SERPL-SCNC: 141 MMOL/L (ref 136–145)
T4 FREE SERPL-MCNC: 1.46 NG/DL (ref 0.93–1.7)
TRIGL SERPL-MCNC: 122 MG/DL (ref 0–150)
TSH SERPL DL<=0.005 MIU/L-ACNC: 0.17 UIU/ML (ref 0.27–4.2)
VLDLC SERPL CALC-MCNC: 20 MG/DL (ref 5–40)
WBC # BLD AUTO: 4.64 10*3/MM3 (ref 3.4–10.8)

## 2020-11-03 PROCEDURE — 99396 PREV VISIT EST AGE 40-64: CPT | Performed by: FAMILY MEDICINE

## 2020-11-03 NOTE — PROGRESS NOTES
"Subjective     Chief Complaint   Patient presents with   • Annual Exam   • Rommel Mcginnis is a 50 y.o. female who presents for an annual exam. The patient has no complaints today. The patient is sexually active. GYN screening history: last pap: approximate date 2020 and was normal. The patient wears seatbelts: no. The patient participates in regular exercise: yes.  The patient reports that there is not domestic violence in her life.     She has had burning in her chest and is getting more regular  This is in the AM as well as various times  Has tried tums with good control but not that often    History of abnormal Pap smear: no  Family history of uterine or ovarian cancer: no  Family history of colon cancer: yes maternal GM  History of abnormal mammogram: no  Family history of breast cancer: no  Colonoscopy history:   Never had    Menstrual History:  OB History        4    Para   3    Term   3            AB   1    Living   3       SAB   1    TAB        Ectopic        Molar        Multiple        Live Births                   No LMP recorded. Patient has had a hysterectomy.         The following portions of the patient's history were reviewed and updated as appropriate:vital signs, allergies, current medications, past family history, past medical history, past social history, past surgical history and problem list    Review of Systems   A comprehensive review of systems was negative.   No complaints    Objective     /82   Pulse 74   Temp 97.1 °F (36.2 °C)   Resp 18   Ht 165.1 cm (65\")   Wt 63 kg (139 lb)   BMI 23.13 kg/m²       Physical Exam  Vitals signs and nursing note reviewed.   Constitutional:       General: She is not in acute distress.     Appearance: Normal appearance. She is well-developed.   HENT:      Head: Normocephalic and atraumatic.      Right Ear: Tympanic membrane, ear canal and external ear normal.      Left Ear: Tympanic membrane, ear canal and external " ear normal.      Nose: Nose normal.   Eyes:      Extraocular Movements: Extraocular movements intact.      Conjunctiva/sclera: Conjunctivae normal.   Neck:      Musculoskeletal: Normal range of motion and neck supple.      Thyroid: No thyromegaly.   Cardiovascular:      Rate and Rhythm: Normal rate and regular rhythm.      Heart sounds: Normal heart sounds. No murmur.   Pulmonary:      Effort: Pulmonary effort is normal. No respiratory distress.      Breath sounds: Normal breath sounds.   Abdominal:      General: Bowel sounds are normal. There is no distension.      Palpations: Abdomen is soft.      Tenderness: There is no abdominal tenderness.   Lymphadenopathy:      Cervical: No cervical adenopathy.   Skin:     General: Skin is warm and dry.   Neurological:      Mental Status: She is alert and oriented to person, place, and time.   Psychiatric:         Mood and Affect: Mood normal.         Behavior: Behavior normal.         Thought Content: Thought content normal.         Judgment: Judgment normal.           Assessment/Plan     ASSESSMENT  Healthy female exam.    1. Well woman exam (no gynecological exam)    2. Gastroesophageal reflux disease without esophagitis    3. Acquired hypothyroidism    4. Vitamin D deficiency    5. Screen for colon cancer    6. Elevated cholesterol         PLAN  1.   Orders Placed This Encounter   Procedures   • Comprehensive Metabolic Panel   • Lipid Panel   • TSH+Free T4   • Cologuard - Stool, Per Rectum   • Vitamin D 25 Hydroxy   • CBC & Differential       2. Medications prescribed this encounter:    No orders of the defined types were placed in this encounter.      3. Counseled pt about well adult care including diet and exercise.  cologuard ordered.    Will recheck thyroid, cholesterol, and vit D.    Discussed shingrix but ACIP (Advisory Committee on Immunization Practices) has not approved for immunocompromised status at this time.    With her thyroid she alternates 75 and  88    Sajan Odom MD  11/3/2020

## 2020-11-04 ENCOUNTER — TELEPHONE (OUTPATIENT)
Dept: FAMILY MEDICINE CLINIC | Facility: CLINIC | Age: 50
End: 2020-11-04

## 2020-11-04 DIAGNOSIS — Z12.11 SCREEN FOR COLON CANCER: Primary | ICD-10-CM

## 2020-11-04 NOTE — TELEPHONE ENCOUNTER
I advised pt that company will call to confirm ins and shipping info once they do she can advise them she wants colonoscopy and cancel order

## 2020-11-04 NOTE — TELEPHONE ENCOUNTER
PT CALLED AND STATED THAT SHE WOULD LIKE TO CANCEL THE COLOGUARD, PT WOULD RATHER WAIT TO DO COLONOSCOPY AT A LATER DATE.    PLEASE ADVISE.  CALL BACK:[phone]

## 2020-11-06 ENCOUNTER — TELEPHONE (OUTPATIENT)
Dept: FAMILY MEDICINE CLINIC | Facility: CLINIC | Age: 50
End: 2020-11-06

## 2020-11-06 ENCOUNTER — TELEPHONE (OUTPATIENT)
Dept: ENDOCRINOLOGY | Facility: CLINIC | Age: 50
End: 2020-11-06

## 2020-11-06 NOTE — TELEPHONE ENCOUNTER
Caller: Ivis Mcginnis    Relationship to patient: Self    Best call back number: [phone]    Patient is needing: patient was returning call to April

## 2020-11-16 ENCOUNTER — TELEPHONE (OUTPATIENT)
Dept: ENDOCRINOLOGY | Facility: CLINIC | Age: 50
End: 2020-11-16

## 2020-12-04 NOTE — TELEPHONE ENCOUNTER
Caller: RasIvis    Relationship: Self    Best call back number: [phone]    Medication needed:   Requested Prescriptions     Pending Prescriptions Disp Refills   • loratadine-pseudoephedrine (CLARITIN-D 24-hour)  MG per 24 hr tablet 90 tablet 0     Sig: Take 1 tablet by mouth Daily.       When do you need the refill by: 12/4/20    What details did the patient provide when requesting the medication:     Does the patient have less than a 3 day supply:  [x] Yes  [] No    What is the patient's preferred pharmacy: [facility] [address] [place] [address] 7 AT [facility] [phone] [facility] [phone] FX

## 2020-12-07 RX ORDER — TOPIRAMATE 50 MG/1
TABLET, FILM COATED ORAL
Qty: 60 TABLET | Refills: 5 | Status: SHIPPED | OUTPATIENT
Start: 2020-12-07 | End: 2021-12-22

## 2020-12-28 RX ORDER — VALACYCLOVIR HYDROCHLORIDE 1 G/1
TABLET, FILM COATED ORAL
Qty: 90 TABLET | Refills: 2 | Status: SHIPPED | OUTPATIENT
Start: 2020-12-28 | End: 2021-09-27

## 2021-01-11 ENCOUNTER — TELEPHONE (OUTPATIENT)
Dept: ENDOCRINOLOGY | Facility: CLINIC | Age: 51
End: 2021-01-11

## 2021-01-20 ENCOUNTER — OFFICE VISIT (OUTPATIENT)
Dept: ENDOCRINOLOGY | Facility: CLINIC | Age: 51
End: 2021-01-20

## 2021-01-20 VITALS
BODY MASS INDEX: 23.32 KG/M2 | HEART RATE: 77 BPM | HEIGHT: 65 IN | TEMPERATURE: 97.1 F | DIASTOLIC BLOOD PRESSURE: 74 MMHG | SYSTOLIC BLOOD PRESSURE: 118 MMHG | OXYGEN SATURATION: 99 % | WEIGHT: 140 LBS

## 2021-01-20 DIAGNOSIS — E03.9 ACQUIRED HYPOTHYROIDISM: Primary | Chronic | ICD-10-CM

## 2021-01-20 PROCEDURE — 99213 OFFICE O/P EST LOW 20 MIN: CPT | Performed by: PHYSICIAN ASSISTANT

## 2021-01-20 RX ORDER — LEVOTHYROXINE SODIUM 0.07 MG/1
75 TABLET ORAL DAILY
Start: 2021-01-20 | End: 2021-01-27 | Stop reason: SDUPTHER

## 2021-01-20 NOTE — PROGRESS NOTES
"     Office Note      Date: 2021  Patient Name: Ivis Mcginnis  MRN: [id number]  : 1970    Chief Complaint   Patient presents with   • Follow-up   • Thyroid Problem       History of Present Illness:   Ivis Mcginnis is a 50 y.o. female who presents today for hypothyroidism.  Synthroid was decreased from alternating doses of 88 mcg and 75 mcg every other day to 75 mcg once daily 2 months ago.  She reports taking this correctly and regularly.  She notes fatigue.  She notes cold intolerance.  She notes trouble losing weight.  She reports having symptoms of a cold last week.  Testing was negative for Covid-19.  She reports that neck has seemed echevarria recently.      Subjective      The following portions of the patient's history were reviewed and updated as appropriate: allergies, current medications, past family history, past medical history, past social history, past surgical history and problem list.    Objective     Vitals:    21 0921   BP: 118/74   Pulse: 77   Temp: 97.1 °F (36.2 °C)   TempSrc: Infrared   SpO2: 99%   Weight: 63.5 kg (140 lb)   Height: 165.1 cm (65\")   PainSc: 0-No pain     Body mass index is 23.3 kg/m².    Physical Exam  Vitals signs reviewed.   Constitutional:       General: She is not in acute distress.     Appearance: Normal appearance.   Neck:      Musculoskeletal: Normal range of motion and neck supple.      Thyroid: No thyroid mass, thyromegaly or thyroid tenderness.   Lymphadenopathy:      Cervical: No cervical adenopathy.   Neurological:      Mental Status: She is alert and oriented to person, place, and time.   Psychiatric:         Attention and Perception: Attention normal.         Mood and Affect: Mood and affect normal.         Lab Results   Component Value Date    TSH 0.166 (L) 2020    FREET4 1.46 2020       Current Outpatient Medications   Medication Instructions   • ammonium lactate (AMLACTIN) 12 % cream 1 application, Topical, As Needed   • ascorbic acid " (VITAMIN C) 1,000 mg, Oral, Daily   • aspirin 81 mg, Oral, Every 3 Days   • Biotin 50536 MCG tablet 1 tablet, Oral, Daily   • Calcium Carb-Cholecalciferol (CALCIUM 600+D3 PO) 1 tablet, Oral, Daily   • Cholecalciferol (VITAMIN D) 2000 UNITS capsule Oral   • levothyroxine (SYNTHROID, LEVOTHROID) 75 mcg, Oral, Daily   • loratadine-pseudoephedrine (CLARITIN-D 24-hour)  MG per 24 hr tablet 1 tablet, Oral, Daily   • Multiple Vitamins-Minerals (MULTIVITAMIN ADULT PO) 1 tablet, Oral, Daily   • Restasis 0.05 % ophthalmic emulsion INSTILL ONE DROP IN AFFECTED EYE(S) EVERY 12 HOURS   • tacrolimus (PROTOPIC) 0.1 % ointment 1 application, Topical, As Needed   • topiramate (TOPAMAX) 50 MG tablet Take 1 tablet by mouth twice daily   • triamcinolone (KENALOG) 0.1 % cream 1 application, Topical, As Needed   • Triamcinolone Acetonide (KENALOG IJ) 1 dose, Injection   • valACYclovir (VALTREX) 1000 MG tablet Take 1 tablet by mouth once daily   • VAQTA 50 UNIT/ML injection No dose, route, or frequency recorded.       Assessment / Plan      Assessment & Plan:  1. Acquired hypothyroidism  She is having symptoms that may be related to hypothyroidism.  Check TFTs.  She is taking biotin which could interfere with the lab results.  She will stop the biotin for at least 3 days and then have labs drawn.  Will notify her of results and if dose adjustment indicated.  - TSH; Future  - T4, Free; Future       Return in about 3 months (around 4/20/2021) for Next scheduled follow up.    GAYLE Cruz  01/20/2021

## 2021-01-25 ENCOUNTER — OFFICE VISIT (OUTPATIENT)
Dept: OBSTETRICS AND GYNECOLOGY | Facility: CLINIC | Age: 51
End: 2021-01-25

## 2021-01-25 VITALS
SYSTOLIC BLOOD PRESSURE: 122 MMHG | HEIGHT: 65 IN | WEIGHT: 139.2 LBS | BODY MASS INDEX: 23.19 KG/M2 | DIASTOLIC BLOOD PRESSURE: 78 MMHG

## 2021-01-25 DIAGNOSIS — Z12.12 SCREENING FOR MALIGNANT NEOPLASM OF THE RECTUM: ICD-10-CM

## 2021-01-25 DIAGNOSIS — Z12.11 SPECIAL SCREENING FOR MALIGNANT NEOPLASMS, COLON: ICD-10-CM

## 2021-01-25 DIAGNOSIS — Z01.419 ENCOUNTER FOR GYNECOLOGICAL EXAMINATION WITHOUT ABNORMAL FINDING: ICD-10-CM

## 2021-01-25 DIAGNOSIS — Z87.440 HISTORY OF RECURRENT UTIS: ICD-10-CM

## 2021-01-25 DIAGNOSIS — Z79.890 POST-MENOPAUSE ON HRT (HORMONE REPLACEMENT THERAPY): Primary | ICD-10-CM

## 2021-01-25 DIAGNOSIS — N60.11 FIBROCYSTIC BREAST CHANGES, BILATERAL: ICD-10-CM

## 2021-01-25 DIAGNOSIS — N60.12 FIBROCYSTIC BREAST CHANGES, BILATERAL: ICD-10-CM

## 2021-01-25 PROCEDURE — 99396 PREV VISIT EST AGE 40-64: CPT | Performed by: OBSTETRICS & GYNECOLOGY

## 2021-01-25 RX ORDER — ESTRADIOL 1 MG/1
1.5 TABLET ORAL DAILY
Qty: 135 TABLET | Refills: 3 | Status: SHIPPED | OUTPATIENT
Start: 2021-01-25 | End: 2022-01-24 | Stop reason: SDUPTHER

## 2021-01-25 NOTE — PROGRESS NOTES
Chief Complaint   Patient presents with   • Annual Exam     discuss estrogen prescription.  patient currently taking Premarin and is concerned about cost.  also having occasional hot flashes and vaginal dryness.       Ivis Mcginnis is a 50 y.o. year old  presenting to be seen for her annual exam.  This patient has previously had a hysteroscopy D&C,  A diagnostic laparoscopy with laser ablation of endometriosis; and in 2007 I did a laparoscopically assisted vaginal hysterectomy/vaginal vault suspension to uterosacral ligaments for endometriosis and pelvic adhesive disease.  She currently takes Premarin, 0.625 mg daily and does not have relief of menopausal symptoms completely.  She also is complaining of the cost of this medication.  She desires to change to a generic medication.  She has no side effects on Premarin.  She denies bowel or urinary symptoms.  She desires Cologuard screening.    SCREENING TESTS    Year 2012   Age                         PAP                         HPV high risk                         Mammogram         benign benign               NILO score                         Breast MRI                         Lipids                         Vitamin D                         Colonoscopy                         DEXA  Frax (hip/any)                         Ovarian Screen                             She exercises regularly: yes.  She wears her seat belt: yes.  She has concerns about domestic violence: no.  She has noticed changes in height: no    GYN screening history:  · Last mammogram: was done on approximately 2021 and the result was: Birads II (Benign findings)..    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  She does not have any pertinent problems on file.  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  Her family history includes Coronary artery disease in her father; Hypertension in her father; Kidney disease in her mother.  She  reports that she has never smoked. She has never used smokeless tobacco. She reports that she does not drink alcohol or use drugs.  Current Outpatient Medications   Medication Sig Dispense Refill   • ammonium lactate (AMLACTIN) 12 % cream Apply 1 application topically to the appropriate area as directed As Needed.     • ascorbic acid (VITAMIN C) 1000 MG tablet Take 1,000 mg by mouth Daily.     • aspirin 81 MG chewable tablet Chew 81 mg Every 3 (Three) Days.     • Biotin 60738 MCG tablet Take 1 tablet by mouth Daily.     • Calcium Carb-Cholecalciferol (CALCIUM 600+D3 PO) Take 1 tablet by mouth Daily.     • Cholecalciferol (VITAMIN D) 2000 UNITS capsule Take  by mouth.     • estradiol (ESTRACE) 1 MG tablet Take 1.5 tablets by mouth Daily. 135 tablet 3   • levothyroxine (SYNTHROID, LEVOTHROID) 75 MCG tablet Take 1 tablet by mouth Daily.     • loratadine-pseudoephedrine (CLARITIN-D 24-hour)  MG per 24 hr tablet Take 1 tablet by mouth Daily. 90 tablet 2   • Multiple Vitamins-Minerals (MULTIVITAMIN ADULT PO) Take 1 tablet by mouth Daily.     • Restasis 0.05 % ophthalmic emulsion INSTILL ONE DROP IN AFFECTED EYE(S) EVERY 12 HOURS     • tacrolimus (PROTOPIC) 0.1 % ointment Apply 1 application topically to the appropriate area as directed As Needed.     • topiramate (TOPAMAX) 50 MG tablet Take 1 tablet by mouth twice daily 60 tablet 5   • triamcinolone  "(KENALOG) 0.1 % cream Apply 1 application topically to the appropriate area as directed As Needed.     • Triamcinolone Acetonide (KENALOG IJ) Inject 1 dose as directed.     • valACYclovir (VALTREX) 1000 MG tablet Take 1 tablet by mouth once daily 90 tablet 2   • VAQTA 50 UNIT/ML injection        No current facility-administered medications for this visit.      She is allergic to dezocine; pentamidine; and piperacillin sod-tazobactam so..    Review of Systems  A review of systems was taken.  She denies cough, fever, shortness of breath, and loss of her sense of taste or smell  Constitutional: negative for fever, chills, activity change, appetite change, fatigue and unexpected weight change.  Respiratory: negative  Cardiovascular: negative  Gastrointestinal: negative  Genitourinary:negative  Musculoskeletal:negative  Behavioral/Psych: negative     Counseling/Anticipatory Guidance Discussed: nutrition, physical activity, healthy weight, immunizations, screenings and self-breast exam      /78   Ht 165.1 cm (65\")   Wt 63.1 kg (139 lb 3.2 oz)   Breastfeeding No   BMI 23.16 kg/m²     MEDICALLY INDICATED   Physical Exam    General:  alert; cooperative; well developed; well nourished   Skin:  No suspicious lesions seen   Thyroid: normal to inspection and palpation   Lungs:  breathing is unlabored  clear to auscultation bilaterally   Heart:  regular rate and rhythm, S1, S2 normal, no murmur, click, rub or gallop  normal apical impulse   Breasts:  Examined in supine position  Symmetric without masses or skin dimpling  Nipples normal without inversion, lesions or discharge  There are no palpable axillary nodes  Fibrocystic changes are present both breasts without a discrete mass   Abdomen: soft, non-tender; no masses  no umbilical or inguinal hernias are present  no hepato-splenomegaly   Pelvis: Clinical staff was present for exam  External genitalia:  normal appearance of the external genitalia including Bartholin's " and Colwyn's glands.  Vaginal:  normal pink mucosa without prolapse or lesions.  Cervix:  absent.  Uterus:  absent.  Adnexa:  non palpable bilaterally.  Rectal:  anus visually normal appearing. recto-vaginal exam unremarkable and confirms findings;     Lab Review   No data reviewed    Imaging  Mammogram results        Advance directives- NO ( the patient was given information about establishing advanced directives)      ASSESSMENT  Problems Addressed this Visit        Other    Post-menopause on HRT (hormone replacement therapy) - Primary    Relevant Medications    estradiol (ESTRACE) 1 MG tablet    History of recurrent UTIs    Fibrocystic breast changes, bilateral      Other Visit Diagnoses     Encounter for gynecological examination without abnormal finding        Special screening for malignant neoplasms, colon        Relevant Orders    Cologuard - Stool, Per Rectum    Screening for malignant neoplasm of the rectum        Relevant Orders    Cologuard - Stool, Per Rectum      Diagnoses       Codes Comments    Post-menopause on HRT (hormone replacement therapy)    -  Primary ICD-10-CM: Z79.890  ICD-9-CM: V07.4     History of recurrent UTIs     ICD-10-CM: Z87.440  ICD-9-CM: V13.02     Fibrocystic breast changes, bilateral     ICD-10-CM: N60.11, N60.12  ICD-9-CM: 610.1     Encounter for gynecological examination without abnormal finding     ICD-10-CM: Z01.419  ICD-9-CM: V72.31     Special screening for malignant neoplasms, colon     ICD-10-CM: Z12.11  ICD-9-CM: V76.51     Screening for malignant neoplasm of the rectum     ICD-10-CM: Z12.12  ICD-9-CM: V76.41               Substance History:   reports that she has never smoked. She has never used smokeless tobacco.   reports no history of alcohol use.   reports no history of drug use.    Substance use counseling is not indicated based on patient history.      PLAN    Medications prescribed this encounter:    New Medications Ordered This Visit   Medications   • estradiol  (ESTRACE) 1 MG tablet     Sig: Take 1.5 tablets by mouth Daily.     Dispense:  135 tablet     Refill:  3   · Cologuard ordered  · Monthly self breast assessment and annual breast imaging  · The patient will contact me if her menopausal symptoms are not relieved  · Calcium, 600 mg/ Vit. D, 400 IU daily; regular weight-bearing exercise  · Follow up: 12 month(s)  *Please note that portions of this documentation may have been completed with a voice recognition program.  Efforts were made to edit this dictation, but occasional words may have been mistranscribed.       This note was electronically signed.    DOROTHEA Avila MD  January 25, 2021  10:45 EST

## 2021-01-26 ENCOUNTER — LAB (OUTPATIENT)
Dept: LAB | Facility: HOSPITAL | Age: 51
End: 2021-01-26

## 2021-01-26 DIAGNOSIS — E03.9 ACQUIRED HYPOTHYROIDISM: Chronic | ICD-10-CM

## 2021-01-26 LAB
T4 FREE SERPL-MCNC: 1.39 NG/DL (ref 0.93–1.7)
TSH SERPL DL<=0.05 MIU/L-ACNC: 1.1 UIU/ML (ref 0.27–4.2)

## 2021-01-26 PROCEDURE — 36415 COLL VENOUS BLD VENIPUNCTURE: CPT

## 2021-01-26 PROCEDURE — 84439 ASSAY OF FREE THYROXINE: CPT

## 2021-01-26 PROCEDURE — 84443 ASSAY THYROID STIM HORMONE: CPT

## 2021-01-27 ENCOUNTER — TELEPHONE (OUTPATIENT)
Dept: ENDOCRINOLOGY | Facility: CLINIC | Age: 51
End: 2021-01-27

## 2021-01-27 RX ORDER — LEVOTHYROXINE SODIUM 75 MCG
75 TABLET ORAL DAILY
Qty: 90 TABLET | Refills: 1 | Status: SHIPPED | OUTPATIENT
Start: 2021-01-27 | End: 2021-01-27 | Stop reason: SDUPTHER

## 2021-01-27 RX ORDER — LEVOTHYROXINE SODIUM 0.07 MG/1
75 TABLET ORAL DAILY
Qty: 90 TABLET | Refills: 1 | Status: SHIPPED | OUTPATIENT
Start: 2021-01-27 | End: 2021-08-30

## 2021-01-27 NOTE — TELEPHONE ENCOUNTER
Pt calling says she received a temporary dose for Synthroid it says take every other day but she's used to taking the original dose daily she's not sure what to do and doesn't want to take the wrong dose please advise

## 2021-01-27 NOTE — TELEPHONE ENCOUNTER
PT. HAS BEEN USING GENERIC SYNTHROID FOR 2 MONTHS AND LABS WERE NORMAL, WOULD PREFER TO STAY ON GENERIC D/T PRICING. PLEASE SNED IN RX. FOR GENERIC. DARYN

## 2021-02-09 ENCOUNTER — TELEPHONE (OUTPATIENT)
Dept: OBSTETRICS AND GYNECOLOGY | Facility: CLINIC | Age: 51
End: 2021-02-09

## 2021-02-09 NOTE — TELEPHONE ENCOUNTER
I have contacted the patient and let her know that I have again faxed the order for Cologuard to [employer] [facility].  I have advised her to wait 7 days and if she has not heard from them, to call back.

## 2021-02-09 NOTE — TELEPHONE ENCOUNTER
Patient called and was wanting to know what she needs to do about her Cologuard?  She was seen on 1/25/21 and was told that someone would be in touch with her but she hasn't heard anything and she hasn't received anything from the [employer].    Please give her a call at [phone]

## 2021-02-18 ENCOUNTER — TELEPHONE (OUTPATIENT)
Dept: OBSTETRICS AND GYNECOLOGY | Facility: CLINIC | Age: 51
End: 2021-02-18

## 2021-02-18 NOTE — TELEPHONE ENCOUNTER
We have faxed an order to [facility] [employer] [facility] for Marty.  The last time was approximately 10 days ago.  The patient has not heard from the company and has not received a test kit.  She states that before Dr. Avila ordered this test, she actually had it ordered by another provider and she canceled the test.  She wonders if this could be the problem?  I have given the patient the number for [employer] and have asked her to call, since she feels that her previous interaction with this company may be why she isn't receiving her test kit.  She will call back if she needs further assistance from our office.

## 2021-02-26 ENCOUNTER — TELEPHONE (OUTPATIENT)
Dept: FAMILY MEDICINE CLINIC | Facility: CLINIC | Age: 51
End: 2021-02-26

## 2021-04-09 ENCOUNTER — TELEPHONE (OUTPATIENT)
Dept: FAMILY MEDICINE CLINIC | Facility: CLINIC | Age: 51
End: 2021-04-09

## 2021-04-09 RX ORDER — FLUCONAZOLE 200 MG/1
TABLET ORAL
Qty: 2 TABLET | Refills: 0 | Status: SHIPPED | OUTPATIENT
Start: 2021-04-09 | End: 2021-05-10 | Stop reason: ALTCHOICE

## 2021-04-09 NOTE — TELEPHONE ENCOUNTER
Caller: Ivis Mcginnis    Relationship: Self    Best call back number: [phone]     What medication are you requesting: SOMETHING FOR WHAT THE PATIENT BELIEVES IS A YEAST INFECTION     What are your current symptoms: VAGINAL ITCHING AND BURNING    How long have you been experiencing symptoms: 1 DAY    Have you had these symptoms before:    [x] Yes  [] No    Have you been treated for these symptoms before:   [x] Yes  [] No    If a prescription is needed, what is your preferred pharmacy and phone number: [id number] -  [facility] [place] [place] [facility] [address] [facility] [phone] [facility] [phone]      Additional notes:    PLEASE CALL THE PATIENT IF ANY QUESTIONS OR CONCERNS -[phone].

## 2021-05-10 ENCOUNTER — LAB (OUTPATIENT)
Dept: LAB | Facility: HOSPITAL | Age: 51
End: 2021-05-10

## 2021-05-10 ENCOUNTER — OFFICE VISIT (OUTPATIENT)
Dept: ENDOCRINOLOGY | Facility: CLINIC | Age: 51
End: 2021-05-10

## 2021-05-10 VITALS
DIASTOLIC BLOOD PRESSURE: 62 MMHG | TEMPERATURE: 98 F | WEIGHT: 138 LBS | HEIGHT: 65 IN | BODY MASS INDEX: 22.99 KG/M2 | SYSTOLIC BLOOD PRESSURE: 100 MMHG | HEART RATE: 68 BPM

## 2021-05-10 DIAGNOSIS — E03.9 ACQUIRED HYPOTHYROIDISM: Chronic | ICD-10-CM

## 2021-05-10 DIAGNOSIS — E03.9 HYPOTHYROIDISM, UNSPECIFIED TYPE: ICD-10-CM

## 2021-05-10 DIAGNOSIS — E03.9 ACQUIRED HYPOTHYROIDISM: Primary | Chronic | ICD-10-CM

## 2021-05-10 LAB — TSH SERPL DL<=0.05 MIU/L-ACNC: 0.64 UIU/ML (ref 0.27–4.2)

## 2021-05-10 PROCEDURE — 99213 OFFICE O/P EST LOW 20 MIN: CPT | Performed by: INTERNAL MEDICINE

## 2021-05-10 PROCEDURE — 84443 ASSAY THYROID STIM HORMONE: CPT

## 2021-05-10 NOTE — PROGRESS NOTES
"     Office Note      Date: 05/10/2021  Patient Name: Ivis Mcginnis  MRN: [id number]  : 1970    Chief Complaint   Patient presents with   • Hypothyroidism       History of Present Illness:   Ivis Mcginnis is a 50 y.o. female who presents for Hypothyroidism    She remains on T4 75mcg qd. She is taking this correctly. She isn't taking any interfering meds concurrently. However she is taking biotin supplement.  She hasn't noted any change in the size of her neck. She denies any compressive sxs. She denies any sxs of hypo- or hyperthyroidism at this time, aside from fatigue.    Subjective      Review of Systems:   Review of Systems   Constitutional: Positive for fatigue.   Cardiovascular: Negative.    Gastrointestinal: Negative.    Endocrine: Negative.        The following portions of the patient's history were reviewed and updated as appropriate: allergies, current medications, past family history, past medical history, past social history, past surgical history and problem list.    Objective     Visit Vitals  /62 (BP Location: Right arm, Patient Position: Sitting, Cuff Size: Adult)   Pulse 68   Temp 98 °F (36.7 °C) (Infrared)   Ht 165.1 cm (65\")   Wt 62.6 kg (138 lb)   BMI 22.96 kg/m²       Physical Exam:  Physical Exam  Constitutional:       Appearance: Normal appearance.   Neck:      Thyroid: No thyroid mass, thyromegaly or thyroid tenderness.   Lymphadenopathy:      Cervical: No cervical adenopathy.   Neurological:      Mental Status: She is alert.         Labs:    TSH  No results found for: TSHBASE     Free T4  Free T4   Date Value Ref Range Status   2021 1.39 0.93 - 1.70 ng/dL Final       T3  No results found for: B0FODIM      TPO  No results found for: THYROIDAB    TG AB  No results found for: THGAB    TG  No results found for: THYROGLB    CBC w/DIFF  Lab Results   Component Value Date    WBC 4.64 2020    RBC 3.59 (L) 2020    HGB 13.3 2020    HCT 37.4 2020    .2 " (H) 11/03/2020    MCH 37.0 (H) 11/03/2020    MCHC 35.6 11/03/2020    RDW 11.5 (L) 11/03/2020     11/03/2020    NEUTRORELPCT 43.4 11/03/2020    LYMPHORELPCT 43.5 11/03/2020    MONORELPCT 9.1 11/03/2020    EOSRELPCT 3.2 11/03/2020    BASORELPCT 0.6 11/03/2020    NEUTROABS 2.01 11/03/2020    LYMPHSABS 2.02 11/03/2020    MONOSABS 0.42 11/03/2020    EOSABS 0.15 11/03/2020    BASOSABS 0.03 11/03/2020    NRBC 0.0 11/03/2020           Assessment / Plan      Assessment & Plan:  Diagnoses and all orders for this visit:    1. Acquired hypothyroidism (Primary)  Assessment & Plan:  Symptomatically not too bad.  Continue T4 tx.  Check TSH today.  She is still taking biotin but forgot to stop it before her appointment.      Orders:  -     TSH; Future      Return in about 3 months (around 8/10/2021) for Recheck with TSH.    Trey Higuera MD   05/10/2021

## 2021-05-10 NOTE — ASSESSMENT & PLAN NOTE
Symptomatically not too bad.  Continue T4 tx.  Check TSH today.  She is still taking biotin but forgot to stop it before her appointment.

## 2021-05-21 DIAGNOSIS — N30.90 RECURRENT CYSTITIS: Primary | ICD-10-CM

## 2021-05-21 RX ORDER — NITROFURANTOIN 25; 75 MG/1; MG/1
CAPSULE ORAL
Qty: 45 CAPSULE | Refills: 2 | Status: SHIPPED | OUTPATIENT
Start: 2021-05-21 | End: 2021-08-27

## 2021-06-17 ENCOUNTER — TELEPHONE (OUTPATIENT)
Dept: ENDOCRINOLOGY | Facility: CLINIC | Age: 51
End: 2021-06-17

## 2021-06-17 ENCOUNTER — LAB (OUTPATIENT)
Dept: LAB | Facility: HOSPITAL | Age: 51
End: 2021-06-17

## 2021-06-17 DIAGNOSIS — E03.9 ACQUIRED HYPOTHYROIDISM: ICD-10-CM

## 2021-06-17 DIAGNOSIS — E03.9 ACQUIRED HYPOTHYROIDISM: Primary | ICD-10-CM

## 2021-06-17 PROCEDURE — 84443 ASSAY THYROID STIM HORMONE: CPT

## 2021-06-17 NOTE — TELEPHONE ENCOUNTER
Patient called and said she feels like her thyroid levels are off and has been feeling bad, she wants to know if she can have labs ordered and put into her chart. Please let her know when they're active

## 2021-06-17 NOTE — TELEPHONE ENCOUNTER
TSH order is in the chart.  She needs to stop the biotin for several days before getting labs done.

## 2021-06-17 NOTE — TELEPHONE ENCOUNTER
Patient notified and verbalized understanding.  She states she has not taken the Biotin in a week in anticipation of getting labs done.

## 2021-06-18 LAB — TSH SERPL DL<=0.05 MIU/L-ACNC: 0.6 UIU/ML (ref 0.27–4.2)

## 2021-08-27 ENCOUNTER — OFFICE VISIT (OUTPATIENT)
Dept: FAMILY MEDICINE CLINIC | Facility: CLINIC | Age: 51
End: 2021-08-27

## 2021-08-27 VITALS
WEIGHT: 138 LBS | TEMPERATURE: 97.6 F | SYSTOLIC BLOOD PRESSURE: 112 MMHG | HEART RATE: 76 BPM | HEIGHT: 65 IN | RESPIRATION RATE: 18 BRPM | BODY MASS INDEX: 22.99 KG/M2 | DIASTOLIC BLOOD PRESSURE: 80 MMHG

## 2021-08-27 DIAGNOSIS — L23.9 ALLERGIC DERMATITIS: Primary | ICD-10-CM

## 2021-08-27 PROCEDURE — 99213 OFFICE O/P EST LOW 20 MIN: CPT | Performed by: FAMILY MEDICINE

## 2021-08-27 RX ORDER — PREDNISONE 20 MG/1
40 TABLET ORAL DAILY
Qty: 10 TABLET | Refills: 0 | Status: SHIPPED | OUTPATIENT
Start: 2021-08-27 | End: 2021-09-07

## 2021-08-27 RX ORDER — HYDROXYZINE 50 MG/1
TABLET, FILM COATED ORAL
Qty: 20 TABLET | Refills: 1 | Status: SHIPPED | OUTPATIENT
Start: 2021-08-27 | End: 2022-03-28

## 2021-08-27 NOTE — PROGRESS NOTES
Farhan Mcginnis is a 51 y.o. female.     History of Present Illness     She had a rash in her arm pits about 2-3 weeks ago  Tried OTC stuff without help  Stopped deoderant, etc  Then the rash was itchy and spread to other spots  More red after a shower  scratching it makes it red as well      Review of Systems   Constitutional: Negative.        Objective   Physical Exam  Vitals and nursing note reviewed.   Constitutional:       General: She is not in acute distress.     Appearance: Normal appearance. She is well-developed.   Cardiovascular:      Rate and Rhythm: Normal rate and regular rhythm.      Heart sounds: Normal heart sounds.   Pulmonary:      Effort: Pulmonary effort is normal.      Breath sounds: Normal breath sounds.   Skin:     Comments: Scattered light red macules   Neurological:      Mental Status: She is alert and oriented to person, place, and time.   Psychiatric:         Mood and Affect: Mood normal.         Behavior: Behavior normal.         Thought Content: Thought content normal.         Judgment: Judgment normal.         Assessment/Plan   Diagnoses and all orders for this visit:    1. Allergic dermatitis (Primary)  -     predniSONE (DELTASONE) 20 MG tablet; Take 2 tablets by mouth Daily.  Dispense: 10 tablet; Refill: 0  -     hydrOXYzine (ATARAX) 50 MG tablet; 1/2-1 po q 6 hours itchin  Dispense: 20 tablet; Refill: 1      pred 40 mg daily 4 days and then PRN atarax

## 2021-08-30 RX ORDER — LEVOTHYROXINE SODIUM 75 UG/1
TABLET ORAL
Qty: 90 TABLET | Refills: 0 | Status: SHIPPED | OUTPATIENT
Start: 2021-08-30 | End: 2022-02-25

## 2021-09-06 ENCOUNTER — NURSE TRIAGE (OUTPATIENT)
Dept: CALL CENTER | Facility: HOSPITAL | Age: 51
End: 2021-09-06

## 2021-09-06 NOTE — TELEPHONE ENCOUNTER
"States she saw Dr. Odom 8/27/21 for a rash. States she was given steroids and was told if it didn't get better or if it came back to call back. States rash went away and came back on Friday. States it has worsened over the weekend. States OTC hydrocortisone cream helps \"slightly\". States she took the hydroxyzine he prescribed last night and it helped.     Instructed to call office when they are open tomorrow. Will send secure chat message to provider on call but do not expect anything different to be done today. She verbalized understanding    Reason for Disposition  • Mild localized rash    Additional Information  • Negative: [1] Sudden onset of rash (within last 2 hours) AND [2] difficulty breathing or swallowing  • Negative: Sounds like a life-threatening emergency to the triager  • Negative: Poison ivy, oak, or sumac contact suspected  • Negative: Insect bite(s) suspected  • Negative: Ringworm suspected (i.e., round pink patch, sometimes looks like ring, usually 1/2 to 1 inch [12-25 mm],  in size, slowly increasing in size)  • Negative: Athlete's Foot suspected (i.e., itchy rash between the toes)  • Negative: Jock Itch suspected (i.e., itchy rash on inner thighs near genital area)  • Negative: Wound infection suspected (i.e., pain, spreading redness, or pus; in a cut, puncture, scrape or sutured wound)  • Negative: Impetigo suspected  (i.e., painless infected superficial small sores, less than 1 inch or 2.5 cm, often covered by a soft, yellow-brown scab or crust; sometimes occurring near nasal openings)  • Negative: Shingles suspected (i.e., painful rash, multiple small blisters grouped together in one area of body; dermatomal distribution)  • Negative: Rash of external female genital area (vulva)  • Negative: Rash of penis or scrotum  • Negative: Small spot, skin growth, or mole  • Negative: Sores or skin ulcer, not a rash  • Negative: Localized lump (or swelling) without redness or rash  • Negative: [1] " "Localized purple or blood-colored spots or dots AND [2] not from injury or friction AND [3] fever  • Negative: Patient sounds very sick or weak to the triager  • Negative: [1] Red area or streak AND [2] fever  • Negative: [1] Rash is painful to touch AND [2] fever  • Negative: [1] Looks infected (spreading redness, pus) AND [2] large red area (> 2 in. or 5 cm)  • Negative: [1] Looks infected (spreading redness, pus) AND [2] diabetes mellitus or weak immune system (e.g., HIV positive, cancer chemo, splenectomy, organ transplant, chronic steroids)  • Negative: [1] Localized purple or blood-colored spots or dots AND [2] not from injury or friction AND [3] no fever  • Negative: [1] Looks infected (spreading redness, pus) AND [2] no fever  • Negative: Looks like a boil, infected sore, deep ulcer or other infected rash  • Negative: [1] Localized rash is very painful AND [2] no fever  • Negative: Genital area rash  • Negative: Lyme disease suspected (e.g., bull's eye rash or tick bite / exposure)  • Negative: [1] Applying cream or ointment AND [2] causes severe itch, burning or pain  • Negative: Medication patch causing local rash or itching  • Negative: [1] Pimples (localized) AND [2 ] no improvement after using CARE ADVICE  • Negative: Tender bumps in armpits  • Negative: [1] Severe localized itching AND [2] after 2 days of steroid cream  • Negative: Localized rash present > 7 days  • Negative: Red, moist, irritated area between skin folds (or under larger breasts)  • Negative: [1] Localized area of skin darkening or thickening on lower legs or ankles AND [2] has NOT been evaluated by a doctor (or NP/PA)    Answer Assessment - Initial Assessment Questions  1. APPEARANCE of RASH: \"Describe the rash.\"       See note  2. LOCATION: \"Where is the rash located?\"       n/a  3. NUMBER: \"How many spots are there?\"       n/a  4. SIZE: \"How big are the spots?\" (Inches, centimeters or compare to size of a coin)       n/a  5. ONSET: " "\"When did the rash start?\"       n/a  6. ITCHING: \"Does the rash itch?\" If Yes, ask: \"How bad is the itch?\"  (Scale 1-10; or mild, moderate, severe)      n/a  7. PAIN: \"Does the rash hurt?\" If Yes, ask: \"How bad is the pain?\"  (Scale 1-10; or mild, moderate, severe)      n/  8. OTHER SYMPTOMS: \"Do you have any other symptoms?\" (e.g., fever)      An/  9. PREGNANCY: \"Is there any chance you are pregnant?\" \"When was your last menstrual period?\"      n/a    Protocols used: RASH OR REDNESS - LOCALIZED-ADULT-AH      "

## 2021-09-07 ENCOUNTER — TELEPHONE (OUTPATIENT)
Dept: FAMILY MEDICINE CLINIC | Facility: CLINIC | Age: 51
End: 2021-09-07

## 2021-09-07 DIAGNOSIS — L30.9 DERMATITIS: ICD-10-CM

## 2021-09-07 DIAGNOSIS — L23.9 ALLERGIC DERMATITIS: Primary | ICD-10-CM

## 2021-09-07 RX ORDER — PREDNISONE 20 MG/1
TABLET ORAL
Qty: 16 TABLET | Refills: 0 | Status: SHIPPED | OUTPATIENT
Start: 2021-09-07 | End: 2021-11-09

## 2021-09-07 RX ORDER — PERMETHRIN 50 MG/G
CREAM TOPICAL
Qty: 60 G | Refills: 1 | Status: SHIPPED | OUTPATIENT
Start: 2021-09-07 | End: 2021-11-09

## 2021-09-07 NOTE — TELEPHONE ENCOUNTER
"Kristy Jackson, RN routed conversation to [facility] [employer] Clinical Pool 19 hours ago (11:18 AM)   Kristy Jackson, RN 19 hours ago (11:16 AM)   KWASI     States she saw Dr. Odom 8/27/21 for a rash. States she was given steroids and was told if it didn't get better or if it came back to call back. States rash went away and came back on Friday. States it has worsened over the weekend. States OTC hydrocortisone cream helps \"slightly\". States she took the hydroxyzine he prescribed last night and it helped.      Instructed to call office when they are open tomorrow. Will send secure chat message to provider on call but do not expect anything different to be done today. She verbalized understanding     Reason for Disposition  • Mild localized rash    Additional Information  • Negative: [1] Sudden onset of rash (within last 2 hours) AND [2] difficulty breathing or swallowing  • Negative: Sounds like a life-threatening emergency to the triager  • Negative: Poison ivy, oak, or sumac contact suspected  • Negative: Insect bite(s) suspected  • Negative: Ringworm suspected (i.e., round pink patch, sometimes looks like ring, usually 1/2 to 1 inch [12-25 mm],  in size, slowly increasing in size)  • Negative: Athlete's Foot suspected (i.e., itchy rash between the toes)  • Negative: Jock Itch suspected (i.e., itchy rash on inner thighs near genital area)  • Negative: Wound infection suspected (i.e., pain, spreading redness, or pus; in a cut, puncture, scrape or sutured wound)  • Negative: Impetigo suspected  (i.e., painless infected superficial small sores, less than 1 inch or 2.5 cm, often covered by a soft, yellow-brown scab or crust; sometimes occurring near nasal openings)  • Negative: Shingles suspected (i.e., painful rash, multiple small blisters grouped together in one area of body; dermatomal distribution)  • Negative: Rash of external female genital area (vulva)  • Negative: Rash of penis or scrotum  • Negative: " "Small spot, skin growth, or mole  • Negative: Sores or skin ulcer, not a rash  • Negative: Localized lump (or swelling) without redness or rash  • Negative: [1] Localized purple or blood-colored spots or dots AND [2] not from injury or friction AND [3] fever  • Negative: Patient sounds very sick or weak to the triager  • Negative: [1] Red area or streak AND [2] fever  • Negative: [1] Rash is painful to touch AND [2] fever  • Negative: [1] Looks infected (spreading redness, pus) AND [2] large red area (> 2 in. or 5 cm)  • Negative: [1] Looks infected (spreading redness, pus) AND [2] diabetes mellitus or weak immune system (e.g., HIV positive, cancer chemo, splenectomy, organ transplant, chronic steroids)  • Negative: [1] Localized purple or blood-colored spots or dots AND [2] not from injury or friction AND [3] no fever  • Negative: [1] Looks infected (spreading redness, pus) AND [2] no fever  • Negative: Looks like a boil, infected sore, deep ulcer or other infected rash  • Negative: [1] Localized rash is very painful AND [2] no fever  • Negative: Genital area rash  • Negative: Lyme disease suspected (e.g., bull's eye rash or tick bite / exposure)  • Negative: [1] Applying cream or ointment AND [2] causes severe itch, burning or pain  • Negative: Medication patch causing local rash or itching  • Negative: [1] Pimples (localized) AND [2 ] no improvement after using CARE ADVICE  • Negative: Tender bumps in armpits  • Negative: [1] Severe localized itching AND [2] after 2 days of steroid cream  • Negative: Localized rash present > 7 days  • Negative: Red, moist, irritated area between skin folds (or under larger breasts)  • Negative: [1] Localized area of skin darkening or thickening on lower legs or ankles AND [2] has NOT been evaluated by a doctor (or NP/PA)    Answer Assessment - Initial Assessment Questions  1. APPEARANCE of RASH: \"Describe the rash.\"       See note  2. LOCATION: \"Where is the rash located?\"       " "n/a  3. NUMBER: \"How many spots are there?\"       n/a  4. SIZE: \"How big are the spots?\" (Inches, centimeters or compare to size of a coin)       n/a  5. ONSET: \"When did the rash start?\"       n/a  6. ITCHING: \"Does the rash itch?\" If Yes, ask: \"How bad is the itch?\"  (Scale 1-10; or mild, moderate, severe)      n/a  7. PAIN: \"Does the rash hurt?\" If Yes, ask: \"How bad is the pain?\"  (Scale 1-10; or mild, moderate, severe)      n/  8. OTHER SYMPTOMS: \"Do you have any other symptoms?\" (e.g., fever)      An/  9. PREGNANCY: \"Is there any chance you are pregnant?\" \"When was your last menstrual period?\"      n/a    Protocols used: RASH OR REDNESS - LOCALIZED-ADULT-AH               Documentation        "

## 2021-09-07 NOTE — TELEPHONE ENCOUNTER
Longer steroid taper sent in along with some permethrin cream to try to treat other causes of rash.  If this fails, will get derm involved.

## 2021-09-07 NOTE — TELEPHONE ENCOUNTER
PATIENT CALLED TO FOLLOW UP ON MEDICATION REQUEST FOR RASH.  PATIENT STATED HER RASH IS BACK.    CALL BACK:  [phone]

## 2021-09-27 RX ORDER — VALACYCLOVIR HYDROCHLORIDE 1 G/1
TABLET, FILM COATED ORAL
Qty: 90 TABLET | Refills: 0 | Status: SHIPPED | OUTPATIENT
Start: 2021-09-27 | End: 2021-12-20

## 2021-09-29 ENCOUNTER — LAB (OUTPATIENT)
Dept: LAB | Facility: HOSPITAL | Age: 51
End: 2021-09-29

## 2021-09-29 ENCOUNTER — OFFICE VISIT (OUTPATIENT)
Dept: ENDOCRINOLOGY | Facility: CLINIC | Age: 51
End: 2021-09-29

## 2021-09-29 VITALS
OXYGEN SATURATION: 97 % | BODY MASS INDEX: 23.03 KG/M2 | WEIGHT: 138.2 LBS | SYSTOLIC BLOOD PRESSURE: 112 MMHG | HEART RATE: 76 BPM | HEIGHT: 65 IN | DIASTOLIC BLOOD PRESSURE: 68 MMHG

## 2021-09-29 DIAGNOSIS — E03.9 ACQUIRED HYPOTHYROIDISM: Chronic | ICD-10-CM

## 2021-09-29 DIAGNOSIS — E03.9 ACQUIRED HYPOTHYROIDISM: Primary | Chronic | ICD-10-CM

## 2021-09-29 PROCEDURE — 99213 OFFICE O/P EST LOW 20 MIN: CPT | Performed by: INTERNAL MEDICINE

## 2021-09-29 PROCEDURE — 84443 ASSAY THYROID STIM HORMONE: CPT

## 2021-09-29 NOTE — PROGRESS NOTES
"     Office Note      Date: 2021  Patient Name: Ivis Mcginnis  MRN: [id number]  : 1970    Chief Complaint   Patient presents with   • Hypothyroidism       History of Present Illness:   Ivis Mcginnis is a 51 y.o. female who presents for Hypothyroidism    She remains on T4 75mcg qd. She is taking this correctly. She isn't taking any interfering meds concurrently. However she is taking biotin supplement.  She hasn't noted any change in the size of her neck. She denies any compressive sxs. She denies any sxs of hypo- or hyperthyroidism at this time, aside from fatigue.  The fatigue comes and goes.    She developed a rash.  She had biopsy done that is pending.  She has been using steroid cream topically.    Subjective      Review of Systems:   Review of Systems   Constitutional: Positive for fatigue.   Cardiovascular: Negative.    Gastrointestinal: Negative.    Endocrine: Negative.        The following portions of the patient's history were reviewed and updated as appropriate: allergies, current medications, past family history, past medical history, past social history, past surgical history and problem list.    Objective     Visit Vitals  /68 (BP Location: Left arm, Patient Position: Sitting, Cuff Size: Adult)   Pulse 76   Ht 165.1 cm (65\")   Wt 62.7 kg (138 lb 3.2 oz)   SpO2 97%   BMI 23.00 kg/m²       Physical Exam:  Physical Exam  Constitutional:       Appearance: Normal appearance.   Neck:      Thyroid: No thyroid mass, thyromegaly or thyroid tenderness.   Lymphadenopathy:      Cervical: No cervical adenopathy.   Neurological:      Mental Status: She is alert.         Labs:    TSH  No results found for: TSHBASE     Free T4  Free T4   Date Value Ref Range Status   2021 1.39 0.93 - 1.70 ng/dL Final       T3  No results found for: E1GCJKV      TPO  No results found for: THYROIDAB    TG AB  No results found for: THGAB    TG  No results found for: THYROGLB    CBC w/DIFF  Lab Results   Component " Value Date    WBC 4.64 11/03/2020    RBC 3.59 (L) 11/03/2020    HGB 13.3 11/03/2020    HCT 37.4 11/03/2020    .2 (H) 11/03/2020    MCH 37.0 (H) 11/03/2020    MCHC 35.6 11/03/2020    RDW 11.5 (L) 11/03/2020     11/03/2020    NEUTRORELPCT 43.4 11/03/2020    LYMPHORELPCT 43.5 11/03/2020    MONORELPCT 9.1 11/03/2020    EOSRELPCT 3.2 11/03/2020    BASORELPCT 0.6 11/03/2020    NEUTROABS 2.01 11/03/2020    LYMPHSABS 2.02 11/03/2020    MONOSABS 0.42 11/03/2020    EOSABS 0.15 11/03/2020    BASOSABS 0.03 11/03/2020    NRBC 0.0 11/03/2020           Assessment / Plan      Assessment & Plan:  Diagnoses and all orders for this visit:    1. Acquired hypothyroidism (Primary)  Assessment & Plan:  Continue T4 tx.  Check TSH today.        Return in about 6 months (around 3/29/2022) for Recheck with TSH.    Trey Higuera MD   09/29/2021   will check labs as pt is worried

## 2021-09-30 LAB — TSH SERPL DL<=0.05 MIU/L-ACNC: 0.62 UIU/ML (ref 0.27–4.2)

## 2021-11-06 ENCOUNTER — DOCUMENTATION (OUTPATIENT)
Dept: FAMILY MEDICINE CLINIC | Facility: CLINIC | Age: 51
End: 2021-11-06

## 2021-11-06 ENCOUNTER — NURSE TRIAGE (OUTPATIENT)
Dept: CALL CENTER | Facility: HOSPITAL | Age: 51
End: 2021-11-06

## 2021-11-06 RX ORDER — NITROFURANTOIN 25; 75 MG/1; MG/1
100 CAPSULE ORAL 2 TIMES DAILY
Qty: 14 CAPSULE | Refills: 0 | Status: SHIPPED | OUTPATIENT
Start: 2021-11-06 | End: 2022-02-21

## 2021-11-06 NOTE — PROGRESS NOTES
Patient called with complaints of blood in urine and frequency. Reviewed chart and has history of recurrent UTI. Will start Macrobid 100 mg twice daily. She reported to the nurse some right upper chest pain. Will recommend ER or urgent care if not improving or if chest pain worsens. Of note, she also has history of kidney stone so needs to be evaluated if symptoms are worsening

## 2021-11-06 NOTE — TELEPHONE ENCOUNTER
Pain with urgency. Hematuria. Pan in upper chest.  Wanting something for UTI. Symptoms started today. Patient is to have medication called to her pharmacy. Informed if symptoms do not improve, to try an UC    Reason for Disposition  • Side (flank) or lower back pain present    Additional Information  • Negative: Shock suspected (e.g., cold/pale/clammy skin, too weak to stand, low BP, rapid pulse)  • Negative: Sounds like a life-threatening emergency to the triager  • Negative: Followed a female genital area injury (e.g., vagina, vulva)  • Negative: Followed a male genital area injury (e.g., penis, scrotum)  • Negative: Vaginal discharge  • Negative: Pus (white, yellow) or bloody discharge from end of penis  • Negative: [1] Taking antibiotic for urinary tract infection (UTI) AND [2] female  • Negative: [1] Taking antibiotic for urinary tract infection (UTI) AND [2] male  • Negative: [1] Discomfort (pain, burning or stinging) when passing urine AND [2] pregnant  • Negative: [1] Discomfort (pain, burning or stinging) when passing urine AND [2] postpartum (from 0 to 6 weeks after delivery)  • Negative: [1] Discomfort (pain, burning or stinging) when passing urine AND [2] female  • Negative: [1] Discomfort (pain, burning or stinging) when passing urine AND [2] male  • Negative: Pain or itching in the vulvar area  • Negative: Pain in scrotum is main symptom  • Negative: Blood in the urine is main symptom  • Negative: Symptoms arising from use of a urinary catheter (Johnson or Coude)  • Negative: [1] Unable to urinate (or only a few drops) > 4 hours AND [2] bladder feels very full (e.g., palpable bladder or strong urge to urinate)  • Negative: [1] Decreased urination and [2] drinking very little AND [2] dehydration suspected (e.g., dark urine, no urine > 12 hours, very dry mouth, very lightheaded)  • Negative: Patient sounds very sick or weak to the triager  • Negative: Fever > 100.4 F (38.0 C)    Answer Assessment - Initial  "Assessment Questions  1. SYMPTOM: \"What's the main symptom you're concerned about?\" (e.g., frequency, incontinence)   pain, urgency  And blood in urine  2. ONSET: \"When did the  11/6  start?\"   this morning  3. PAIN: \"Is there any pain?\" If Yes, ask: \"How bad is it?\" (Scale: 1-10; mild, moderate, severe)      moderate  4. CAUSE: \"What do you think is causing the symptoms?\"      UTI  5. OTHER SYMPTOMS: \"Do you have any other symptoms?\" (e.g., fever, flank pain, blood in urine, pain with urination)      Upper right chest pain  6. PREGNANCY: \"Is there any chance you are pregnant?\" \"When was your last menstrual period?\"      na    Protocols used: URINARY SYMPTOMS-ADULT-      "

## 2021-11-09 ENCOUNTER — OFFICE VISIT (OUTPATIENT)
Dept: FAMILY MEDICINE CLINIC | Facility: CLINIC | Age: 51
End: 2021-11-09

## 2021-11-09 VITALS
HEART RATE: 79 BPM | OXYGEN SATURATION: 100 % | HEIGHT: 65 IN | TEMPERATURE: 97.7 F | RESPIRATION RATE: 18 BRPM | DIASTOLIC BLOOD PRESSURE: 76 MMHG | WEIGHT: 136 LBS | BODY MASS INDEX: 22.66 KG/M2 | SYSTOLIC BLOOD PRESSURE: 126 MMHG

## 2021-11-09 DIAGNOSIS — M94.0 COSTOCHONDRITIS: ICD-10-CM

## 2021-11-09 DIAGNOSIS — R07.89 RIGHT-SIDED CHEST WALL PAIN: Primary | ICD-10-CM

## 2021-11-09 PROCEDURE — 99213 OFFICE O/P EST LOW 20 MIN: CPT | Performed by: FAMILY MEDICINE

## 2021-11-09 RX ORDER — HYDROCODONE BITARTRATE AND ACETAMINOPHEN 5; 325 MG/1; MG/1
1 TABLET ORAL EVERY 8 HOURS PRN
Qty: 20 TABLET | Refills: 0 | Status: SHIPPED | OUTPATIENT
Start: 2021-11-09 | End: 2022-02-21

## 2021-11-09 RX ORDER — PREDNISONE 20 MG/1
TABLET ORAL
Qty: 16 TABLET | Refills: 0 | Status: SHIPPED | OUTPATIENT
Start: 2021-11-09 | End: 2022-02-21

## 2021-11-09 NOTE — PROGRESS NOTES
Farhan Mcginnis is a 51 y.o. female.     History of Present Illness     She has had some right sided chest pain for the past 4-5 days  Started out of the blue Friday night  Pain runs along anterior right lower rib cage  This pain is stabbing from time to time  Aggravated: Deep breath, swallowing, yelling makes this pain worse  Alleviating: sitting up straight  No SOA noted  Feels like something stuck in this area        The following portions of the patient's history were reviewed and updated as appropriate: allergies, current medications, past family history, past medical history, past social history, past surgical history and problem list.    Review of Systems   Constitutional: Negative.    Respiratory: Negative for shortness of breath.    Cardiovascular: Positive for chest pain.       Objective   Physical Exam  Vitals and nursing note reviewed.   Constitutional:       General: She is not in acute distress.     Appearance: Normal appearance. She is well-developed.   Cardiovascular:      Rate and Rhythm: Normal rate and regular rhythm.      Heart sounds: Normal heart sounds.   Pulmonary:      Effort: Pulmonary effort is normal.      Breath sounds: Normal breath sounds.   Chest:       Neurological:      Mental Status: She is alert and oriented to person, place, and time.   Psychiatric:         Mood and Affect: Mood normal.         Behavior: Behavior normal.         Thought Content: Thought content normal.         Judgment: Judgment normal.         Assessment/Plan   Diagnoses and all orders for this visit:    1. Right-sided chest wall pain (Primary)  -     HYDROcodone-acetaminophen (NORCO) 5-325 MG per tablet; Take 1 tablet by mouth Every 8 (Eight) Hours As Needed for Moderate Pain .  Dispense: 20 tablet; Refill: 0  -     XR Chest PA & Lateral; Future    2. Costochondritis  -     predniSONE (DELTASONE) 20 MG tablet; 3 po daily 2 days then 2 po daily 3 days then 1 po daily 3 days then 1/2 po daily 2 days   Dispense: 16 tablet; Refill: 0  -     HYDROcodone-acetaminophen (NORCO) 5-325 MG per tablet; Take 1 tablet by mouth Every 8 (Eight) Hours As Needed for Moderate Pain .  Dispense: 20 tablet; Refill: 0    likely rib cage pain.  Treat with pred burst and short term lortab.  Consider CXR iNB.  Pt deborah

## 2021-12-20 RX ORDER — VALACYCLOVIR HYDROCHLORIDE 1 G/1
TABLET, FILM COATED ORAL
Qty: 90 TABLET | Refills: 0 | Status: SHIPPED | OUTPATIENT
Start: 2021-12-20 | End: 2022-03-21

## 2021-12-22 RX ORDER — TOPIRAMATE 50 MG/1
TABLET, FILM COATED ORAL
Qty: 60 TABLET | Refills: 3 | Status: SHIPPED | OUTPATIENT
Start: 2021-12-22 | End: 2022-08-23

## 2022-01-03 NOTE — TELEPHONE ENCOUNTER
Caller: Ras Ivis L    Relationship: Self    Best call back number: [phone] (H)    Requested Prescriptions:   Requested Prescriptions     Pending Prescriptions Disp Refills   • loratadine-pseudoephedrine (CLARITIN-D 24-hour)  MG per 24 hr tablet 90 tablet 2     Sig: Take 1 tablet by mouth Daily.        Pharmacy where request should be sent: [id number] [facility] [place] - [address] [place] [address] [address] [facility] [phone] [facility] [phone] FX     Additional details provided by patient: PATIENT IS OUT OF MEDICATION     Does the patient have less than a 3 day supply:  [x] Yes  [] No    Laurent Lynn Rep   01/03/22 12:03 EST

## 2022-01-24 ENCOUNTER — TELEPHONE (OUTPATIENT)
Dept: OBSTETRICS AND GYNECOLOGY | Facility: CLINIC | Age: 52
End: 2022-01-24

## 2022-01-24 RX ORDER — ESTRADIOL 1 MG/1
1.5 TABLET ORAL DAILY
Qty: 45 TABLET | Refills: 0 | Status: SHIPPED | OUTPATIENT
Start: 2022-01-24 | End: 2022-02-21 | Stop reason: SDUPTHER

## 2022-01-24 NOTE — TELEPHONE ENCOUNTER
Wants to wait until June for appt. I told her we cannot refill her meds until then as she was due for annual in January. She wants to discuss.

## 2022-01-24 NOTE — TELEPHONE ENCOUNTER
Patient is due for annual exam now.  She rescheduled for February 2022, but now wants to know if she can come in June 2022 and have HRT refills until that time?  I have advised the patient that Jehovah's witness policy dictates that we may not extend her prescription that far after an appointment is due.  She voiced understanding.

## 2022-01-24 NOTE — TELEPHONE ENCOUNTER
Rx Refill Note  Appointment with Maribel Lewis 2/21/22.  Patient already overdue for annual.  Pharmacy requested 90 day prescription, but will authorize 30 day.  Requested Prescriptions      No prescriptions requested or ordered in this encounter      Last office visit with prescribing clinician: Visit date not found      Next office visit with prescribing clinician: 2/21/2022            Suzanne Tong MA  01/24/22, 11:45 EST

## 2022-02-21 ENCOUNTER — OFFICE VISIT (OUTPATIENT)
Dept: OBSTETRICS AND GYNECOLOGY | Facility: CLINIC | Age: 52
End: 2022-02-21

## 2022-02-21 VITALS
DIASTOLIC BLOOD PRESSURE: 78 MMHG | HEIGHT: 65 IN | BODY MASS INDEX: 22.89 KG/M2 | SYSTOLIC BLOOD PRESSURE: 118 MMHG | WEIGHT: 137.4 LBS

## 2022-02-21 DIAGNOSIS — Z79.890 HORMONE REPLACEMENT THERAPY: ICD-10-CM

## 2022-02-21 DIAGNOSIS — Z78.0 MENOPAUSE: ICD-10-CM

## 2022-02-21 DIAGNOSIS — Z13.820 SCREENING FOR OSTEOPOROSIS: ICD-10-CM

## 2022-02-21 DIAGNOSIS — Z01.419 ENCOUNTER FOR GYNECOLOGICAL EXAMINATION WITHOUT ABNORMAL FINDING: Primary | ICD-10-CM

## 2022-02-21 PROCEDURE — 99396 PREV VISIT EST AGE 40-64: CPT | Performed by: NURSE PRACTITIONER

## 2022-02-21 RX ORDER — DUPILUMAB 300 MG/2ML
1 INJECTION, SOLUTION SUBCUTANEOUS
COMMUNITY
Start: 2022-01-25 | End: 2023-03-15

## 2022-02-21 RX ORDER — ESTRADIOL 1 MG/1
1.5 TABLET ORAL DAILY
Qty: 45 TABLET | Refills: 12 | Status: SHIPPED | OUTPATIENT
Start: 2022-02-21 | End: 2023-03-15 | Stop reason: SDUPTHER

## 2022-02-21 NOTE — PROGRESS NOTES
Chief Complaint  Ivis Mcginnis is a 51 y.o.  female presenting for Annual Exam and Med Refill (estradiol 1.5 mg (90 day with 3 refills), [facility])    History of Present Illness  Very pleasant 52yo woman, longtime pt of Dr. Valles, here for annual gyn exam.  No hospitalization or surgery since last visit with him.  Her leukemia is in remission.  The current ERT dose is so much better --- she has complete relief of VMS.  Denies any HA, nausea, or breast tenderness.  Has appt for Mammo (had to r/s due to her Father's death).  Needs DEXA scan.  But other pvt hlth procedures up to date.     The following portions of the patient's history were reviewed and updated as appropriate: allergies, current medications, past family history, past medical history, past social history, past surgical history and problem list.    Allergies   Allergen Reactions   • Dezocine Other (See Comments)     Severe headache   • Pentamidine Unknown - Low Severity   • Piperacillin Sod-Tazobactam So      Zosin         Current Outpatient Medications:   •  ascorbic acid (VITAMIN C) 1000 MG tablet, Take 1,000 mg by mouth Daily., Disp: , Rfl:   •  aspirin 81 MG chewable tablet, Chew 81 mg Every 3 (Three) Days., Disp: , Rfl:   •  Biotin 32516 MCG tablet, Take 1 tablet by mouth Daily., Disp: , Rfl:   •  Calcium Carb-Cholecalciferol (CALCIUM 600+D3 PO), Take 1 tablet by mouth Daily., Disp: , Rfl:   •  Cholecalciferol (VITAMIN D) 2000 UNITS capsule, Take  by mouth., Disp: , Rfl:   •  Dupixent 300 MG/2ML solution prefilled syringe, Inject 1 dose as directed Every 14 (Fourteen) Days., Disp: , Rfl:   •  estradiol (Estrace) 1 MG tablet, Take 1.5 tablets by mouth Daily., Disp: 45 tablet, Rfl: 12  •  Euthyrox 75 MCG tablet, Take 1 tablet by mouth once daily, Disp: 90 tablet, Rfl: 0  •  hydrOXYzine (ATARAX) 50 MG tablet, 1/2-1 po q 6 hours itchin, Disp: 20 tablet, Rfl: 1  •  loratadine-pseudoephedrine (CLARITIN-D 24-hour)  MG per 24 hr  "tablet,  One daily, Disp: 90 tablet, Rfl: 1  •  Multiple Vitamins-Minerals (MULTIVITAMIN ADULT PO), Take 1 tablet by mouth Daily., Disp: , Rfl:   •  Restasis 0.05 % ophthalmic emulsion, INSTILL ONE DROP IN AFFECTED EYE(S) EVERY 12 HOURS, Disp: , Rfl:   •  topiramate (TOPAMAX) 50 MG tablet, Take 1 tablet by mouth twice daily, Disp: 60 tablet, Rfl: 3  •  valACYclovir (VALTREX) 1000 MG tablet, Take 1 tablet by mouth once daily, Disp: 90 tablet, Rfl: 0    Past Medical History:   Diagnosis Date   • Dry eye    • Eczema    • Fibrocystic breast changes, bilateral    • GVH (graft versus host disease) (HCC)    • History of recurrent UTIs    • Hypothyroidism    • Leukemia (HCC)    • Migraine syndrome    • Osteopenia    • Post-menopause on HRT (hormone replacement therapy)    • Rash in adult         Past Surgical History:   Procedure Laterality Date   • BONE MARROW TRANSPLANT  12/2009   • D & C HYSTEROSCOPY      polypectomy   • LAPAROSCOPIC ASSISTED VAGINAL HYSTERECTOMY     • LAPAROSCOPY WITH LASER     • PORTACATH PLACEMENT         Objective  /78   Ht 165.1 cm (65\")   Wt 62.3 kg (137 lb 6.4 oz)   Breastfeeding No   BMI 22.86 kg/m²     Physical Exam  Exam conducted with a chaperone present.   Constitutional:       Appearance: Normal appearance.   HENT:      Head: Normocephalic.   Neck:      Thyroid: No thyroid mass or thyromegaly.   Cardiovascular:      Rate and Rhythm: Normal rate and regular rhythm.      Heart sounds: Normal heart sounds.   Pulmonary:      Effort: Pulmonary effort is normal.      Breath sounds: Normal breath sounds.   Chest:   Breasts:      Right: No mass, nipple discharge, skin change, axillary adenopathy or supraclavicular adenopathy.      Left: No mass, nipple discharge, skin change, axillary adenopathy or supraclavicular adenopathy.       Abdominal:      Palpations: Abdomen is soft. There is no mass.      Tenderness: There is no abdominal tenderness.   Genitourinary:     General: Normal vulva.    "   Labia:         Right: No lesion.         Left: No lesion.       Vagina: No erythema.      Uterus: Absent.       Adnexa: Left adnexa normal.        Right: No mass or tenderness.          Left: No mass or tenderness.        Comments: Vagina is pink / appears well estrogenized.  Anus appears wnl.  No rectal exam performed.  Lymphadenopathy:      Upper Body:      Right upper body: No supraclavicular or axillary adenopathy.      Left upper body: No supraclavicular or axillary adenopathy.   Neurological:      Mental Status: She is alert.         Assessment/Plan   Diagnoses and all orders for this visit:    1. Encounter for gynecological examination without abnormal finding (Primary)    2. Hormone replacement therapy    3. Menopause  -     DEXA Bone Density Axial; Future    4. Screening for osteoporosis    Other orders  -     estradiol (Estrace) 1 MG tablet; Take 1.5 tablets by mouth Daily.  Dispense: 45 tablet; Refill: 12    Couns re: SBE, mammo & pvt health procedures, healthy eating and exercise.  Also re: Ca, Vit D, and walking/ wt bearing exercise for BMD.    Procedures        Return in about 1 year (around 2/21/2023) for Annual physical.    Mare Lewis, APRN  02/21/2022

## 2022-02-25 RX ORDER — LEVOTHYROXINE SODIUM 0.07 MG/1
TABLET ORAL
Qty: 90 TABLET | Refills: 0 | Status: SHIPPED | OUTPATIENT
Start: 2022-02-25 | End: 2022-03-28 | Stop reason: SDUPTHER

## 2022-03-01 ENCOUNTER — TELEPHONE (OUTPATIENT)
Dept: OBSTETRICS AND GYNECOLOGY | Facility: CLINIC | Age: 52
End: 2022-03-01

## 2022-03-01 NOTE — TELEPHONE ENCOUNTER
Per Maribel, patient has been advised that she needs 1200 mg of calcium daily (dietary or supplemental) and 2000 IU of Vitamin D.  She voiced understanding.

## 2022-03-14 ENCOUNTER — OFFICE VISIT (OUTPATIENT)
Dept: FAMILY MEDICINE CLINIC | Facility: CLINIC | Age: 52
End: 2022-03-14

## 2022-03-14 VITALS
SYSTOLIC BLOOD PRESSURE: 110 MMHG | BODY MASS INDEX: 22.99 KG/M2 | RESPIRATION RATE: 18 BRPM | OXYGEN SATURATION: 99 % | DIASTOLIC BLOOD PRESSURE: 68 MMHG | HEIGHT: 65 IN | TEMPERATURE: 97.8 F | HEART RATE: 94 BPM | WEIGHT: 138 LBS

## 2022-03-14 DIAGNOSIS — J01.00 ACUTE NON-RECURRENT MAXILLARY SINUSITIS: ICD-10-CM

## 2022-03-14 DIAGNOSIS — R05.9 COUGH: Primary | ICD-10-CM

## 2022-03-14 PROCEDURE — 99213 OFFICE O/P EST LOW 20 MIN: CPT | Performed by: FAMILY MEDICINE

## 2022-03-14 RX ORDER — AZITHROMYCIN 250 MG/1
TABLET, FILM COATED ORAL
Qty: 6 TABLET | Refills: 0 | Status: SHIPPED | OUTPATIENT
Start: 2022-03-14 | End: 2022-03-28

## 2022-03-14 NOTE — PROGRESS NOTES
"Chief Complaint  Cough and Nasal Congestion    Subjective          Ivis Mcginnis presents to [facility]  History of Present Illness     Cough  The patient presents today stating that she is not feeling well. She reports that her symptoms began approximately 1 week ago. She states that her symptoms began as a sore throat, nasal congestion, and a cough. The patient reports that her cough began approximately 8 days ago. She states that her symptoms worsened on Thursday and Friday. The patient reports that she coughing up mucus which is green in color. She states that she feels like her head is full. The patient reports that she is having a very hard time sleeping due to her symptoms. She states that she took her temperature 1 day this weekend and it was 98.9 degrees Fahrenheit. The patient reports that she is a . She has been around a significant amount of people who were sick. She states that she has received 2 Moderna COVID-19 vaccines. The patient reports that she has not been on Dupixent for a long time. She states that she has had a loss of taste and smell. The patient denies any ear pain. She states that her symptoms are worse when she lays down at night. The patient reports that she has taken a Z-Cipriano in the past and denies any trouble with it. She states that she takes Tylenol and ibuprofen for her symptoms. The patient reports that she continues to take Claritin D. The patient reports that she had a COVID-19 in 01/2022.      Review of Systems   HENT: Positive for congestion.    Respiratory: Positive for cough.    Cardiovascular: Negative.    Gastrointestinal: Negative.         Objective       Vital Signs:   /68   Pulse 94   Temp 97.8 °F (36.6 °C)   Resp 18   Ht 165.1 cm (65\")   Wt 62.6 kg (138 lb)   SpO2 99%   BMI 22.96 kg/m²     Physical Exam  Vitals and nursing note reviewed.   Constitutional:       Appearance: She is well-developed.   HENT:      " Head: Normocephalic and atraumatic.      Right Ear: Hearing and external ear normal.      Left Ear: Hearing and external ear normal.   Eyes:      Conjunctiva/sclera: Conjunctivae normal.      Pupils: Pupils are equal, round, and reactive to light.   Cardiovascular:      Rate and Rhythm: Normal rate and regular rhythm.      Heart sounds: Normal heart sounds. No murmur heard.    No friction rub.   Pulmonary:      Effort: Pulmonary effort is normal. No respiratory distress.      Breath sounds: Normal breath sounds. No wheezing or rales.   Lymphadenopathy:      Cervical: No cervical adenopathy.   Skin:     General: Skin is warm.   Neurological:      Mental Status: She is alert.   Psychiatric:         Behavior: Behavior normal.          Result Review :                     Assessment and Plan    Diagnoses and all orders for this visit:    1. Cough (Primary)  -     COVID-19,[facility] ROUTINE, NP/OP SWAB IN TRANSPORT MEDIA OR ESWAB 72 HR TAT - Swab, Anterior nasal.  - She has mentioned some loss of taste and smell, so we will go ahead and check a COVID-19 test. She is day 8, so in the meantime, she will stay off work until the COVID-19 test is back, hopefully will be back by tomorrow. She will continue over the counter symptomatic relief.    2. Acute non-recurrent maxillary sinusitis  -     azithromycin (Zithromax) 250 MG tablet; Take 2 tablets the first day, then 1 tablet daily for 4 days.  Dispense: 6 tablet; Refill: 0  - We will go ahead and treat as a sinus infection. She will start Z-Cipriano as noted. She will increase fluids and rest. She will call or follow up if not improving.          DISCUSSION          Follow Up   Return if symptoms worsen or fail to improve.    Patient was given instructions and counseling regarding her condition or for health maintenance advice. Please see specific information pulled into the AVS if appropriate.       Kiel Louis MD     Transcribed from ambient dictation for Kiel Louis MD by  Shelia Mcclelland.  03/14/22   13:56 EDT    Patient verbalized consent to the visit recording.  I have personally performed the services described in this document as transcribed by the above individual, and it is both accurate and complete.  Kiel Louis MD  3/14/2022  18:56 EDT

## 2022-03-15 LAB
LABCORP SARS-COV-2, NAA 2 DAY TAT: NORMAL
SARS-COV-2 RNA RESP QL NAA+PROBE: NOT DETECTED

## 2022-03-21 RX ORDER — VALACYCLOVIR HYDROCHLORIDE 1 G/1
TABLET, FILM COATED ORAL
Qty: 90 TABLET | Refills: 0 | Status: SHIPPED | OUTPATIENT
Start: 2022-03-21 | End: 2022-06-20

## 2022-03-28 ENCOUNTER — OFFICE VISIT (OUTPATIENT)
Dept: ENDOCRINOLOGY | Facility: CLINIC | Age: 52
End: 2022-03-28

## 2022-03-28 ENCOUNTER — LAB (OUTPATIENT)
Dept: LAB | Facility: HOSPITAL | Age: 52
End: 2022-03-28

## 2022-03-28 VITALS
DIASTOLIC BLOOD PRESSURE: 82 MMHG | SYSTOLIC BLOOD PRESSURE: 122 MMHG | HEART RATE: 58 BPM | WEIGHT: 139 LBS | RESPIRATION RATE: 16 BRPM | BODY MASS INDEX: 23.16 KG/M2 | OXYGEN SATURATION: 99 % | HEIGHT: 65 IN

## 2022-03-28 DIAGNOSIS — E03.9 ACQUIRED HYPOTHYROIDISM: Chronic | ICD-10-CM

## 2022-03-28 DIAGNOSIS — E03.9 ACQUIRED HYPOTHYROIDISM: Primary | Chronic | ICD-10-CM

## 2022-03-28 PROCEDURE — 99213 OFFICE O/P EST LOW 20 MIN: CPT | Performed by: INTERNAL MEDICINE

## 2022-03-28 PROCEDURE — 84443 ASSAY THYROID STIM HORMONE: CPT

## 2022-03-28 RX ORDER — LEVOTHYROXINE SODIUM 0.07 MG/1
75 TABLET ORAL DAILY
Qty: 90 TABLET | Refills: 3 | Status: SHIPPED | OUTPATIENT
Start: 2022-03-28 | End: 2022-06-06

## 2022-03-28 NOTE — PROGRESS NOTES
"     Office Note      Date: 2022  Patient Name: Ivis Mcginnis  MRN: [id number]  : 1970    Chief Complaint   Patient presents with   • Follow-up   • Hypothyroidism     6 month        History of Present Illness:   Ivis Mcginnis is a 51 y.o. female who presents for Follow-up and Hypothyroidism (6 month )    She remains on T4 75mcg qd. She is taking this correctly. She isn't taking any interfering meds concurrently. However she is taking biotin supplement.  She hasn't noted any change in the size of her neck. She denies any compressive sxs. She denies any sxs of hypo- or hyperthyroidism at this time, aside from fatigue.  The fatigue comes and goes.    Subjective      Review of Systems:   Review of Systems   Constitutional: Positive for fatigue.   Cardiovascular: Negative.    Gastrointestinal: Positive for constipation.   Endocrine: Negative.        The following portions of the patient's history were reviewed and updated as appropriate: allergies, current medications, past family history, past medical history, past social history, past surgical history and problem list.    Objective     Visit Vitals  /82 (BP Location: Left arm, Patient Position: Sitting, Cuff Size: Adult)   Pulse 58   Resp 16   Ht 165.1 cm (65\")   Wt 63 kg (139 lb)   SpO2 99%   Breastfeeding No   BMI 23.13 kg/m²       Physical Exam:  Physical Exam  Constitutional:       Appearance: Normal appearance.   Neck:      Thyroid: No thyroid mass, thyromegaly or thyroid tenderness.   Lymphadenopathy:      Cervical: No cervical adenopathy.   Neurological:      Mental Status: She is alert.         Labs:    TSH  No results found for: TSHBASE     Free T4  Free T4   Date Value Ref Range Status   2021 1.39 0.93 - 1.70 ng/dL Final       T3  No results found for: S1QNLJL      TPO  No results found for: THYROIDAB    TG AB  No results found for: THGAB    TG  No results found for: THYROGLB    CBC w/DIFF  Lab Results   Component Value Date    WBC " 4.64 11/03/2020    RBC 3.59 (L) 11/03/2020    HGB 13.3 11/03/2020    HCT 37.4 11/03/2020    .2 (H) 11/03/2020    MCH 37.0 (H) 11/03/2020    MCHC 35.6 11/03/2020    RDW 11.5 (L) 11/03/2020     11/03/2020    NEUTRORELPCT 43.4 11/03/2020    LYMPHORELPCT 43.5 11/03/2020    MONORELPCT 9.1 11/03/2020    EOSRELPCT 3.2 11/03/2020    BASORELPCT 0.6 11/03/2020    NEUTROABS 2.01 11/03/2020    LYMPHSABS 2.02 11/03/2020    MONOSABS 0.42 11/03/2020    EOSABS 0.15 11/03/2020    BASOSABS 0.03 11/03/2020    NRBC 0.0 11/03/2020           Assessment / Plan      Assessment & Plan:  Diagnoses and all orders for this visit:    1. Acquired hypothyroidism (Primary)  Assessment & Plan:  Continue T4.  Check TSH today.  Will send note about results.    Orders:  -     TSH; Future    Other orders  -     levothyroxine (SYNTHROID, LEVOTHROID) 75 MCG tablet; Take 1 tablet by mouth Daily.  Dispense: 90 tablet; Refill: 3      Return in about 6 months (around 9/28/2022) for Recheck with TSH.    Trey Higuera MD   03/28/2022

## 2022-03-29 LAB — TSH SERPL DL<=0.05 MIU/L-ACNC: 3.35 UIU/ML (ref 0.27–4.2)

## 2022-05-17 ENCOUNTER — HOSPITAL ENCOUNTER (OUTPATIENT)
Dept: BONE DENSITY | Facility: HOSPITAL | Age: 52
Discharge: HOME | End: 2022-05-17
Admitting: NURSE PRACTITIONER

## 2022-05-17 DIAGNOSIS — Z78.0 MENOPAUSE: ICD-10-CM

## 2022-05-17 PROCEDURE — 77080 DXA BONE DENSITY AXIAL: CPT

## 2022-05-23 ENCOUNTER — TELEPHONE (OUTPATIENT)
Dept: OBSTETRICS AND GYNECOLOGY | Facility: CLINIC | Age: 52
End: 2022-05-23

## 2022-05-23 NOTE — TELEPHONE ENCOUNTER
We discussed the most recent DEXA scan via phone.  And also the comparison between recent & the previous scan from 6/28/2017  (improved osteopenia).  Encouraged re: calcium, vit D, and walking / wt bearing exercise.  Adv to repeat in 2 years.

## 2022-06-06 RX ORDER — LEVOTHYROXINE SODIUM 0.07 MG/1
TABLET ORAL
Qty: 90 TABLET | Refills: 1 | Status: SHIPPED | OUTPATIENT
Start: 2022-06-06 | End: 2022-06-06 | Stop reason: SDUPTHER

## 2022-06-06 RX ORDER — LEVOTHYROXINE SODIUM 0.07 MG/1
75 TABLET ORAL DAILY
Qty: 90 TABLET | Refills: 1 | Status: SHIPPED | OUTPATIENT
Start: 2022-06-06

## 2022-06-06 NOTE — TELEPHONE ENCOUNTER
PT CALLED STATING SHE IS OF OF HER THYROIC MED AND REQUESTED WE SEND IN A REFILL. SHE TAKES LEVOTHYROXINE TO BE SENT IN TO [facility]

## 2022-06-20 RX ORDER — VALACYCLOVIR HYDROCHLORIDE 1 G/1
TABLET, FILM COATED ORAL
Qty: 90 TABLET | Refills: 0 | Status: SHIPPED | OUTPATIENT
Start: 2022-06-20 | End: 2022-08-23

## 2022-07-05 RX ORDER — NITROFURANTOIN 25; 75 MG/1; MG/1
CAPSULE ORAL
Qty: 14 CAPSULE | Refills: 0 | OUTPATIENT
Start: 2022-07-05

## 2022-07-06 ENCOUNTER — OFFICE VISIT (OUTPATIENT)
Dept: FAMILY MEDICINE CLINIC | Facility: CLINIC | Age: 52
End: 2022-07-06

## 2022-07-06 VITALS
HEIGHT: 65 IN | HEART RATE: 68 BPM | WEIGHT: 139 LBS | TEMPERATURE: 98.1 F | OXYGEN SATURATION: 100 % | BODY MASS INDEX: 23.16 KG/M2 | SYSTOLIC BLOOD PRESSURE: 104 MMHG | DIASTOLIC BLOOD PRESSURE: 66 MMHG | RESPIRATION RATE: 14 BRPM

## 2022-07-06 DIAGNOSIS — R30.0 DYSURIA: ICD-10-CM

## 2022-07-06 DIAGNOSIS — N30.00 ACUTE CYSTITIS WITHOUT HEMATURIA: Primary | ICD-10-CM

## 2022-07-06 LAB
BILIRUB BLD-MCNC: NEGATIVE MG/DL
CLARITY, POC: CLEAR
COLOR UR: YELLOW
EXPIRATION DATE: NORMAL
GLUCOSE UR STRIP-MCNC: NEGATIVE MG/DL
KETONES UR QL: NEGATIVE
LEUKOCYTE EST, POC: NEGATIVE
Lab: NORMAL
NITRITE UR-MCNC: NEGATIVE MG/ML
PH UR: 6 [PH] (ref 5–8)
PROT UR STRIP-MCNC: NEGATIVE MG/DL
RBC # UR STRIP: NEGATIVE /UL
SP GR UR: 1.02 (ref 1–1.03)
UROBILINOGEN UR QL: NORMAL

## 2022-07-06 PROCEDURE — 81003 URINALYSIS AUTO W/O SCOPE: CPT | Performed by: FAMILY MEDICINE

## 2022-07-06 PROCEDURE — 99213 OFFICE O/P EST LOW 20 MIN: CPT | Performed by: FAMILY MEDICINE

## 2022-07-06 RX ORDER — NITROFURANTOIN MACROCRYSTALS 100 MG/1
100 CAPSULE ORAL
COMMUNITY
End: 2022-07-11 | Stop reason: SDUPTHER

## 2022-07-06 RX ORDER — SULFAMETHOXAZOLE AND TRIMETHOPRIM 800; 160 MG/1; MG/1
1 TABLET ORAL 2 TIMES DAILY
Qty: 14 TABLET | Refills: 0 | Status: SHIPPED | OUTPATIENT
Start: 2022-07-06 | End: 2023-03-15

## 2022-07-06 NOTE — PROGRESS NOTES
"Chief Complaint  Burning w/ urination  (X Sun night ) and Urinary Frequency    Subjective          Ivis Mcginnis presents to [facility]  Urinary Tract Infection   This is a new problem. The problem occurs intermittently. Associated symptoms include frequency and urgency. Pertinent negatives include no chills. Associated symptoms comments: Fatigue, back pain  . Treatments tried: Macrobid 1 qd x 3 days. The treatment provided mild relief.         The following portions of the patient's history were reviewed and updated as appropriate: allergies, current medications, past family history, past medical history, past social history, past surgical history and problem list.    Objective   Vital Signs:   /66   Pulse 68   Temp 98.1 °F (36.7 °C)   Resp 14   Ht 165.1 cm (65\")   Wt 63 kg (139 lb)   SpO2 100%   BMI 23.13 kg/m²     Physical Exam  Vitals and nursing note reviewed.   Constitutional:       Appearance: Normal appearance. She is well-developed. She is not ill-appearing.   HENT:      Head: Normocephalic and atraumatic.      Right Ear: External ear normal.      Left Ear: External ear normal.   Eyes:      Conjunctiva/sclera: Conjunctivae normal.   Pulmonary:      Effort: Pulmonary effort is normal. No respiratory distress.   Abdominal:      Palpations: Abdomen is soft.      Tenderness: There is no abdominal tenderness. There is no right CVA tenderness or left CVA tenderness.   Skin:     General: Skin is warm.   Neurological:      Mental Status: She is alert and oriented to person, place, and time.   Psychiatric:         Behavior: Behavior normal.        Result Review :                 Assessment and Plan    Diagnoses and all orders for this visit:    1. Acute cystitis without hematuria (Primary)  -     Urine Culture - , Urine, Clean Catch  -     sulfamethoxazole-trimethoprim (Bactrim DS) 800-160 MG per tablet; Take 1 tablet by mouth 2 (Two) Times a Day.  Dispense: 14 tablet; " Refill: 0    2. Dysuria  -     POCT urinalysis dipstick, automated  -     Urine Culture - , Urine, Clean Catch    UA normal, however she has taken 3 doses of Macrobid. Will culture urine  Start Bactrim for treatment       Follow Up   Return if symptoms worsen or fail to improve.  Patient was given instructions and counseling regarding her condition or for health maintenance advice. Please see specific information pulled into the AVS if appropriate.

## 2022-07-08 ENCOUNTER — TELEPHONE (OUTPATIENT)
Dept: OBSTETRICS AND GYNECOLOGY | Facility: CLINIC | Age: 52
End: 2022-07-08

## 2022-07-08 ENCOUNTER — TELEPHONE (OUTPATIENT)
Dept: FAMILY MEDICINE CLINIC | Facility: CLINIC | Age: 52
End: 2022-07-08

## 2022-07-08 LAB
BACTERIA UR CULT: NORMAL
BACTERIA UR CULT: NORMAL

## 2022-07-08 RX ORDER — NITROFURANTOIN 25; 75 MG/1; MG/1
CAPSULE ORAL
Qty: 14 CAPSULE | Refills: 0 | Status: SHIPPED | OUTPATIENT
Start: 2022-07-08 | End: 2022-07-11

## 2022-07-08 NOTE — TELEPHONE ENCOUNTER
PT CALLED BACK STATES THE REFILL IS FOR NITOFURANTOIN - DR BEACH HAD PREVIOUSLY GAVE HER A PRESCRIPTION TO TAKE AFTER INTERCOURSE-- SHE HAS ISSUES WITH BLADDER INFECTIONS OS THIS IS WHY SHE WANTS TO REFILL AND KEEP ON HAND

## 2022-07-08 NOTE — TELEPHONE ENCOUNTER
Rx Refill Note  Patient is calling today to request refill on Macrobid 100 mg.  She takes one tablet at the time of intercourse.  Dr. Avila previously prescribed this for the patient, and she states that it has been beneficial in reducing UTI's following intercourse.    If appropriate, please send prescription with refills to [facility] pharmacy (90 day with refills).  Requested Prescriptions      No prescriptions requested or ordered in this encounter      Last office visit with prescribing clinician: 2/21/2022      Next office visit with prescribing clinician: 3/15/2023            Suzanne Tong MA  07/08/22, 13:44 EDT

## 2022-07-08 NOTE — TELEPHONE ENCOUNTER
Called attempting to reach patient; no answer, LVM requesting call back and provided office call back number.    Unsure of what Rx she is referring to - Nothing on file from [facility] since her Estradiol on 2/21/22 which appears to be a year supply and was approved/sent in.

## 2022-07-08 NOTE — TELEPHONE ENCOUNTER
Caller: Ivis Mcginnis    Relationship: Self    Best call back number: [phone]    What test was performed: LABS    When was the test performed: 07/06/22    Where was the test performed: OFFICE

## 2022-07-10 DIAGNOSIS — R53.83 OTHER FATIGUE: Primary | ICD-10-CM

## 2022-07-10 DIAGNOSIS — M54.50 ACUTE BILATERAL LOW BACK PAIN WITHOUT SCIATICA: ICD-10-CM

## 2022-07-11 ENCOUNTER — TELEPHONE (OUTPATIENT)
Dept: ENDOCRINOLOGY | Facility: CLINIC | Age: 52
End: 2022-07-11

## 2022-07-11 RX ORDER — NITROFURANTOIN MACROCRYSTALS 100 MG/1
100 CAPSULE ORAL NIGHTLY
Qty: 30 CAPSULE | Refills: 10 | Status: SHIPPED | OUTPATIENT
Start: 2022-07-11

## 2022-07-11 NOTE — TELEPHONE ENCOUNTER
PT HAS AN APPT ON 9/29/22 TO SEE DR BROOKS  SHE THINKS HER THYROID IS OFF RIGHT NOW CAN SHE GET HER LABS DRAWN NOW  PT WOULD HAVE LABS DONE AT [facility]  IN [place]  PTS NUMBER  [phone]

## 2022-07-13 ENCOUNTER — LAB (OUTPATIENT)
Dept: LAB | Facility: HOSPITAL | Age: 52
End: 2022-07-13

## 2022-07-13 PROCEDURE — 80050 GENERAL HEALTH PANEL: CPT | Performed by: FAMILY MEDICINE

## 2022-07-13 PROCEDURE — 82607 VITAMIN B-12: CPT | Performed by: FAMILY MEDICINE

## 2022-07-13 PROCEDURE — 82306 VITAMIN D 25 HYDROXY: CPT | Performed by: FAMILY MEDICINE

## 2022-07-15 ENCOUNTER — TELEPHONE (OUTPATIENT)
Dept: FAMILY MEDICINE CLINIC | Facility: CLINIC | Age: 52
End: 2022-07-15

## 2022-07-15 NOTE — TELEPHONE ENCOUNTER
PT INFORMED. PT WOULD LIKE TO DISCUSS KIDNEY FUNCTION AND RBC. PT STATES THAT SHE HASNT BEEN TAKING ANY NSAIDs. Would like some information on the folate to take.

## 2022-07-15 NOTE — TELEPHONE ENCOUNTER
----- Message from Leda Burns, DO sent at 7/14/2022  6:50 PM EDT -----  Normal vitamin D, normal thyroid function, electrolytes, liver enzymes, and she is not anemic  Vitamin B12 is elevated by 2 points, this is insignificant and should not cause issue  Kidney function is diminished.  Has she been taking NSAIDs recently? If so, advise to stop. Recheck BMP in 1-2 week to monitor renal function. Be well hydrated prior to labs.   RBC size is large, please add folate.

## 2022-08-23 RX ORDER — VALACYCLOVIR HYDROCHLORIDE 1 G/1
TABLET, FILM COATED ORAL
Qty: 90 TABLET | Refills: 0 | Status: SHIPPED | OUTPATIENT
Start: 2022-08-23 | End: 2022-12-27

## 2022-08-23 RX ORDER — TOPIRAMATE 50 MG/1
TABLET, FILM COATED ORAL
Qty: 60 TABLET | Refills: 0 | Status: SHIPPED | OUTPATIENT
Start: 2022-08-23 | End: 2022-11-21

## 2022-09-21 ENCOUNTER — TELEPHONE (OUTPATIENT)
Dept: FAMILY MEDICINE CLINIC | Facility: CLINIC | Age: 52
End: 2022-09-21

## 2022-09-21 NOTE — TELEPHONE ENCOUNTER
Pt informed  Folate add on lab was not added on by [facility] and she can come in anytime to have this done-folate lab

## 2022-09-29 ENCOUNTER — LAB (OUTPATIENT)
Dept: LAB | Facility: HOSPITAL | Age: 52
End: 2022-09-29

## 2022-09-29 ENCOUNTER — OFFICE VISIT (OUTPATIENT)
Dept: ENDOCRINOLOGY | Facility: CLINIC | Age: 52
End: 2022-09-29

## 2022-09-29 VITALS
HEIGHT: 65 IN | SYSTOLIC BLOOD PRESSURE: 120 MMHG | OXYGEN SATURATION: 99 % | DIASTOLIC BLOOD PRESSURE: 73 MMHG | HEART RATE: 85 BPM | WEIGHT: 138 LBS | BODY MASS INDEX: 22.99 KG/M2

## 2022-09-29 DIAGNOSIS — E03.9 ACQUIRED HYPOTHYROIDISM: Chronic | ICD-10-CM

## 2022-09-29 DIAGNOSIS — E03.9 ACQUIRED HYPOTHYROIDISM: Primary | Chronic | ICD-10-CM

## 2022-09-29 PROCEDURE — 84443 ASSAY THYROID STIM HORMONE: CPT

## 2022-09-29 PROCEDURE — 99213 OFFICE O/P EST LOW 20 MIN: CPT | Performed by: INTERNAL MEDICINE

## 2022-09-29 PROCEDURE — 80053 COMPREHEN METABOLIC PANEL: CPT

## 2022-09-29 NOTE — PROGRESS NOTES
"     Office Note      Date: 2022  Patient Name: Ivis Mcginnis  MRN: [id number]  : 1970    Chief Complaint   Patient presents with   • Hypothyroidism       History of Present Illness:   Ivis Mcginnis is a 52 y.o. female who presents for Hypothyroidism    She remains on T4 75mcg qd. She is taking this correctly. She isn't taking any interfering meds concurrently. However, she is taking biotin supplement.  She hasn't noted any change in the size of her neck. She denies any compressive sxs. She denies any sxs of hypo- or hyperthyroidism at this time, aside from fatigue.  The fatigue comes and goes.    Subjective      Review of Systems:   Review of Systems   Constitutional: Positive for fatigue.   Cardiovascular: Negative.    Gastrointestinal: Positive for constipation.   Endocrine: Negative.        The following portions of the patient's history were reviewed and updated as appropriate: allergies, current medications, past family history, past medical history, past social history, past surgical history and problem list.    Objective     Visit Vitals  /73   Pulse 85   Ht 165.1 cm (65\")   Wt 62.6 kg (138 lb)   SpO2 99%   BMI 22.96 kg/m²       Physical Exam:  Physical Exam  Constitutional:       Appearance: Normal appearance.   Neck:      Thyroid: No thyroid mass, thyromegaly or thyroid tenderness.   Lymphadenopathy:      Cervical: No cervical adenopathy.   Neurological:      Mental Status: She is alert.         Labs:    TSH  No results found for: TSHBASE     Free T4  Free T4   Date Value Ref Range Status   2021 1.39 0.93 - 1.70 ng/dL Final       T3  No results found for: G8FIACI      TPO  No results found for: THYROIDAB    TG AB  No results found for: THGAB    TG  No results found for: THYROGLB    CBC w/DIFF  Lab Results   Component Value Date    WBC 4.96 2022    RBC 3.75 (L) 2022    HGB 14.2 2022    HCT 40.2 2022    .2 (H) 2022    MCH 37.9 (H) 2022    " MCHC 35.3 07/13/2022    RDW 11.9 (L) 07/13/2022    RDWSD 46.9 07/13/2022    MPV 10.3 07/13/2022     07/13/2022    NEUTRORELPCT 47.0 08/04/2021    LYMPHORELPCT 43.0 08/04/2021    MONORELPCT 8.0 08/04/2021    EOSRELPCT 2.0 08/04/2021    BASORELPCT 0.0 08/04/2021    AUTOIGPER 0.0 08/04/2021    NEUTROABS 2.47 08/04/2021    LYMPHSABS 2.29 08/04/2021    MONOSABS 0.40 08/04/2021    EOSABS 0.13 08/04/2021    BASOSABS 0.02 08/04/2021    AUTOIGNUM 0.01 08/04/2021    NRBC 0.0 08/04/2021           Assessment / Plan      Assessment & Plan:  Diagnoses and all orders for this visit:    1. Acquired hypothyroidism (Primary)  Assessment & Plan:  Continue T4.  Check TSH today.    Last labs showed elevated Cr. Plan to recheck CMP today.    Orders:  -     TSH; Future  -     Comprehensive Metabolic Panel; Future      Return in about 6 months (around 3/29/2023) for Recheck with TSH.    Trey Higuera MD   09/29/2022

## 2022-09-30 LAB
ALBUMIN SERPL-MCNC: 4.6 G/DL (ref 3.5–5.2)
ALBUMIN/GLOB SERPL: 2.2 G/DL
ALP SERPL-CCNC: 44 U/L (ref 39–117)
ALT SERPL W P-5'-P-CCNC: 17 U/L (ref 1–33)
ANION GAP SERPL CALCULATED.3IONS-SCNC: 9.2 MMOL/L (ref 5–15)
AST SERPL-CCNC: 24 U/L (ref 1–32)
BILIRUB SERPL-MCNC: 0.3 MG/DL (ref 0–1.2)
BUN SERPL-MCNC: 14 MG/DL (ref 6–20)
BUN/CREAT SERPL: 19.4 (ref 7–25)
CALCIUM SPEC-SCNC: 9 MG/DL (ref 8.6–10.5)
CHLORIDE SERPL-SCNC: 103 MMOL/L (ref 98–107)
CO2 SERPL-SCNC: 26.8 MMOL/L (ref 22–29)
CREAT SERPL-MCNC: 0.72 MG/DL (ref 0.57–1)
EGFRCR SERPLBLD CKD-EPI 2021: 100.1 ML/MIN/1.73
GLOBULIN UR ELPH-MCNC: 2.1 GM/DL
GLUCOSE SERPL-MCNC: 76 MG/DL (ref 65–99)
POTASSIUM SERPL-SCNC: 4 MMOL/L (ref 3.5–5.2)
PROT SERPL-MCNC: 6.7 G/DL (ref 6–8.5)
SODIUM SERPL-SCNC: 139 MMOL/L (ref 136–145)
TSH SERPL DL<=0.05 MIU/L-ACNC: 1.86 UIU/ML (ref 0.27–4.2)

## 2022-11-21 RX ORDER — TOPIRAMATE 50 MG/1
TABLET, FILM COATED ORAL
Qty: 60 TABLET | Refills: 0 | Status: SHIPPED | OUTPATIENT
Start: 2022-11-21 | End: 2023-02-20 | Stop reason: SDUPTHER

## 2022-12-27 RX ORDER — VALACYCLOVIR HYDROCHLORIDE 1 G/1
TABLET, FILM COATED ORAL
Qty: 90 TABLET | Refills: 0 | Status: SHIPPED | OUTPATIENT
Start: 2022-12-27 | End: 2023-03-20

## 2023-02-06 RX ORDER — TOPIRAMATE 50 MG/1
TABLET, FILM COATED ORAL
Qty: 60 TABLET | Refills: 0 | OUTPATIENT
Start: 2023-02-06

## 2023-02-09 RX ORDER — TOPIRAMATE 50 MG/1
TABLET, FILM COATED ORAL
Qty: 60 TABLET | Refills: 0 | OUTPATIENT
Start: 2023-02-09

## 2023-02-20 DIAGNOSIS — G43.709 CHRONIC MIGRAINE WITHOUT AURA WITHOUT STATUS MIGRAINOSUS, NOT INTRACTABLE: Primary | ICD-10-CM

## 2023-02-20 NOTE — TELEPHONE ENCOUNTER
Caller: Ivis Mcginnis TIANNA    Relationship: Self    Best call back number: [phone]    Requested Prescriptions:   Requested Prescriptions     Pending Prescriptions Disp Refills   • topiramate (TOPAMAX) 50 MG tablet 60 tablet 0     Sig: Take 1 tablet by mouth 2 (Two) Times a Day.      Pharmacy where request should be sent: [id number] - [facility] - [place], [place] - [address] AT [facility] [phone] [facility] [phone] FX     Additional details provided by patient:     PATIENT SHE WILL BE OUT OF MEDICATION TODAY.    Does the patient have less than a 3 day supply:  [x] Yes  [] No    Would you like a call back once the refill request has been completed: [x] Yes [] No    If the office needs to give you a call back, can they leave a voicemail: [x] Yes [] No    Laurent Osborne Rep   02/20/23 11:55 EST

## 2023-02-21 RX ORDER — TOPIRAMATE 50 MG/1
50 TABLET, FILM COATED ORAL 2 TIMES DAILY
Qty: 60 TABLET | Refills: 0 | Status: SHIPPED | OUTPATIENT
Start: 2023-02-21

## 2023-03-15 ENCOUNTER — OFFICE VISIT (OUTPATIENT)
Dept: OBSTETRICS AND GYNECOLOGY | Facility: CLINIC | Age: 53
End: 2023-03-15
Payer: COMMERCIAL

## 2023-03-15 VITALS
HEIGHT: 65 IN | BODY MASS INDEX: 21.83 KG/M2 | WEIGHT: 131 LBS | SYSTOLIC BLOOD PRESSURE: 114 MMHG | DIASTOLIC BLOOD PRESSURE: 70 MMHG

## 2023-03-15 DIAGNOSIS — Z01.419 ENCOUNTER FOR GYNECOLOGICAL EXAMINATION WITHOUT ABNORMAL FINDING: Primary | ICD-10-CM

## 2023-03-15 DIAGNOSIS — R10.11 RUQ PAIN: ICD-10-CM

## 2023-03-15 DIAGNOSIS — Z79.890 POSTMENOPAUSAL HORMONE REPLACEMENT THERAPY: ICD-10-CM

## 2023-03-15 PROCEDURE — 99396 PREV VISIT EST AGE 40-64: CPT | Performed by: NURSE PRACTITIONER

## 2023-03-15 RX ORDER — LIFITEGRAST 50 MG/ML
SOLUTION/ DROPS OPHTHALMIC
COMMUNITY
Start: 2023-02-21

## 2023-03-15 RX ORDER — ESTRADIOL 1 MG/1
1.5 TABLET ORAL DAILY
Qty: 135 TABLET | Refills: 3 | Status: SHIPPED | OUTPATIENT
Start: 2023-03-15

## 2023-03-15 NOTE — PROGRESS NOTES
Chief Complaint  Ivis Mcginnis is a 52 y.o.  female presenting for Annual Exam and Med Refill (Estradiol 1 mg (1.5 mg) (#135 with refills).  [employer].)    History of Present Illness  Ivis is a very pleasant 51yo woman, , here for annual gyn exam and med refills on ERT.  This dose relieves her menopausal symptoms without any bothersome side effects.  She has FBD; last mammogram wnl 2022.  Hypothyroidism, compensated.  Leukemia still in remission.  See surgical hx below (she is s/p LAVH)  She has improved osteopenia.  She is intentional re: calcium and vitamin D intake.  She is having some sharp RUQ Abd pain / upper mid back pains--- we discussed that she will get in with PCP.  (She does still have a gallbladder.)  Preventive procedures up to date.      The following portions of the patient's history were reviewed and updated as appropriate: allergies, current medications, past family history, past medical history, past social history, past surgical history and problem list.    Allergies   Allergen Reactions   • Dezocine Other (See Comments)     Severe headache   • Pentamidine Unknown - Low Severity   • Piperacillin Sod-Tazobactam So      Zosin         Current Outpatient Medications:   •  estradiol (Estrace) 1 MG tablet, Take 1.5 tablets by mouth Daily., Disp: 135 tablet, Rfl: 3  •  ascorbic acid (VITAMIN C) 1000 MG tablet, Take 1,000 mg by mouth Daily., Disp: , Rfl:   •  Biotin 03522 MCG tablet, Take 1 tablet by mouth Daily., Disp: , Rfl:   •  Calcium Carb-Cholecalciferol (CALCIUM 600+D3 PO), Take 1 tablet by mouth Daily., Disp: , Rfl:   •  Cholecalciferol (VITAMIN D) 2000 UNITS capsule, Take  by mouth., Disp: , Rfl:   •  hydrocortisone 2.5 % ointment, APPLY TOPICALLY TWICE A DAY FOR 2 WEEKS, THEN TAKE 2 WEEKS OFF. REPEAT IF NECESSARY, Disp: , Rfl:   •  levothyroxine (SYNTHROID, LEVOTHROID) 75 MCG tablet, Take 1 tablet by mouth Daily., Disp: 90 tablet, Rfl: 1  •  Multiple Vitamins-Minerals  "(MULTIVITAMIN ADULT PO), Take 1 tablet by mouth Daily., Disp: , Rfl:   •  nitrofurantoin (MACRODANTIN) 100 MG capsule, Take 1 capsule by mouth Every Night. ONE CAPSULE BY MOUTH WITH ACT OF INTERCOURSE, Disp: 30 capsule, Rfl: 10  •  topiramate (TOPAMAX) 50 MG tablet, Take 1 tablet by mouth 2 (Two) Times a Day., Disp: 60 tablet, Rfl: 0  •  valACYclovir (VALTREX) 1000 MG tablet, Take 1 tablet by mouth once daily, Disp: 90 tablet, Rfl: 0  •  Xiidra 5 % ophthalmic solution, INSTILL 1 DROP TWICE DAILY INTO EACH EYE, Disp: , Rfl:     Past Medical History:   Diagnosis Date   • Dry eye    • Eczema    • Fibrocystic breast changes, bilateral    • GVH (graft versus host disease) (HCC)    • History of recurrent UTIs    • Hypothyroidism    • Leukemia (HCC)    • Migraine syndrome    • Osteopenia    • Post-menopause on HRT (hormone replacement therapy)    • Rash in adult         Past Surgical History:   Procedure Laterality Date   • BONE MARROW TRANSPLANT  12/2009   • D & C HYSTEROSCOPY      polypectomy   • LAPAROSCOPIC ASSISTED VAGINAL HYSTERECTOMY     • LAPAROSCOPY WITH LASER     • PORTACATH PLACEMENT         Objective  /70   Ht 165.1 cm (65\")   Wt 59.4 kg (131 lb)   Breastfeeding No   BMI 21.80 kg/m²     Physical Exam  Vitals and nursing note reviewed. Exam conducted with a chaperone present.   Constitutional:       General: She is not in acute distress.     Appearance: Normal appearance. She is normal weight. She is not ill-appearing.   HENT:      Head: Normocephalic.   Neck:      Thyroid: No thyroid mass or thyromegaly.   Cardiovascular:      Rate and Rhythm: Normal rate and regular rhythm.      Heart sounds: Normal heart sounds. No murmur heard.  Pulmonary:      Effort: Pulmonary effort is normal. No respiratory distress.      Breath sounds: Normal breath sounds.   Chest:   Breasts:     Right: No inverted nipple, mass or nipple discharge.      Left: No inverted nipple, mass or nipple discharge.   Abdominal:      " Palpations: Abdomen is soft. There is no mass.      Tenderness: There is abdominal tenderness in the right upper quadrant.      Comments: Slight tenderness in the RUQ.  She will call PCP right away.     Genitourinary:     General: Normal vulva.      Labia:         Right: No rash, tenderness or lesion.         Left: No rash, tenderness or lesion.       Vagina: No vaginal discharge or erythema.      Uterus: Absent.       Adnexa:         Right: No mass or tenderness.          Left: No mass or tenderness.        Comments: Anus appears wnl.  No rectal exam performed.  Lymphadenopathy:      Upper Body:      Right upper body: No supraclavicular or axillary adenopathy.      Left upper body: No supraclavicular or axillary adenopathy.   Skin:     General: Skin is warm and dry.   Neurological:      Mental Status: She is alert and oriented to person, place, and time.   Psychiatric:         Mood and Affect: Mood normal.         Behavior: Behavior normal.         Assessment/Plan   Diagnoses and all orders for this visit:    1. Encounter for gynecological examination without abnormal finding (Primary)    2. Postmenopausal hormone replacement therapy  -     estradiol (Estrace) 1 MG tablet; Take 1.5 tablets by mouth Daily.  Dispense: 135 tablet; Refill: 3    3. RUQ pain    To PCP re: intermittent sharp RUQ pains.    Procedures    40 to 64: Counseling/Anticipatory Guidance Discussed: nutrition, physical activity, screenings, self-breast exam and HRT risks & benefits    Return in about 1 year (around 3/15/2024) for Annual physical.    Mare Lewis, APRN  03/15/2023

## 2023-03-20 RX ORDER — VALACYCLOVIR HYDROCHLORIDE 1 G/1
TABLET, FILM COATED ORAL
Qty: 90 TABLET | Refills: 1 | Status: SHIPPED | OUTPATIENT
Start: 2023-03-20

## 2023-04-17 ENCOUNTER — HOSPITAL ENCOUNTER (OUTPATIENT)
Dept: GENERAL RADIOLOGY | Facility: HOSPITAL | Age: 53
Discharge: HOME | End: 2023-04-17
Admitting: PHYSICIAN ASSISTANT
Payer: COMMERCIAL

## 2023-04-17 ENCOUNTER — OFFICE VISIT (OUTPATIENT)
Dept: FAMILY MEDICINE CLINIC | Facility: CLINIC | Age: 53
End: 2023-04-17
Payer: COMMERCIAL

## 2023-04-17 VITALS
RESPIRATION RATE: 16 BRPM | DIASTOLIC BLOOD PRESSURE: 62 MMHG | SYSTOLIC BLOOD PRESSURE: 98 MMHG | OXYGEN SATURATION: 100 % | HEART RATE: 51 BPM | HEIGHT: 65 IN | WEIGHT: 133 LBS | BODY MASS INDEX: 22.16 KG/M2 | TEMPERATURE: 97.3 F

## 2023-04-17 DIAGNOSIS — M79.661 PAIN AND SWELLING OF RIGHT LOWER LEG: ICD-10-CM

## 2023-04-17 DIAGNOSIS — S89.91XA INJURY OF RIGHT LOWER LEG, INITIAL ENCOUNTER: Primary | ICD-10-CM

## 2023-04-17 DIAGNOSIS — M79.89 PAIN AND SWELLING OF RIGHT LOWER LEG: ICD-10-CM

## 2023-04-17 PROCEDURE — 73590 X-RAY EXAM OF LOWER LEG: CPT

## 2023-04-17 RX ORDER — IBUPROFEN 600 MG/1
600 TABLET ORAL EVERY 6 HOURS PRN
Qty: 40 TABLET | Refills: 0 | Status: SHIPPED | OUTPATIENT
Start: 2023-04-17

## 2023-04-17 NOTE — PROGRESS NOTES
"Subjective   Ivis Mcginnis is a 52 y.o. female.     History of Present Illness   Pt presents with CC of R leg pain   Tripped over raised lip on floor while in [employer] and fell forward onto hard floor on Tuesday 4/11/23  Pain is along tibial plateau and radiates down   Has noticed some swelling along this area as well. No bruising   Pt did report the injury to the store     No previous knee injuries    Iced once that night   And took ibuprofen     Was on feet a lot Saturday and walked at cheer competition     Pain has not improved and if anything has worsened since injury     Has had one episode where leg felt like it was going to buckle     Hard time walking down stairs      Bone density screening in the past showed osteopenia     AML. Had chemotherapy     The following portions of the patient's history were reviewed and updated as appropriate: allergies, current medications, past family history, past medical history, past social history, past surgical history and problem list.    Review of Systems  As noted per HPI     Objective    Blood pressure 98/62, pulse 51, temperature 97.3 °F (36.3 °C), resp. rate 16, height 165.1 cm (65\"), weight 60.3 kg (133 lb), SpO2 100 %, not currently breastfeeding.     Physical Exam  Vitals reviewed.   Constitutional:       Appearance: Normal appearance.   Cardiovascular:      Rate and Rhythm: Normal rate.   Pulmonary:      Effort: Pulmonary effort is normal.   Musculoskeletal:      Right lower leg: Swelling, tenderness and bony tenderness present.   Skin:     General: Skin is warm and dry.   Neurological:      Mental Status: She is alert and oriented to person, place, and time.   Psychiatric:         Mood and Affect: Mood normal.         Behavior: Behavior normal.         Assessment & Plan   Diagnoses and all orders for this visit:    1. Injury of right lower leg, initial encounter (Primary)  -     XR tibia fibula 2 vw right  -     ibuprofen (ADVIL,MOTRIN) 600 MG tablet; Take 1 " tablet by mouth Every 6 (Six) Hours As Needed for Mild Pain or Moderate Pain.  Dispense: 40 tablet; Refill: 0  -     Ambulatory Referral to Orthopedic Surgery    2. Pain and swelling of right lower leg  -     XR tibia fibula 2 vw right  -     ibuprofen (ADVIL,MOTRIN) 600 MG tablet; Take 1 tablet by mouth Every 6 (Six) Hours As Needed for Mild Pain or Moderate Pain.  Dispense: 40 tablet; Refill: 0  -     Ambulatory Referral to Orthopedic Surgery    proceed with XR as noted to rule out fracture   NSAID as directed. Rest, ice, and elevation  Referral to ortho if not improving

## 2023-04-21 ENCOUNTER — TELEPHONE (OUTPATIENT)
Dept: FAMILY MEDICINE CLINIC | Facility: CLINIC | Age: 53
End: 2023-04-21
Payer: COMMERCIAL

## 2023-04-21 NOTE — TELEPHONE ENCOUNTER
Caller: Ivis Mcginnis    Relationship: Self    Best call back number: [phone]    What specialty or service is being requested:     ORTHOPEDIC      Any additional details:     THINGS WITH RIGHT KNEE ARE NOT ANY BETTER. SOME THINGS ARE WORSE.  PATIENT SAW JEN 4/17

## 2023-04-23 DIAGNOSIS — G43.709 CHRONIC MIGRAINE WITHOUT AURA WITHOUT STATUS MIGRAINOSUS, NOT INTRACTABLE: ICD-10-CM

## 2023-04-24 RX ORDER — TOPIRAMATE 50 MG/1
TABLET, FILM COATED ORAL
Qty: 60 TABLET | Refills: 2 | Status: SHIPPED | OUTPATIENT
Start: 2023-04-24

## 2023-04-27 ENCOUNTER — OFFICE VISIT (OUTPATIENT)
Dept: ORTHOPEDIC SURGERY | Facility: CLINIC | Age: 53
End: 2023-04-27
Payer: COMMERCIAL

## 2023-04-27 VITALS
BODY MASS INDEX: 22.15 KG/M2 | HEIGHT: 65 IN | WEIGHT: 132.94 LBS | SYSTOLIC BLOOD PRESSURE: 100 MMHG | DIASTOLIC BLOOD PRESSURE: 70 MMHG

## 2023-04-27 DIAGNOSIS — S80.01XA CONTUSION OF RIGHT KNEE, INITIAL ENCOUNTER: ICD-10-CM

## 2023-04-27 DIAGNOSIS — M70.50 PES ANSERINE BURSITIS: Primary | ICD-10-CM

## 2023-04-27 NOTE — PROGRESS NOTES
----- Message from Iona Montero sent at 11/6/2017  1:17 PM CST -----  Regarding: update  Blayne giang,   Attempting to reach Xavier today, his mailbox is full with no answer.   Thanks  Iona  [phone]   Orthopaedic Clinic Note: New Patient    Chief Complaint   Patient presents with   • Right Leg - Pain        HPI    Ivis Mcginnis is a 52 y.o. female who presents with right tibia pain.  Onset: mechanical fall. The issue has been ongoing for 2 week(s). Pain is a 4/10 on the pain scale. Pain is described as dull. Associated symptoms include pain, swelling and stiffness. The pain is worse with climbing stairs and rising from seated position; ice and pain medication and/or NSAID improve the pain. Previous treatments have included: bracing and NSAIDS.    I have reviewed the following portions of the patient's history:History of Present Illness    Past Medical History:   Diagnosis Date   • Dry eye    • Eczema    • Fibrocystic breast changes, bilateral    • GVH (graft versus host disease)    • History of recurrent UTIs    • Hypothyroidism    • Leukemia    • Migraine syndrome    • Osteopenia    • Post-menopause on HRT (hormone replacement therapy)    • Rash in adult       Past Surgical History:   Procedure Laterality Date   • BONE MARROW TRANSPLANT  2009   • D & C HYSTEROSCOPY      polypectomy   • LAPAROSCOPIC ASSISTED VAGINAL HYSTERECTOMY     • LAPAROSCOPY WITH LASER     • PORTACATH PLACEMENT        Family History   Problem Relation Age of Onset   • Kidney disease Mother            • Hypertension Father    • Coronary artery disease Father      Social History     Socioeconomic History   • Marital status:    Tobacco Use   • Smoking status: Never   • Smokeless tobacco: Never   Vaping Use   • Vaping Use: Never used   Substance and Sexual Activity   • Alcohol use: Yes     Comment: socially   • Drug use: No   • Sexual activity: Yes     Partners: Male     Birth control/protection: Surgical, Post-menopausal     Comment:       Current Outpatient Medications on File Prior to Visit   Medication Sig Dispense Refill   • ascorbic acid (VITAMIN C) 1000 MG tablet Take 1 tablet by mouth Daily.     • Biotin 29276  "MCG tablet Take 1 tablet by mouth Daily.     • Calcium Carb-Cholecalciferol (CALCIUM 600+D3 PO) Take 1 tablet by mouth Daily.     • Cholecalciferol (VITAMIN D) 2000 UNITS capsule Take  by mouth.     • estradiol (Estrace) 1 MG tablet Take 1.5 tablets by mouth Daily. 135 tablet 3   • ibuprofen (ADVIL,MOTRIN) 600 MG tablet Take 1 tablet by mouth Every 6 (Six) Hours As Needed for Mild Pain or Moderate Pain. 40 tablet 0   • levothyroxine (SYNTHROID, LEVOTHROID) 75 MCG tablet Take 1 tablet by mouth Daily. 90 tablet 1   • Multiple Vitamins-Minerals (MULTIVITAMIN ADULT PO) Take 1 tablet by mouth Daily.     • topiramate (TOPAMAX) 50 MG tablet Take 1 tablet by mouth twice daily 60 tablet 2   • valACYclovir (VALTREX) 1000 MG tablet Take 1 tablet by mouth once daily 90 tablet 1     No current facility-administered medications on file prior to visit.      Allergies   Allergen Reactions   • Dezocine Other (See Comments)     Severe headache   • Pentamidine Unknown - Low Severity   • Piperacillin Sod-Tazobactam So      Zosin        Review of Systems   Constitutional: Negative.    HENT: Negative.    Eyes: Negative.    Respiratory: Negative.    Cardiovascular: Negative.    Gastrointestinal: Negative.    Endocrine: Negative.    Genitourinary: Negative.    Musculoskeletal: Positive for arthralgias.   Skin: Negative.    Allergic/Immunologic: Negative.    Neurological: Negative.    Hematological: Negative.    Psychiatric/Behavioral: Negative.         The patient's Review of Systems was personally reviewed and confirmed as accurate.    The following portions of the patient's history were reviewed and updated as appropriate: allergies, current medications, past family history, past medical history, past social history, past surgical history and problem list.    Physical Exam  Blood pressure 100/70, height 165.1 cm (65\"), weight 60.3 kg (132 lb 15 oz), not currently breastfeeding.    Body mass index is 22.12 kg/m².    GENERAL APPEARANCE: " awake, alert & oriented x 3, in no acute distress and well developed, well nourished  PSYCH: normal affect  LUNGS:  breathing nonlabored  EYES: sclera anicteric  CARDIOVASCULAR: palpable dorsalis pedis, palpable posterior tibial bilaterally. Capillary refill less than 2 seconds  EXTREMITIES: no clubbing, cyanosis  GAIT:  Normal          Right lower extremity Exam:   ----------  Hip Exam  ----------  FLEXION CONTRACTURE: None  FLEXION: 110 degrees  INTERNAL ROTATION: 20 degrees at 90 degrees of flexion   EXTERNAL ROTATION: 40 degrees at 90 degrees of flexion    PAIN WITH HIP MOTION: no  ----------  Knee Exam  ----------  ALIGNMENT: neutral, no varus or valgus deformity     RANGE OF MOTION:  Normal (0-120 degrees) with no extensor lag or flexion contracture  LIGAMENTOUS STABILITY:   stable to varus and valgus stress at terminal extension and 30 degrees without any evidence of laxity     STRENGTH:  5/5 knee flexion, extension. 5/5 ankle dorsiflexion and plantarflexion.     PAIN WITH PALPATION: Tender to palpation about pes bursa and tibial tubercle, denies medial or lateral joint line pain  KNEE EFFUSION: no  PAIN WITH KNEE ROM: Anteromedial tibia  PATELLAR CREPITUS: no  SPECIAL EXAM FINDINGS:  Negative patellar compression    REFLEXES:  PATELLAR 2+/4  ACHILLES 2+/4    CLONUS: negative  STRAIGHT LEG TEST:   negative    SENSATION TO LIGHT TOUCH:  DEEP PERONEAL/SUPERFICIAL PERONEAL/SURAL/SAPHENOUS/TIBIAL:   intact    EDEMA:  no  ERYTHEMA:  no  WOUNDS/INCISIONS: no overlying skin problems.      Left Lower Extremity Exam:   ----------  Hip Exam  ----------  FLEXION CONTRACTURE: None  FLEXION: 110 degrees  INTERNAL ROTATION: 20 degrees at 90 degrees of flexion   EXTERNAL ROTATION: 40 degrees at 90 degrees of flexion    PAIN WITH HIP MOTION: no  ----------  Knee Exam  ----------  ALIGNMENT: neutral, no varus or valgus deformity     RANGE OF MOTION:  Normal (0-120 degrees) with no extensor lag or flexion  contracture  LIGAMENTOUS STABILITY:   stable to varus and valgus stress at terminal extension and 30 degrees without any evidence of laxity     STRENGTH:  5/5 knee flexion, extension. 5/5 ankle dorsiflexion and plantarflexion.     PAIN WITH PALPATION: denies tenderness to palpation about the knee, denies medial or lateral joint line pain  KNEE EFFUSION: no  PAIN WITH KNEE ROM: no  PATELLAR CREPITUS: yes, asymptomatic  SPECIAL EXAM FINDINGS:  Negative patellar compression    REFLEXES:  PATELLAR 2+/4  ACHILLES 2+/4    CLONUS: negative  STRAIGHT LEG TEST:   negative    SENSATION TO LIGHT TOUCH:  DEEP PERONEAL/SUPERFICIAL PERONEAL/SURAL/SAPHENOUS/TIBIAL:   intact    EDEMA:  no  ERYTHEMA:  no  WOUNDS/INCISIONS: no overlying skin problems.    ______________________________________________________________________  ______________________________________________________________________    RADIOGRAPHIC FINDINGS:   Right tibia x-rays from 4/17/2023 were personally interpreted.  Radiographs demonstrate no acute bony injury or fracture.  No significant arthritic changes are seen in the right knee.  Otherwise normal bony alignment.    Assessment/Plan:   Diagnosis Plan   1. Pes anserine bursitis        2. Contusion of right knee, initial encounter          Patient suffering from a knee contusion of the right knee.  I discussed treatment options with her regarding her ongoing knee pain.  She has some pes bursitis as well as focal tenderness overlying the soft tissues of the proximal tibia indicative of contusion.  I discussed conservative treatment with anti-inflammatories, ice, rest.  She is agreeable to this as her symptoms are starting to improve.  If her symptoms fail to improve completely, pes bursa cortisone injection can be performed.  She is agreeable to conservative intervention.  Follow-up as needed.    Beto Terrell MD  04/27/23  15:13 EDT

## 2023-04-27 NOTE — LETTER
April 27, 2023       No Recipients    Patient: Ivis Mcginnis   YOB: 1970   Date of Visit: 4/27/2023       Dear GAYLE Freedman:    Thank you for referring Ivis Mcginnis to me for evaluation. Below are the relevant portions of my assessment and plan of care.    Encounter Diagnosis and Orders:  Diagnoses and all orders for this visit:    1. Pes anserine bursitis (Primary)    2. Contusion of right knee, initial encounter        If you have questions, please do not hesitate to call me. I look forward to following Ivis along with you.         Sincerely,        Beto Terrell MD        CC:   No Recipients

## 2023-05-30 RX ORDER — LEVOTHYROXINE SODIUM 0.07 MG/1
TABLET ORAL
Qty: 90 TABLET | Refills: 0 | Status: SHIPPED | OUTPATIENT
Start: 2023-05-30

## 2023-05-30 NOTE — TELEPHONE ENCOUNTER
Rx Refill Note  Requested Prescriptions     Pending Prescriptions Disp Refills   • levothyroxine (SYNTHROID, LEVOTHROID) 75 MCG tablet [Pharmacy Med Name: Levothyroxine Sodium 75 MCG Oral Tablet] 90 tablet 0     Sig: Take 1 tablet by mouth once daily      Last office visit with prescribing clinician: 9/29/2022   Last telemedicine visit with prescribing clinician: Visit date not found   Next office visit with prescribing clinician: 7/25/2023                         Shelia Cardenas CMA  05/30/23, 11:02 EDT

## 2023-06-05 NOTE — PROGRESS NOTES
"Nephrology Progress Note    Hospital course:   32-year-old  male with a history of polysubstance abuse and solitary kidney.  Patient had severe rhabdomyolysis and blood pressure 85/51 upon admission.  Patient did complain also of decreased urinary frequency.  Patient has acute nonoliguric renal failure and has required hemodialysis for clearance and some ultrafiltration.    Subjective:   Patient appears ill and weak.        Objective:   BP (!) 156/103   Pulse 73   Temp 97.3 °F (36.3 °C) (Axillary)   Resp 19   Ht 5' 7" (1.702 m)   Wt 69.4 kg (153 lb)   SpO2 99%   BMI 23.96 kg/m²     Intake/Output Summary (Last 24 hours) at 6/5/2023 1207  Last data filed at 6/5/2023 1107  Gross per 24 hour   Intake 2565.07 ml   Output 4190 ml   Net -1624.93 ml        Physical exam:   General:  Patient is alert on BiPAP  HEENT normocephalic atraumatic pupils equal round reactive to light  Neck: No JVD bruit thyromegaly adenopathy appreciated  Lungs:  Clear to auscultation bilaterally all fields  Cardiovascular regular rate and rhythm no murmur rub or gallop appreciated  Abdomen: Positive bowel sounds all 4 quadrants soft nontender nondistended  Extremities:  No clubbing cyanosis noted trace  Edema of lower extremities appreciated Neurologic no clonus asterixis appreciated    Laboratory data:   Recent Results (from the past 24 hour(s))   Vancomycin, Random    Collection Time: 06/04/23  2:46 PM   Result Value Ref Range    Vanc Lvl Random 21.2 (H) 15.0 - 20.0 ug/ml   PTT Heparin Monitoring    Collection Time: 06/04/23  4:54 PM   Result Value Ref Range    PTT Heparin Monitor 65.1 seconds   RT Blood Gas    Collection Time: 06/04/23  5:12 PM   Result Value Ref Range    Sample Type Arterial Blood     Sample site Arterial Line     Drawn by patricia     pH, Blood gas 7.400 7.350 - 7.450    pCO2, Blood gas 39.0 35.0 - 45.0 mmHg    pO2, Blood gas 74.0 (L) 75.0 - 100.0 mmHg    TOC2, Blood gas 25.4 22.0 - 26.0 mmol/L    Base Excess, " Subjective   Ivis Mcginnis is a 49 y.o. female.     History of Present Illness   UTI  Having urgency frequency of urination and some blood in her urine started late Tuesday also low back pain started last night  Took Azo yesterday.  Concerned that she has a UTI.  Has had negative urine cultures on the last 3 episodes of urine checked here in the office.  Denies fever or chills denies abdominal pain.  Patient was seen by Dr. Montaño June 2019 and diagnosed with interstitial cystitis.  He wanted to start her on medication but she declined due to side effects.    The following portions of the patient's history were reviewed and updated as appropriate: allergies, current medications, past family history, past medical history, past social history, past surgical history and problem list.    Review of Systems   Constitutional: Negative.  Negative for chills, fatigue and fever.   HENT: Negative.    Respiratory: Negative.    Cardiovascular: Negative.    Gastrointestinal: Negative.  Negative for abdominal pain.   Endocrine: Negative.    Genitourinary: Positive for dysuria, frequency, hematuria and urgency. Negative for difficulty urinating, flank pain and vaginal pain.   Musculoskeletal: Negative for back pain.   Skin: Negative.    Allergic/Immunologic: Negative.    Neurological: Negative.    Hematological: Negative.    Psychiatric/Behavioral: Negative.        Objective   Physical Exam   Constitutional: She is oriented to person, place, and time. She appears well-developed and well-nourished. No distress.   Eyes: Lids are normal.   Neck: No thyromegaly present.   Cardiovascular: Normal rate, regular rhythm and normal heart sounds.   Pulmonary/Chest: Effort normal and breath sounds normal.   Abdominal: Soft. Normal appearance and bowel sounds are normal. She exhibits no distension. There is no hepatosplenomegaly. There is no tenderness. There is no rigidity, no rebound, no guarding and no CVA tenderness. No hernia.    Neurological: She is alert and oriented to person, place, and time.   Skin: Skin is warm and dry. She is not diaphoretic.   Psychiatric: She has a normal mood and affect. Her behavior is normal. Judgment and thought content normal.   Nursing note and vitals reviewed.        Assessment/Plan   Ivis was seen today for difficulty urinating.    Diagnoses and all orders for this visit:    Urine leukocytes  -     Urine Culture - Urine, Urine, Clean Catch    Dysuria  -     POCT urinalysis dipstick, automated      UA positive for blood and trace of leukocytes  We will send for urine culture and notify patient of the results.  Advised patient to drink plenty of fluids and take AZO as needed for dysuria.  If urine culture positive for bacterial growth will call in antibiotics.  Discussed symptoms of interstitial cystitis and foods to avoid to help reduce symptoms such as caffeine, carbonated beverages, chocolate, tomatoes.  Patient agrees.        Blood gas -0.50 -2.00 - 2.00 mmol/L    sO2, Blood gas 96.9 >=90.0 %    HCO3, Blood gas 24.2 22.0 - 26.0 mmol/L    pAO2 664 mmHg    A-aDO2 590 mmHg    THb, Blood gas 12.0 12 - 18 g/dL    O2 Hb, Blood Gas 94.4 94.0 - 100.0 %    CO Hgb 1.6 (H) 0.5 - 1.5 %    Met Hgb 0.9 0 - 1.5 %    Allens Test N/A     MODE BiPAP     FIO2, Blood gas 100.0 %    BiPAP (I) 18 cm H2O    BiPAP (E) 12 cm H2O   APTT    Collection Time: 06/04/23 10:55 PM   Result Value Ref Range    PTT 42.7 <150.0 seconds   Comprehensive Metabolic Panel (CMP)    Collection Time: 06/05/23  4:07 AM   Result Value Ref Range    Sodium Level 138 136 - 145 mmol/L    Potassium Level 4.7 3.5 - 5.1 mmol/L    Chloride 106 98 - 107 mmol/L    Carbon Dioxide 20 (L) 22 - 29 mmol/L    Glucose Level 75 74 - 100 mg/dL    Blood Urea Nitrogen 46.7 (H) 8.9 - 20.6 mg/dL    Creatinine 4.75 (H) 0.73 - 1.18 mg/dL    Calcium Level Total 7.5 (L) 8.4 - 10.2 mg/dL    Protein Total 5.4 (L) 6.4 - 8.3 gm/dL    Albumin Level 2.4 (L) 3.5 - 5.0 g/dL    Globulin 3.0 2.4 - 3.5 gm/dL    Albumin/Globulin Ratio 0.8 (L) 1.1 - 2.0 ratio    Bilirubin Total 0.4 <=1.5 mg/dL    Alkaline Phosphatase 55 40 - 150 unit/L    Alanine Aminotransferase 434 (H) 0 - 55 unit/L    Aspartate Aminotransferase 766 (H) 5 - 34 unit/L    eGFR 16 mls/min/1.73/m2   Magnesium    Collection Time: 06/05/23  4:07 AM   Result Value Ref Range    Magnesium Level 2.10 1.60 - 2.60 mg/dL   Phosphorus    Collection Time: 06/05/23  4:07 AM   Result Value Ref Range    Phosphorus Level 4.1 2.3 - 4.7 mg/dL   CK    Collection Time: 06/05/23  4:07 AM   Result Value Ref Range    Creatine Kinase 19,692 (H) 30 - 200 U/L   CBC with Differential    Collection Time: 06/05/23  4:07 AM   Result Value Ref Range    WBC 22.58 (H) 4.50 - 11.50 x10(3)/mcL    RBC 3.98 (L) 4.70 - 6.10 x10(6)/mcL    Hgb 11.5 (L) 14.0 - 18.0 g/dL    Hct 35.8 (L) 42.0 - 52.0 %    MCV 89.9 80.0 - 94.0 fL    MCH 28.9 27.0 - 31.0 pg    MCHC 32.1 (L) 33.0 - 36.0 g/dL    RDW 12.9  11.5 - 17.0 %    Platelet 215 130 - 400 x10(3)/mcL    MPV 10.3 7.4 - 10.4 fL    Neut % 86.4 %    Lymph % 5.4 %    Mono % 7.0 %    Eos % 0.2 %    Basophil % 0.2 %    Lymph # 1.23 0.6 - 4.6 x10(3)/mcL    Neut # 19.50 (H) 2.1 - 9.2 x10(3)/mcL    Mono # 1.59 (H) 0.1 - 1.3 x10(3)/mcL    Eos # 0.04 0 - 0.9 x10(3)/mcL    Baso # 0.05 <=0.2 x10(3)/mcL    IG# 0.17 (H) 0 - 0.04 x10(3)/mcL    IG% 0.8 %    NRBC% 0.0 %   APTT    Collection Time: 06/05/23  6:01 AM   Result Value Ref Range    PTT 60.0 <150.0 seconds   Blood Gas (ABG)    Collection Time: 06/05/23  6:16 AM   Result Value Ref Range    Sample Type Arterial Blood     Sample site Arterial Line     Drawn by RA     pH, Blood gas 7.400 7.350 - 7.450    pCO2, Blood gas 39.0 35.0 - 45.0 mmHg    pO2, Blood gas 91.0 75.0 - 100.0 mmHg    TOC2, Blood gas 25.4 22.0 - 26.0 mmol/L    Base Excess, Blood gas -0.50 -2.00 - 2.00 mmol/L    sO2, Blood gas 98.5 >=90.0 %    HCO3, Blood gas 24.2 22.0 - 26.0 mmol/L    pAO2 664 mmHg    A-aDO2 573 mmHg    THb, Blood gas 11.8 (L) 12 - 18 g/dL    O2 Hb, Blood Gas 96.7 94.0 - 100.0 %    CO Hgb 1.4 0.5 - 1.5 %    Met Hgb 0.5 0 - 1.5 %    Allens Test N/A     FIO2, Blood gas 100.0 %    BiPAP (I) 18 cm H2O    BiPAP (E) 12 cm H2O       Imaging:   Imaging Results              CT Chest Abdomen Pelvis Without Contrast (XPD) (Final result)  Result time 06/02/23 18:40:12      Final result by Arley Cuevas MD (06/02/23 18:40:12)                   Impression:      1. Multifocal lung consolidation, likely pneumonia.  2. Small volume ascites with nonspecific gallbladder wall thickening.  Some of the ascitic fluid in the pelvis appears complex.  3. Mild hepatomegaly.      Electronically signed by: Arley Cuevas  Date:    06/02/2023  Time:    18:40               Narrative:    EXAMINATION:  CT CHEST ABDOMEN PELVIS WITHOUT CONTRAST(XPD)    CLINICAL HISTORY:  Sepsis; Sepsis, unspecified organism    TECHNIQUE:  CT imaging from the chest through the pelvis without  IV contrast. Axial, coronal and sagittal reformatted images reviewed. Dose length product 263 mGycm. Automatic exposure control, adjustment of mA/kV or iterative reconstruction technique used to limit radiation dose.    COMPARISON:  No relevant comparison studies available at the time of dictation.    FINDINGS:  Assessment of the visceral organs and vasculature is limited by the lack of IV contrast.    Lung and large airways: Multifocal consolidation in both lungs, greatest in the lower lobes.    Pleura: No significant pleural fluid.    Mediastinum and nae: Normal heart size.  No pathologically enlarged lymph node identified.    Chest wall/axilla and lower neck: No significant findings.    Liver/biliary: Mild hepatomegaly.  Suspected gallbladder wall thickening.  No biliary dilatation.    Pancreas: Normal.    Spleen: Normal.    Adrenals: Normal.    Genitourinary: Right kidney not identified.  No urinary stone or hydronephrosis on the left.  Normal bladder.    Stomach/bowel: No bowel obstruction.    Abdominal/pelvic lymph nodes and peritoneum: No pathologically enlarged lymph node identified. Small volume ascites with increased fluid density in portions of the pelvis.    Abdominal and pelvic vasculature: Normal abdominal aortic caliber.    Abdominal wall: High-riding right testicle along the right inguinal canal.    Musculoskeletal: No acute osseous findings.                                       US Retroperitoneal Limited (Final result)  Result time 06/02/23 16:31:06      Final result by Familia John MD (06/02/23 16:31:06)                   Narrative:    EXAMINATION  US RETROPERITONEAL LIMITED    CLINICAL HISTORY  lalita;    TECHNIQUE  Multiplanar grayscale sonographic images were obtained of the retroperitoneum.    COMPARISON  None available at the time of initial interpretation.    FINDINGS  Exam quality: adequate for evaluation    Kidneys: The right kidney is not visualized, with no focal or otherwise suspicious  findings of the ipsilateral retroperitoneal region.  The left kidney measures 14.1 cm x 7.2 cm x 6.3 cm.  The left renal parenchyma is homogeneous and normal by sonographic appearance, with smooth marginated cortex.  No masses or complex cysts are appreciated.  No collecting system dilatation.  No shadowing calcification is identified.  No perinephric collection or free abdominal fluid.    Miscellaneous: There is incidentally appreciated fluid partially visualized through the left abdominal cavity.  Scattered internal punctate echogenic foci may represent associated debris.    IMPRESSION  1. Solitary left kidney, without sonographic findings of acute urologic abnormality.  2. Partially visualized fluid with echogenic debris at the left hemiabdomen is nonspecific; differential includes complex ascites or collection, versus distended stomach/bowel.      Electronically signed by: Familia John  Date:    06/02/2023  Time:    16:31                                     X-Ray Chest AP Portable (Final result)  Result time 06/02/23 13:48:55      Final result by Franco Gonzáles MD (06/02/23 13:48:55)                   Impression:      Findings concerning for right mid to lower lung infectious process.      Electronically signed by: Franco Gonzáles  Date:    06/02/2023  Time:    13:48               Narrative:    EXAMINATION:  XR CHEST AP PORTABLE    CLINICAL HISTORY:  Sepsis;    TECHNIQUE:  Single view of the chest    COMPARISON:  No prior imaging available for comparison.    FINDINGS:  Increased interstitial markings in the right greater than left lung.    The cardiomediastinal silhouette is within normal limits.    No acute osseous abnormality.                                       Medications:     Current Facility-Administered Medications:     0.9%  NaCl infusion, , Intravenous, Continuous, VICTORIA Hill, Last Rate: 100 mL/hr at 06/05/23 0531, New Bag at 06/05/23 0531    calcium gluconate 1 g in NS IVPB (premixed), 1  g, Intravenous, PRN, Lm Yanes MD, Stopped at 06/04/23 0346    calcium gluconate 1 g in NS IVPB (premixed), 2 g, Intravenous, PRN, Lm Yanes MD    calcium gluconate 1 g in NS IVPB (premixed), 3 g, Intravenous, PRN, Lm Yanes MD    dexmedetomidine (PRECEDEX) 400mcg/100mL 0.9% NaCL infusion, 0-1.4 mcg/kg/hr, Intravenous, Continuous, Dillon Dominguez MD, Last Rate: 24.3 mL/hr at 06/05/23 1015, 1.4 mcg/kg/hr at 06/05/23 1015    dextrose 10% bolus 125 mL 125 mL, 12.5 g, Intravenous, PRN, Alda Valladares MD, Stopped at 06/02/23 2234    dextrose 40 % gel 15,000 mg, 15 g, Oral, PRN, Alda Valladares MD    dextrose 40 % gel 30,000 mg, 30 g, Oral, PRN, Alda Valladares MD    doxycycline (VIBRAMYCIN) 100 mg in sodium chloride 0.9% 250 mL IVPB, 100 mg, Intravenous, Q12H, Dominic Armijo MD, Stopped at 06/05/23 0951    glucagon (human recombinant) injection 1 mg, 1 mg, Intramuscular, PRN, Alda Valladares MD    heparin 25,000 units in dextrose 5% (100 units/ml) IV bolus from bag - ADDITIONAL PRN BOLUS - 30 units/kg (max bolus 4000 units), 30 Units/kg (Adjusted), Intravenous, PRN, Dillon Dominguez MD, 2,020 Units at 06/05/23 0747    heparin 25,000 units in dextrose 5% (100 units/ml) IV bolus from bag - ADDITIONAL PRN BOLUS - 60 units/kg (max bolus 4000 units), 59.3 Units/kg (Adjusted), Intravenous, PRN, Dillon Dominguez MD, 4,000 Units at 06/04/23 2353    heparin 25,000 units in dextrose 5% 250 mL (100 units/mL) infusion LOW INTENSITY nomogram - LAF, 0-40 Units/kg/hr (Adjusted), Intravenous, Continuous, Dillon Dominguez MD, Last Rate: 15.5 mL/hr at 06/05/23 0746, 23 Units/kg/hr at 06/05/23 0746    HYDROmorphone injection 1 mg, 1 mg, Intravenous, Q6H PRN, Dominic Armijo MD, 1 mg at 06/05/23 0950    insulin regular injection 6.94 Units 0.0694 mL, 0.1 Units/kg, Intravenous, PRN, Dominic Armijo MD, 6.94 Units at 06/02/23 2112    levETIRAcetam in NaCl (iso-os) IVPB 500 mg, 500 mg, Intravenous, Q12H, Dillon Dominguez MD, Stopped at  06/05/23 0921    LIDOcaine 5 % patch 1 patch, 1 patch, Transdermal, Q24H, Dillon Dominguez MD, 1 patch at 06/04/23 1719    mupirocin 2 % ointment, , Nasal, BID, Dewayne Torres MD, Given at 06/05/23 0851    naloxone 0.4 mg/mL injection 0.02 mg, 0.02 mg, Intravenous, PRN, Alda Valladares MD    NORepinephrine 4 mg in dextrose 5% 250 mL infusion (premix) (titrating), 0-3 mcg/kg/min, Intravenous, Continuous, Juan Manuel Miranda MD, Paused at 06/04/23 0200    piperacillin-tazobactam (ZOSYN) 4.5 g in sodium chloride 0.9 % 100 mL IVPB (MB+), 4.5 g, Intravenous, Q12H, Dominic Armijo MD, Stopped at 06/05/23 0904    sodium chloride 0.9% flush 10 mL, 10 mL, Intravenous, Q12H PRN, Alda Valladares MD    Pharmacy to dose Vancomycin consult, , , Once **AND** vancomycin - pharmacy to dose, , Intravenous, pharmacy to manage frequency, Alda Valladares MD     Impression/Plan:  1. 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  Patient does have brisk diuresis probably post ATN diuresis.  We will continue to provide dialysis support and monitor closely.  Patient seen on hemodialysis today.  Christal Forte M.D.  Nephrology

## 2023-06-13 ENCOUNTER — OFFICE VISIT (OUTPATIENT)
Dept: FAMILY MEDICINE CLINIC | Facility: CLINIC | Age: 53
End: 2023-06-13
Payer: COMMERCIAL

## 2023-06-13 VITALS
TEMPERATURE: 97.8 F | HEART RATE: 70 BPM | HEIGHT: 65 IN | BODY MASS INDEX: 22.08 KG/M2 | SYSTOLIC BLOOD PRESSURE: 102 MMHG | RESPIRATION RATE: 16 BRPM | OXYGEN SATURATION: 100 % | WEIGHT: 132.5 LBS | DIASTOLIC BLOOD PRESSURE: 60 MMHG

## 2023-06-13 DIAGNOSIS — E03.9 ACQUIRED HYPOTHYROIDISM: Chronic | ICD-10-CM

## 2023-06-13 DIAGNOSIS — R10.13 DYSPEPSIA: ICD-10-CM

## 2023-06-13 DIAGNOSIS — G43.709 CHRONIC MIGRAINE WITHOUT AURA WITHOUT STATUS MIGRAINOSUS, NOT INTRACTABLE: ICD-10-CM

## 2023-06-13 DIAGNOSIS — Z82.49 FAMILY HISTORY OF CORONARY ARTERY DISEASE IN FATHER: ICD-10-CM

## 2023-06-13 DIAGNOSIS — R53.82 CHRONIC FATIGUE: ICD-10-CM

## 2023-06-13 DIAGNOSIS — Z00.00 WELL WOMAN EXAM (NO GYNECOLOGICAL EXAM): Primary | ICD-10-CM

## 2023-06-13 DIAGNOSIS — Z83.42 FAMILY HISTORY OF HYPERCHOLESTEROLEMIA: ICD-10-CM

## 2023-06-13 PROCEDURE — 99396 PREV VISIT EST AGE 40-64: CPT | Performed by: FAMILY MEDICINE

## 2023-06-13 RX ORDER — OMEPRAZOLE 40 MG/1
40 CAPSULE, DELAYED RELEASE ORAL
Qty: 90 CAPSULE | Refills: 1 | Status: SHIPPED | OUTPATIENT
Start: 2023-06-13

## 2023-06-13 RX ORDER — TOPIRAMATE 50 MG/1
50 TABLET, FILM COATED ORAL 2 TIMES DAILY
Qty: 180 TABLET | Refills: 1 | Status: SHIPPED | OUTPATIENT
Start: 2023-06-13

## 2023-06-13 NOTE — PROGRESS NOTES
"Subjective     Chief Complaint   Patient presents with    Annual Exam       Ivis Mcginnis is a 52 y.o. female who presents for an annual exam. The patient is sexually active. GYN screening history: last pap: approximate date  and was normal. The patient wears seatbelts: yes. The patient participates in regular exercise: yes.  The patient reports that there is not domestic violence in her life.     She does have some issues she wants to talk about  She notes a lot of acid, wore in the AM  Also with some pain in her upper abdomen  Has been an ongoing thing for years and is getting a little worse form time to time  Pain does come and go  Pain can be sharp and intense  Has not noticed any particular foods make it worse      She is tired a lot      History of abnormal Pap smear: no  Colonoscopy history:   cologuard       Menstrual History:  OB History          4    Para   3    Term   3            AB   1    Living   3         SAB   1    IAB        Ectopic        Molar        Multiple        Live Births                     No LMP recorded. Patient has had a hysterectomy.         The following portions of the patient's history were reviewed and updated as appropriate:vital signs, allergies, current medications, past family history, past medical history, past social history, past surgical history, and problem list    Review of Systems   Pertinent items are noted in HPI.     Objective     /60   Pulse 70   Temp 97.8 °F (36.6 °C)   Resp 16   Ht 165.1 cm (65\")   Wt 60.1 kg (132 lb 8 oz)   SpO2 100%   BMI 22.05 kg/m²       Physical Exam  Vitals and nursing note reviewed.   Constitutional:       General: She is not in acute distress.     Appearance: Normal appearance. She is well-developed.   HENT:      Head: Normocephalic and atraumatic.      Right Ear: Tympanic membrane, ear canal and external ear normal.      Left Ear: Tympanic membrane, ear canal and external ear normal.      Nose: Nose " normal.   Eyes:      Extraocular Movements: Extraocular movements intact.      Conjunctiva/sclera: Conjunctivae normal.   Neck:      Thyroid: No thyromegaly.   Cardiovascular:      Rate and Rhythm: Normal rate and regular rhythm.      Heart sounds: Normal heart sounds. No murmur heard.  Pulmonary:      Effort: Pulmonary effort is normal. No respiratory distress.      Breath sounds: Normal breath sounds.   Abdominal:      General: Bowel sounds are normal. There is no distension.      Palpations: Abdomen is soft.      Tenderness: There is no abdominal tenderness. There is no guarding or rebound.   Musculoskeletal:      Cervical back: Normal range of motion and neck supple.   Lymphadenopathy:      Cervical: No cervical adenopathy.   Skin:     General: Skin is warm and dry.   Neurological:      Mental Status: She is alert and oriented to person, place, and time.   Psychiatric:         Mood and Affect: Mood normal.         Behavior: Behavior normal.         Thought Content: Thought content normal.         Judgment: Judgment normal.           Assessment & Plan     ASSESSMENT  Healthy female exam.    1. Well woman exam (no gynecological exam)    2. Dyspepsia    3. Family history of hypercholesterolemia    4. Chronic fatigue    5. Acquired hypothyroidism    6. Family history of coronary artery disease in father    7. Chronic migraine without aura without status migrainosus, not intractable         PLAN  1.   Orders Placed This Encounter   Procedures    CT Cardiac Calcium Score Without Dye    Comprehensive Metabolic Panel    Lipid Panel    TSH    CBC & Differential       2. Medications prescribed this encounter:      New Medications Ordered This Visit   Medications    omeprazole (priLOSEC) 40 MG capsule     Sig: Take 1 capsule by mouth every night at bedtime.     Dispense:  90 capsule     Refill:  1    topiramate (TOPAMAX) 50 MG tablet     Sig: Take 1 tablet by mouth 2 (Two) Times a Day.     Dispense:  180 tablet     Refill:   1       3. Counseled pt about well woman care including continued healthy eating and exercise.  Will check lipids  Will use prilosec for dyspepsia and she will call back if pain not better.  She will monitor for triggers as well  Will check lipids and Ct calcium score for Fam Hx of CAD.  Ok topamax for migraines, working well    Sajan Odom MD  6/13/2023

## 2023-07-07 LAB
ALBUMIN SERPL-MCNC: 4.8 G/DL (ref 3.5–5.2)
ALBUMIN/GLOB SERPL: 2.2 G/DL
ALP SERPL-CCNC: 43 U/L (ref 39–117)
ALT SERPL-CCNC: 14 U/L (ref 1–33)
AST SERPL-CCNC: 17 U/L (ref 1–32)
BASOPHILS # BLD AUTO: ABNORMAL 10*3/UL
BASOPHILS # BLD MANUAL: 0.05 10*3/MM3 (ref 0–0.2)
BASOPHILS NFR BLD MANUAL: 1 % (ref 0–1.5)
BILIRUB SERPL-MCNC: 0.4 MG/DL (ref 0–1.2)
BUN SERPL-MCNC: 17 MG/DL (ref 6–20)
BUN/CREAT SERPL: 16.3 (ref 7–25)
CALCIUM SERPL-MCNC: 10.5 MG/DL (ref 8.6–10.5)
CHLORIDE SERPL-SCNC: 105 MMOL/L (ref 98–107)
CHOLEST SERPL-MCNC: 204 MG/DL (ref 0–200)
CO2 SERPL-SCNC: 28.9 MMOL/L (ref 22–29)
CREAT SERPL-MCNC: 1.04 MG/DL (ref 0.57–1)
DIFFERENTIAL COMMENT: NORMAL
EGFRCR SERPLBLD CKD-EPI 2021: 64.4 ML/MIN/1.73
EOSINOPHIL # BLD AUTO: ABNORMAL 10*3/UL
EOSINOPHIL # BLD MANUAL: 0.16 10*3/MM3 (ref 0–0.4)
EOSINOPHIL NFR BLD AUTO: ABNORMAL %
EOSINOPHIL NFR BLD MANUAL: 3 % (ref 0.3–6.2)
ERYTHROCYTE [DISTWIDTH] IN BLOOD BY AUTOMATED COUNT: 12.3 % (ref 12.3–15.4)
GLOBULIN SER CALC-MCNC: 2.2 GM/DL
GLUCOSE SERPL-MCNC: 98 MG/DL (ref 65–99)
HCT VFR BLD AUTO: 42.2 % (ref 34–46.6)
HDLC SERPL-MCNC: 88 MG/DL (ref 40–60)
HGB BLD-MCNC: 14.3 G/DL (ref 12–15.9)
LDLC SERPL CALC-MCNC: 95 MG/DL (ref 0–100)
LYMPHOCYTES # BLD AUTO: ABNORMAL 10*3/UL
LYMPHOCYTES # BLD MANUAL: 2.15 10*3/MM3 (ref 0.7–3.1)
LYMPHOCYTES NFR BLD AUTO: ABNORMAL %
LYMPHOCYTES NFR BLD MANUAL: 40.4 % (ref 19.6–45.3)
MCH RBC QN AUTO: 36.7 PG (ref 26.6–33)
MCHC RBC AUTO-ENTMCNC: 33.9 G/DL (ref 31.5–35.7)
MCV RBC AUTO: 108.2 FL (ref 79–97)
MONOCYTES # BLD MANUAL: 0.48 10*3/MM3 (ref 0.1–0.9)
MONOCYTES NFR BLD AUTO: ABNORMAL %
MONOCYTES NFR BLD MANUAL: 9.1 % (ref 5–12)
NEUTROPHILS # BLD MANUAL: 2.47 10*3/MM3 (ref 1.7–7)
NEUTROPHILS NFR BLD AUTO: ABNORMAL %
NEUTROPHILS NFR BLD MANUAL: 46.5 % (ref 42.7–76)
PLATELET # BLD AUTO: 198 10*3/MM3 (ref 140–450)
PLATELET BLD QL SMEAR: NORMAL
POTASSIUM SERPL-SCNC: 4.3 MMOL/L (ref 3.5–5.2)
PROT SERPL-MCNC: 7 G/DL (ref 6–8.5)
RBC # BLD AUTO: 3.9 10*6/MM3 (ref 3.77–5.28)
RBC MORPH BLD: NORMAL
SODIUM SERPL-SCNC: 143 MMOL/L (ref 136–145)
TRIGL SERPL-MCNC: 125 MG/DL (ref 0–150)
TSH SERPL DL<=0.005 MIU/L-ACNC: 3.96 UIU/ML (ref 0.27–4.2)
VLDLC SERPL CALC-MCNC: 21 MG/DL (ref 5–40)
WBC # BLD AUTO: 5.31 10*3/MM3 (ref 3.4–10.8)

## 2023-07-25 ENCOUNTER — OFFICE VISIT (OUTPATIENT)
Dept: ENDOCRINOLOGY | Facility: CLINIC | Age: 53
End: 2023-07-25
Payer: COMMERCIAL

## 2023-07-25 VITALS
DIASTOLIC BLOOD PRESSURE: 54 MMHG | HEIGHT: 65 IN | WEIGHT: 135 LBS | BODY MASS INDEX: 22.49 KG/M2 | SYSTOLIC BLOOD PRESSURE: 110 MMHG | HEART RATE: 75 BPM | OXYGEN SATURATION: 100 %

## 2023-07-25 DIAGNOSIS — E03.9 ACQUIRED HYPOTHYROIDISM: Primary | Chronic | ICD-10-CM

## 2023-07-25 PROCEDURE — 99213 OFFICE O/P EST LOW 20 MIN: CPT | Performed by: INTERNAL MEDICINE

## 2023-07-25 RX ORDER — LEVOTHYROXINE SODIUM 0.07 MG/1
75 TABLET ORAL DAILY
Qty: 90 TABLET | Refills: 3 | Status: SHIPPED | OUTPATIENT
Start: 2023-07-25

## 2023-07-25 NOTE — PROGRESS NOTES
"     Office Note      Date: 2023  Patient Name: Ivis Mcginnis  MRN: [id number]  : 1970    Chief Complaint   Patient presents with    Hypothyroidism     Acquired hypothyroidism         History of Present Illness:   Ivis Mcginnis is a 53 y.o. female who presents for Hypothyroidism (Acquired hypothyroidism/)    She remains on T4 75mcg qd. She is taking this correctly. She isn't taking any interfering meds concurrently. However, she is taking biotin supplement.  She hasn't noted any change in the size of her neck. She denies any compressive sxs. She denies any sxs of hypo- or hyperthyroidism at this time, aside from fatigue.  The fatigue comes and goes.     Subjective      Review of Systems:   Review of Systems   Constitutional:  Positive for fatigue.   Cardiovascular: Negative.    Gastrointestinal:  Positive for constipation.   Endocrine: Negative.      The following portions of the patient's history were reviewed and updated as appropriate: allergies, current medications, past family history, past medical history, past social history, past surgical history, and problem list.    Objective     Visit Vitals  /54 (BP Location: Right arm, Patient Position: Sitting, Cuff Size: Adult)   Pulse 75   Ht 165.1 cm (65\")   Wt 61.2 kg (135 lb)   SpO2 100%   BMI 22.47 kg/m²       Physical Exam:  Physical Exam  Constitutional:       Appearance: Normal appearance.   Neck:      Thyroid: No thyroid mass, thyromegaly or thyroid tenderness.   Lymphadenopathy:      Cervical: No cervical adenopathy.   Neurological:      Mental Status: She is alert.       Labs:    TSH  No results found for: TSHBASE     Free T4  Free T4   Date Value Ref Range Status   2021 1.39 0.93 - 1.70 ng/dL Final       T3  No results found for: I5KBLTO      TPO  No results found for: THYROIDAB    TG AB  No results found for: THGAB    TG  No results found for: THYROGLB    CBC w/DIFF  Lab Results   Component Value Date    WBC 5.31 2023 "    RBC 3.90 07/06/2023    HGB 14.3 07/06/2023    HCT 42.2 07/06/2023    .2 (H) 07/06/2023    MCH 36.7 (H) 07/06/2023    MCHC 33.9 07/06/2023    RDW 12.3 07/06/2023    RDWSD 46.9 07/13/2022    MPV 10.3 07/13/2022     07/06/2023    NEUTRORELPCT CANCELED 07/06/2023    LYMPHORELPCT CANCELED 07/06/2023    MONORELPCT CANCELED 07/06/2023    EOSRELPCT CANCELED 07/06/2023    BASORELPCT 0.0 08/04/2021    AUTOIGPER 0.0 08/04/2021    NEUTROABS 2.47 07/06/2023    LYMPHSABS CANCELED 07/06/2023    LYMPHSABS 2.15 07/06/2023    MONOSABS 0.48 07/06/2023    EOSABS CANCELED 07/06/2023    EOSABS 3.0 07/06/2023    EOSABS 0.16 07/06/2023    BASOSABS CANCELED 07/06/2023    BASOSABS 0.05 07/06/2023    AUTOIGNUM 0.01 08/04/2021    NRBC 0.0 08/04/2021           Assessment / Plan      Assessment & Plan:  Diagnoses and all orders for this visit:    1. Acquired hypothyroidism (Primary)  Assessment & Plan:  Continue T4 tx.  Recent TSH has crept up.  Will plan to recheck sooner to follow up.      Other orders  -     levothyroxine (SYNTHROID, LEVOTHROID) 75 MCG tablet; Take 1 tablet by mouth Daily.  Dispense: 90 tablet; Refill: 3      Current Outpatient Medications   Medication Instructions    ascorbic acid (VITAMIN C) 1,000 mg, Oral, Daily    Biotin 65350 MCG tablet 1 tablet, Oral, Daily    Calcium Carb-Cholecalciferol (CALCIUM 600+D3 PO) 1 tablet, Oral, Daily    Cholecalciferol (VITAMIN D) 2000 UNITS capsule Oral    estradiol (ESTRACE) 1.5 mg, Oral, Daily    levothyroxine (SYNTHROID, LEVOTHROID) 75 mcg, Oral, Daily    loratadine-pseudoephedrine (CLARITIN-D 24-hour)  MG per 24 hr tablet Take one tablet by mouth once daily    Multiple Vitamins-Minerals (MULTIVITAMIN ADULT PO) 1 tablet, Oral, Daily    nitrofurantoin (macrocrystal-monohydrate) (MACROBID) 100 mg, Oral, 2 Times Daily    omeprazole (PRILOSEC) 40 mg, Oral, Every Night at Bedtime    topiramate (TOPAMAX) 50 mg, Oral, 2 Times Daily    valACYclovir (VALTREX) 1000 MG  tablet Take 1 tablet by mouth once daily      Return in about 3 months (around 10/25/2023) for Recheck with TSH.    Trey Higuera MD   07/25/2023

## 2023-08-21 NOTE — TELEPHONE ENCOUNTER
Caller: Ras Ivis Thi    Relationship: Self    Best call back number: [phone]     Requested Prescriptions:   Requested Prescriptions     Pending Prescriptions Disp Refills    loratadine-pseudoephedrine (CLARITIN-D 24-hour)  MG per 24 hr tablet 90 tablet 0     Sig: Take 1 tablet by mouth Daily.        Pharmacy where request should be sent: [facility] - [facility] - [place], [place] - [address] AT [facility] [phone] [facility] [phone] FX     Last office visit with prescribing clinician: 6/13/2023   Last telemedicine visit with prescribing clinician: Visit date not found   Next office visit with prescribing clinician: Visit date not found     Does the patient have less than a 3 day supply:  [x] Yes  [] No    Laurent Lopez Rep   08/21/23 14:44 EDT

## 2023-08-24 RX ORDER — NITROFURANTOIN 25; 75 MG/1; MG/1
100 CAPSULE ORAL NIGHTLY
Qty: 30 CAPSULE | Refills: 0 | Status: SHIPPED | OUTPATIENT
Start: 2023-08-24

## 2023-08-24 RX ORDER — NITROFURANTOIN 25; 75 MG/1; MG/1
CAPSULE ORAL
Qty: 30 CAPSULE | Refills: 0 | Status: SHIPPED | OUTPATIENT
Start: 2023-08-24

## 2023-08-24 NOTE — TELEPHONE ENCOUNTER
Pt is calling and needs her Nitrofur called in. She said she had thought that it was being filled, but she said it had not yet.    Please advise    Thank you!

## 2023-09-08 ENCOUNTER — TELEPHONE (OUTPATIENT)
Dept: ENDOCRINOLOGY | Facility: CLINIC | Age: 53
End: 2023-09-08
Payer: COMMERCIAL

## 2023-09-08 DIAGNOSIS — E03.9 ACQUIRED HYPOTHYROIDISM: Primary | Chronic | ICD-10-CM

## 2023-09-08 NOTE — TELEPHONE ENCOUNTER
Pt called feels like her levels are off would like to have her TSH lab done. Pt will go to [facility] facility. Pt last ov 07/25/23 pt next ov 12/05/23. Please contact pt once order is entered

## 2023-09-12 ENCOUNTER — LAB (OUTPATIENT)
Dept: LAB | Facility: HOSPITAL | Age: 53
End: 2023-09-12
Payer: COMMERCIAL

## 2023-09-12 DIAGNOSIS — E03.9 ACQUIRED HYPOTHYROIDISM: Chronic | ICD-10-CM

## 2023-09-12 LAB — TSH SERPL DL<=0.05 MIU/L-ACNC: 0.7 UIU/ML (ref 0.27–4.2)

## 2023-09-12 PROCEDURE — 84443 ASSAY THYROID STIM HORMONE: CPT

## 2023-09-18 RX ORDER — VALACYCLOVIR HYDROCHLORIDE 1 G/1
TABLET, FILM COATED ORAL
Qty: 90 TABLET | Refills: 0 | Status: SHIPPED | OUTPATIENT
Start: 2023-09-18

## 2023-11-28 NOTE — TELEPHONE ENCOUNTER
Caller: Ivis Mcginnis    Relationship: Self    Best call back number: [phone]    Requested Prescriptions:   Requested Prescriptions     Pending Prescriptions Disp Refills    loratadine-pseudoephedrine (CLARITIN-D 24-hour)  MG per 24 hr tablet 90 tablet 0     Sig: Take 1 tablet by mouth Daily.        Pharmacy where request should be sent: [facility] - [facility] - [place], [place] - [address] AT [facility] [phone] [facility] [phone]      Last office visit with prescribing clinician: 6/13/2023   Last telemedicine visit with prescribing clinician: Visit date not found   Next office visit with prescribing clinician: Visit date not found     Additional details provided by patient: THE PATIENT IS OUT OF MEDICATION     Does the patient have less than a 3 day supply:  [x] Yes  [] No    Would you like a call back once the refill request has been completed: [] Yes [x] No    If the office needs to give you a call back, can they leave a voicemail: [] Yes [x] No    Laurent Marcos Rep   11/28/23 16:14 EST

## 2023-12-05 ENCOUNTER — OFFICE VISIT (OUTPATIENT)
Dept: ENDOCRINOLOGY | Facility: CLINIC | Age: 53
End: 2023-12-05
Payer: COMMERCIAL

## 2023-12-05 VITALS
OXYGEN SATURATION: 100 % | SYSTOLIC BLOOD PRESSURE: 116 MMHG | HEIGHT: 65 IN | DIASTOLIC BLOOD PRESSURE: 68 MMHG | WEIGHT: 132 LBS | HEART RATE: 78 BPM | BODY MASS INDEX: 21.99 KG/M2

## 2023-12-05 DIAGNOSIS — E03.9 ACQUIRED HYPOTHYROIDISM: Primary | Chronic | ICD-10-CM

## 2023-12-05 PROCEDURE — 84443 ASSAY THYROID STIM HORMONE: CPT | Performed by: INTERNAL MEDICINE

## 2023-12-05 NOTE — PROGRESS NOTES
"     Office Note      Date: 2023  Patient Name: Ivis Mcginnis  MRN: [id number]  : 1970    Chief Complaint   Patient presents with    Thyroid Problem     Acquired hypothyroidism         History of Present Illness:   Ivis Mcginnis is a 53 y.o. female who presents for Thyroid Problem (Acquired hypothyroidism/)    She remains on T4 75mcg qd. She is taking this correctly. She isn't taking any interfering meds concurrently.  She hasn't noted any change in the size of her neck. She denies any compressive sxs. She denies any sxs of hypo- or hyperthyroidism at this time, aside from fatigue but this has improved.     Subjective      Review of Systems:   Review of Systems   Constitutional:  Positive for fatigue.   Cardiovascular: Negative.    Gastrointestinal: Negative.    Endocrine: Negative.        The following portions of the patient's history were reviewed and updated as appropriate: allergies, current medications, past family history, past medical history, past social history, past surgical history, and problem list.    Objective     Visit Vitals  /68 (BP Location: Left arm, Patient Position: Sitting, Cuff Size: Adult)   Pulse 78   Ht 165.1 cm (65\")   Wt 59.9 kg (132 lb)   SpO2 100%   BMI 21.97 kg/m²       Physical Exam:  Physical Exam  Constitutional:       Appearance: Normal appearance.   Neck:      Thyroid: No thyroid mass, thyromegaly or thyroid tenderness.   Lymphadenopathy:      Cervical: No cervical adenopathy.   Neurological:      Mental Status: She is alert.         Labs:    TSH  No results found for: \"TSHBASE\"     Free T4  Free T4   Date Value Ref Range Status   2021 1.39 0.93 - 1.70 ng/dL Final       T3  No results found for: \"A1LKIEK\"      TPO  No results found for: \"THYROIDAB\"    TG AB  No results found for: \"THGAB\"    TG  No results found for: \"THYROGLB\"    CBC w/DIFF  Lab Results   Component Value Date    WBC 5.31 2023    RBC 3.90 2023    HGB 14.3 2023    " HCT 42.2 07/06/2023    .2 (H) 07/06/2023    MCH 36.7 (H) 07/06/2023    MCHC 33.9 07/06/2023    RDW 12.3 07/06/2023    RDWSD 46.9 07/13/2022    MPV 10.3 07/13/2022     07/06/2023    NEUTRORELPCT CANCELED 07/06/2023    LYMPHORELPCT CANCELED 07/06/2023    MONORELPCT CANCELED 07/06/2023    EOSRELPCT CANCELED 07/06/2023    BASORELPCT 0.0 08/04/2021    AUTOIGPER 0.0 08/04/2021    NEUTROABS 2.47 07/06/2023    LYMPHSABS CANCELED 07/06/2023    LYMPHSABS 2.15 07/06/2023    MONOSABS 0.48 07/06/2023    EOSABS CANCELED 07/06/2023    EOSABS 3.0 07/06/2023    EOSABS 0.16 07/06/2023    BASOSABS CANCELED 07/06/2023    BASOSABS 0.05 07/06/2023    AUTOIGNUM 0.01 08/04/2021    NRBC 0.0 08/04/2021           Assessment / Plan      Assessment & Plan:  Diagnoses and all orders for this visit:    1. Acquired hypothyroidism (Primary)  Assessment & Plan:  Symptomatically okay today.  No goiter on exam.  Continue T4 tx.  Check TSH today.  Will send note about results.    Orders:  -     TSH; Future      Current Outpatient Medications   Medication Instructions    ascorbic acid (VITAMIN C) 1,000 mg, Oral, Daily    Biotin 30290 MCG tablet 1 tablet, Oral, Daily    Calcium Carb-Cholecalciferol (CALCIUM 600+D3 PO) 1 tablet, Oral, Daily    Cholecalciferol (VITAMIN D) 2000 UNITS capsule Oral    estradiol (ESTRACE) 1.5 mg, Oral, Daily    levothyroxine (SYNTHROID, LEVOTHROID) 75 mcg, Oral, Daily    loratadine-pseudoephedrine (CLARITIN-D 24-hour)  MG per 24 hr tablet 1 tablet, Oral, Daily    Multiple Vitamins-Minerals (MULTIVITAMIN ADULT PO) 1 tablet, Oral, Daily    topiramate (TOPAMAX) 50 mg, Oral, 2 Times Daily    valACYclovir (VALTREX) 1000 MG tablet Take 1 tablet by mouth once daily      Return in about 3 months (around 3/5/2024) for Recheck with TSH.    Trey Higuera MD   12/05/2023

## 2023-12-05 NOTE — ASSESSMENT & PLAN NOTE
Symptomatically okay today.  No goiter on exam.  Continue T4 tx.  Check TSH today.  Will send note about results.

## 2023-12-06 LAB — TSH SERPL DL<=0.05 MIU/L-ACNC: 0.34 UIU/ML (ref 0.27–4.2)

## 2023-12-07 ENCOUNTER — OFFICE VISIT (OUTPATIENT)
Dept: FAMILY MEDICINE CLINIC | Facility: CLINIC | Age: 53
End: 2023-12-07
Payer: COMMERCIAL

## 2023-12-07 VITALS
HEIGHT: 65 IN | DIASTOLIC BLOOD PRESSURE: 64 MMHG | WEIGHT: 133 LBS | TEMPERATURE: 97.5 F | BODY MASS INDEX: 22.16 KG/M2 | OXYGEN SATURATION: 98 % | RESPIRATION RATE: 18 BRPM | HEART RATE: 81 BPM | SYSTOLIC BLOOD PRESSURE: 122 MMHG

## 2023-12-07 DIAGNOSIS — J01.00 ACUTE NON-RECURRENT MAXILLARY SINUSITIS: Primary | ICD-10-CM

## 2023-12-07 DIAGNOSIS — R05.1 ACUTE COUGH: ICD-10-CM

## 2023-12-07 PROCEDURE — 99213 OFFICE O/P EST LOW 20 MIN: CPT

## 2023-12-07 RX ORDER — DEXTROMETHORPHAN HYDROBROMIDE AND PROMETHAZINE HYDROCHLORIDE 15; 6.25 MG/5ML; MG/5ML
5 SYRUP ORAL NIGHTLY PRN
Qty: 118 ML | Refills: 0 | Status: SHIPPED | OUTPATIENT
Start: 2023-12-07

## 2023-12-07 RX ORDER — DOXYCYCLINE HYCLATE 100 MG/1
100 CAPSULE ORAL 2 TIMES DAILY
Qty: 14 CAPSULE | Refills: 0 | Status: SHIPPED | OUTPATIENT
Start: 2023-12-07

## 2023-12-07 NOTE — PROGRESS NOTES
"Chief Complaint   Patient presents with    Sinusitis     Facial pressure , drainage x 3 days loss of voice and a cough. Took at home test () was neg. Burning in nasal passage.        Subjective      Ivis Mcginnis is a 53 y.o. who presents for congestion, runny nose and cough.  States she also has loss of her voice since yesterday.  Headache as well.  Started about 3-4 days ago.   Sinus pain at bedtime.      Review of Systems   Constitutional:  Positive for chills. Negative for fever.   HENT:  Positive for congestion, postnasal drip, rhinorrhea, sinus pressure, sinus pain and voice change. Negative for ear pain and sore throat.    Respiratory:  Positive for cough (mild irritating). Negative for chest tightness and shortness of breath.    Cardiovascular:  Negative for chest pain and palpitations.   Neurological:  Positive for headaches.        Objective   Vital Signs:  /64   Pulse 81   Temp 97.5 °F (36.4 °C)   Resp 18   Ht 165.1 cm (65\")   Wt 60.3 kg (133 lb)   SpO2 98%   BMI 22.13 kg/m²     Physical Exam  Vitals and nursing note reviewed.   Constitutional:       Appearance: Normal appearance.   HENT:      Head: Normocephalic.      Right Ear: Tympanic membrane, ear canal and external ear normal.      Left Ear: Tympanic membrane, ear canal and external ear normal.      Nose:      Right Sinus: Maxillary sinus tenderness present. No frontal sinus tenderness.      Left Sinus: No maxillary sinus tenderness or frontal sinus tenderness.      Mouth/Throat:      Pharynx: Posterior oropharyngeal erythema present. No oropharyngeal exudate.   Cardiovascular:      Rate and Rhythm: Normal rate and regular rhythm.      Heart sounds: Normal heart sounds.   Pulmonary:      Breath sounds: Normal breath sounds.   Neurological:      Mental Status: She is alert and oriented to person, place, and time.   Psychiatric:         Mood and Affect: Mood normal.         Behavior: Behavior normal.          Result Review "                     Assessment and Plan  Diagnoses and all orders for this visit:    1. Acute non-recurrent maxillary sinusitis (Primary)  -     doxycycline (VIBRAMYCIN) 100 MG capsule; Take 1 capsule by mouth 2 (Two) Times a Day.  Dispense: 14 capsule; Refill: 0    2. Acute cough  -     promethazine-dextromethorphan (PROMETHAZINE-DM) 6.25-15 MG/5ML syrup; Take 5 mL by mouth At Night As Needed for Cough.  Dispense: 118 mL; Refill: 0      Medication as prescribed for sinus infection.   Cough - medication as prescribed for bedtime. May use OTC medication per package instructions during the day for symptoms.   Follow up if no improvement or worsening.         Discussed medications prescribed and OTC medications recommended.  Discussed medication safety, possible side effects and how to take or administer medications. Instructed patient to report any adverse reactions, side effects or concerns.     Reviewed physical exam findings and plan with patient who verbalized understanding and agrees with plan of care. Patient was given opportunity to ask questions and all concerns were addressed prior to the conclusion of today's visit.     Follow Up  No follow-ups on file.  Patient was given instructions and counseling regarding her condition or for health maintenance advice. Please see specific information pulled into the AVS if appropriate.     Note to patient: The 21st Century Cures Act makes medical notes like these available to patients in the interest of transparency. However, be advised this is a medical document. It is intended as peer to peer communication. It is written in medical language and may contain abbreviations or verbiage that are unfamiliar. It may appear blunt or direct. Medical documents are intended to carry relevant information, facts as evident, and the clinical opinion of the provider.

## 2024-01-02 RX ORDER — VALACYCLOVIR HYDROCHLORIDE 1 G/1
TABLET, FILM COATED ORAL
Qty: 90 TABLET | Refills: 0 | Status: SHIPPED | OUTPATIENT
Start: 2024-01-02

## 2024-02-27 NOTE — TELEPHONE ENCOUNTER
Caller: Ivis Mcginnis    Relationship: Self    Best call back number: [phone]     Requested Prescriptions:   Requested Prescriptions     Pending Prescriptions Disp Refills    loratadine-pseudoephedrine (CLARITIN-D 24-hour)  MG per 24 hr tablet 90 tablet 0     Sig: Take 1 tablet by mouth Daily.        Pharmacy where request should be sent: [facility] - [facility] - [place], [place] - [address] AT [facility] [phone] [facility] [phone] FX     Last office visit with prescribing clinician: 6/13/2023   Last telemedicine visit with prescribing clinician: Visit date not found   Next office visit with prescribing clinician: Visit date not found     Additional details provided by patient:     Does the patient have less than a 3 day supply:  [] Yes  [x] No    Would you like a call back once the refill request has been completed: [x] Yes [] No    If the office needs to give you a call back, can they leave a voicemail: [x] Yes [] No    Laurent Delatorre Rep   02/27/24 11:14 EST

## 2024-03-05 ENCOUNTER — OFFICE VISIT (OUTPATIENT)
Dept: FAMILY MEDICINE CLINIC | Facility: CLINIC | Age: 54
End: 2024-03-05
Payer: COMMERCIAL

## 2024-03-05 VITALS
DIASTOLIC BLOOD PRESSURE: 74 MMHG | HEIGHT: 65 IN | HEART RATE: 69 BPM | SYSTOLIC BLOOD PRESSURE: 110 MMHG | OXYGEN SATURATION: 100 % | WEIGHT: 132 LBS | TEMPERATURE: 97.5 F | RESPIRATION RATE: 16 BRPM | BODY MASS INDEX: 21.99 KG/M2

## 2024-03-05 DIAGNOSIS — Z12.11 SCREEN FOR COLON CANCER: ICD-10-CM

## 2024-03-05 DIAGNOSIS — R05.1 ACUTE COUGH: ICD-10-CM

## 2024-03-05 DIAGNOSIS — J01.00 ACUTE NON-RECURRENT MAXILLARY SINUSITIS: Primary | ICD-10-CM

## 2024-03-05 LAB
EXPIRATION DATE: NORMAL
EXPIRATION DATE: NORMAL
FLUAV AG NPH QL: NEGATIVE
FLUBV AG NPH QL: NEGATIVE
INTERNAL CONTROL: NORMAL
INTERNAL CONTROL: NORMAL
Lab: NORMAL
Lab: NORMAL
SARS-COV-2 AG UPPER RESP QL IA.RAPID: NOT DETECTED

## 2024-03-05 RX ORDER — DOXYCYCLINE 100 MG/1
100 TABLET ORAL 2 TIMES DAILY
Qty: 20 TABLET | Refills: 0 | Status: SHIPPED | OUTPATIENT
Start: 2024-03-05 | End: 2024-03-15

## 2024-03-05 RX ORDER — FLUOCINONIDE TOPICAL SOLUTION USP, 0.05% 0.5 MG/ML
SOLUTION TOPICAL
COMMUNITY
Start: 2024-02-27

## 2024-03-05 RX ORDER — TRETINOIN 0.5 MG/G
CREAM TOPICAL
COMMUNITY
Start: 2024-02-27

## 2024-03-05 RX ORDER — PREDNISONE 20 MG/1
40 TABLET ORAL DAILY
Qty: 10 TABLET | Refills: 0 | Status: SHIPPED | OUTPATIENT
Start: 2024-03-05

## 2024-03-05 NOTE — PROGRESS NOTES
Subjective   Ivis Mcginnis is a 53 y.o. female.     History of Present Illness     She has been il the past week  Congestion as well  Then worse the past 24 hours  Cough, achiness, ST, voice change  No fever noted  Also with mild sinus pressure  Generalized sorenes when she takes a deep breath    The following portions of the patient's history were reviewed and updated as appropriate: allergies, current medications, past family history, past medical history, past social history, past surgical history, and problem list.    Review of Systems    Objective   Physical Exam  Vitals and nursing note reviewed.   Constitutional:       Appearance: She is well-developed.   HENT:      Head: Normocephalic and atraumatic.      Right Ear: Hearing, tympanic membrane, ear canal and external ear normal.      Left Ear: Hearing, tympanic membrane, ear canal and external ear normal.      Nose: Nose normal.      Right Sinus: Maxillary sinus tenderness (minor) present.      Left Sinus: Maxillary sinus tenderness present.      Mouth/Throat:      Pharynx: Uvula midline.   Eyes:      Conjunctiva/sclera: Conjunctivae normal.   Cardiovascular:      Rate and Rhythm: Normal rate and regular rhythm.      Heart sounds: Normal heart sounds.   Pulmonary:      Effort: Pulmonary effort is normal.      Breath sounds: Normal breath sounds.   Musculoskeletal:      Cervical back: Normal range of motion.   Lymphadenopathy:      Cervical: No cervical adenopathy.   Psychiatric:         Behavior: Behavior normal.         Assessment & Plan   Diagnoses and all orders for this visit:    1. Acute non-recurrent maxillary sinusitis (Primary)  -     predniSONE (DELTASONE) 20 MG tablet; Take 2 tablets by mouth Daily.  Dispense: 10 tablet; Refill: 0  -     doxycycline (ADOXA) 100 MG tablet; Take 1 tablet by mouth 2 (Two) Times a Day for 10 days.  Dispense: 20 tablet; Refill: 0    2. Acute cough  -     POCT Influenza A/B  -     POCT SARS-CoV-2 Antigen ELIZABETH    3.  Screen for colon cancer  -     Ambulatory Referral For Screening Colonoscopy    COVID and flu negative.  Treat with doxy and pred  Pt to call back INB

## 2024-03-06 ENCOUNTER — OFFICE VISIT (OUTPATIENT)
Dept: ENDOCRINOLOGY | Facility: CLINIC | Age: 54
End: 2024-03-06
Payer: COMMERCIAL

## 2024-03-06 VITALS
BODY MASS INDEX: 21.99 KG/M2 | DIASTOLIC BLOOD PRESSURE: 58 MMHG | SYSTOLIC BLOOD PRESSURE: 98 MMHG | WEIGHT: 132 LBS | OXYGEN SATURATION: 100 % | HEIGHT: 65 IN | HEART RATE: 80 BPM

## 2024-03-06 DIAGNOSIS — E03.9 ACQUIRED HYPOTHYROIDISM: Primary | Chronic | ICD-10-CM

## 2024-03-06 LAB — TSH SERPL DL<=0.05 MIU/L-ACNC: 0.56 UIU/ML (ref 0.27–4.2)

## 2024-03-06 PROCEDURE — 84443 ASSAY THYROID STIM HORMONE: CPT | Performed by: INTERNAL MEDICINE

## 2024-03-06 NOTE — PROGRESS NOTES
"     Office Note      Date: 2024  Patient Name: Ivis Mcginnis  MRN: [id number]  : 1970    Chief Complaint   Patient presents with    Hypothyroidism       History of Present Illness:   Ivis Mcginnis is a 53 y.o. female who presents for Hypothyroidism    She remains on T4 75mcg qd. She is taking this correctly. She isn't taking any interfering meds concurrently.  She hasn't noted any change in the size of her neck. She denies any compressive sxs. She denies any sxs of hypo- or hyperthyroidism at this time, aside from fatigue but this has improved.     She is on biotin supplement but stopped it several days ago.    Subjective      Review of Systems:   Review of Systems   Constitutional:  Positive for fatigue.   Cardiovascular: Negative.    Gastrointestinal: Negative.    Endocrine: Negative.        The following portions of the patient's history were reviewed and updated as appropriate: allergies, current medications, past family history, past medical history, past social history, past surgical history, and problem list.    Objective     Visit Vitals  BP 98/58 (BP Location: Left arm, Patient Position: Sitting, Cuff Size: Adult)   Pulse 80   Ht 165.1 cm (65\")   Wt 59.9 kg (132 lb)   SpO2 100%   BMI 21.97 kg/m²       Physical Exam:  Physical Exam  Constitutional:       Appearance: Normal appearance.   Neck:      Thyroid: No thyroid mass, thyromegaly or thyroid tenderness.   Lymphadenopathy:      Cervical: No cervical adenopathy.   Neurological:      Mental Status: She is alert.         Labs:    TSH  No results found for: \"TSHBASE\"     Free T4  Free T4   Date Value Ref Range Status   2021 1.39 0.93 - 1.70 ng/dL Final       T3  No results found for: \"U3GXOVC\"      TPO  No results found for: \"THYROIDAB\"    TG AB  No results found for: \"THGAB\"    TG  No results found for: \"THYROGLB\"    CBC w/DIFF  Lab Results   Component Value Date    WBC 5.31 2023    RBC 3.90 2023    HGB 14.3 2023 "    HCT 42.2 07/06/2023    .2 (H) 07/06/2023    MCH 36.7 (H) 07/06/2023    MCHC 33.9 07/06/2023    RDW 12.3 07/06/2023    RDWSD 46.9 07/13/2022    MPV 10.3 07/13/2022     07/06/2023    NEUTRORELPCT CANCELED 07/06/2023    LYMPHORELPCT CANCELED 07/06/2023    MONORELPCT CANCELED 07/06/2023    EOSRELPCT CANCELED 07/06/2023    BASORELPCT 0.0 08/04/2021    AUTOIGPER 0.0 08/04/2021    NEUTROABS 2.47 07/06/2023    LYMPHSABS CANCELED 07/06/2023    LYMPHSABS 2.15 07/06/2023    MONOSABS 0.48 07/06/2023    EOSABS CANCELED 07/06/2023    EOSABS 3.0 07/06/2023    EOSABS 0.16 07/06/2023    BASOSABS CANCELED 07/06/2023    BASOSABS 0.05 07/06/2023    AUTOIGNUM 0.01 08/04/2021    NRBC 0.0 08/04/2021           Assessment / Plan      Assessment & Plan:  Diagnoses and all orders for this visit:    1. Acquired hypothyroidism (Primary)  Assessment & Plan:  Continue T4 dose.  Check TSH today.    Orders:  -     TSH; Future      Current Outpatient Medications   Medication Instructions    ascorbic acid (VITAMIN C) 1,000 mg, Oral, Daily    Biotin 10909 MCG tablet 1 tablet, Oral, Daily    Calcium Carb-Cholecalciferol (CALCIUM 600+D3 PO) 1 tablet, Oral, Daily    Cholecalciferol (VITAMIN D) 2000 UNITS capsule Oral    doxycycline (ADOXA) 100 mg, Oral, 2 Times Daily    estradiol (ESTRACE) 1.5 mg, Oral, Daily    fluocinonide (LIDEX) 0.05 % external solution APPLY TWICE DAILY MONDAY - THURSDAY TO NAIL MATRICES OF AFFECTED NAILS    levothyroxine (SYNTHROID, LEVOTHROID) 75 mcg, Oral, Daily    loratadine-pseudoephedrine (CLARITIN-D 24-hour)  MG per 24 hr tablet 1 tablet, Oral, Daily    Multiple Vitamins-Minerals (MULTIVITAMIN ADULT PO) 1 tablet, Oral, Daily    predniSONE (DELTASONE) 40 mg, Oral, Daily    topiramate (TOPAMAX) 50 mg, Oral, 2 Times Daily    tretinoin (RETIN-A) 0.05 % cream APPLY TO THE AFFECTED AREA(S) TOPICALLY 2-3 NIGHTS PER WEEK. PEA SIZE AMOUNT TO ENTIRE FACE.    valACYclovir (VALTREX) 1000 MG tablet Take 1 tablet by  mouth once daily      Return in about 3 months (around 6/6/2024) for Recheck with TSH.    Electronically signed by: Trey Higuera MD  03/06/2024

## 2024-03-18 RX ORDER — ESTRADIOL 1 MG/1
1.5 TABLET ORAL DAILY
Qty: 135 TABLET | Refills: 0 | Status: SHIPPED | OUTPATIENT
Start: 2024-03-18

## 2024-03-25 RX ORDER — VALACYCLOVIR HYDROCHLORIDE 1 G/1
TABLET, FILM COATED ORAL
Qty: 90 TABLET | Refills: 0 | Status: SHIPPED | OUTPATIENT
Start: 2024-03-25

## 2024-04-01 RX ORDER — LORATADINE AND PSEUDOEPHEDRINE SULFATE 10; 240 MG/1; MG/1
1 TABLET, FILM COATED, EXTENDED RELEASE ORAL DAILY
Qty: 30 TABLET | Refills: 0 | Status: SHIPPED | OUTPATIENT
Start: 2024-04-01 | End: 2024-04-01

## 2024-04-01 RX ORDER — LORATADINE AND PSEUDOEPHEDRINE SULFATE 10; 240 MG/1; MG/1
1 TABLET, FILM COATED, EXTENDED RELEASE ORAL DAILY
Qty: 90 TABLET | Refills: 1 | Status: SHIPPED | OUTPATIENT
Start: 2024-04-01

## 2024-04-01 NOTE — TELEPHONE ENCOUNTER
Caller: Ivis Mcginnis    Relationship: Self    Best call back number:  [phone] (Mobile)     Requested Prescriptions:   Requested Prescriptions     Pending Prescriptions Disp Refills    Allergy Relief/Nasal Decongest  MG per 24 hr tablet [Pharmacy Med Name: Allergy Relief/Nasal Decongest  MG Oral Tablet Extended Release 24 Hour] 30 tablet 0     Sig: Take 1 tablet by mouth once daily        loratadine-pseudoephedrine (CLARITIN-D 24-hour)  MG per 24 hr tablet   PATIENT IS REQUESTING A 90 DAY SUPPLY OF THIS MEDICATION     Pharmacy where request should be sent: [facility] - [facility] - [place], [place] - [address] [facility] [phone] [facility] [phone]      Last office visit with prescribing clinician: 3/5/2024   Last telemedicine visit with prescribing clinician: Visit date not found   Next office visit with prescribing clinician: 4/3/2024       Does the patient have less than a 3 day supply:  [x] Yes  [] No    Would you like a call back once the refill request has been completed: [] Yes [x] No    If the office needs to give you a call back, can they leave a voicemail: [] Yes [x] No

## 2024-04-03 ENCOUNTER — OFFICE VISIT (OUTPATIENT)
Dept: FAMILY MEDICINE CLINIC | Facility: CLINIC | Age: 54
End: 2024-04-03
Payer: COMMERCIAL

## 2024-04-03 VITALS
HEART RATE: 67 BPM | WEIGHT: 134 LBS | TEMPERATURE: 97.8 F | BODY MASS INDEX: 22.33 KG/M2 | SYSTOLIC BLOOD PRESSURE: 110 MMHG | HEIGHT: 65 IN | RESPIRATION RATE: 16 BRPM | DIASTOLIC BLOOD PRESSURE: 70 MMHG | OXYGEN SATURATION: 99 %

## 2024-04-03 DIAGNOSIS — S46.811A TRAPEZIUS MUSCLE STRAIN, RIGHT, INITIAL ENCOUNTER: Primary | ICD-10-CM

## 2024-04-03 RX ORDER — CYCLOBENZAPRINE HCL 10 MG
TABLET ORAL
Qty: 30 TABLET | Refills: 0 | Status: SHIPPED | OUTPATIENT
Start: 2024-04-03

## 2024-04-03 RX ORDER — NAPROXEN 500 MG/1
500 TABLET ORAL 2 TIMES DAILY WITH MEALS
Qty: 60 TABLET | Refills: 0 | Status: SHIPPED | OUTPATIENT
Start: 2024-04-03

## 2024-04-03 RX ORDER — NITROFURANTOIN 25; 75 MG/1; MG/1
CAPSULE ORAL
COMMUNITY
Start: 2024-04-01

## 2024-04-03 NOTE — PROGRESS NOTES
Subjective   Ivis Mcginnis is a 53 y.o. female.     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    Review of Systems   Constitutional: Negative.    Respiratory: Negative.     Cardiovascular: Negative.    Psychiatric/Behavioral: Negative.         Objective   Physical Exam  Vitals and nursing note reviewed.   Constitutional:       General: She is not in acute distress.     Appearance: Normal appearance. She is well-developed.   Cardiovascular:      Rate and Rhythm: Normal rate and regular rhythm.      Heart sounds: Normal heart sounds.   Pulmonary:      Effort: Pulmonary effort is normal.      Breath sounds: Normal breath sounds.   Musculoskeletal:        Arms:    Neurological:      Mental Status: She is alert and oriented to person, place, and time.   Psychiatric:         Mood and Affect: Mood normal.         Behavior: Behavior normal.         Thought Content: Thought content normal.         Judgment: Judgment normal.       Assessment & Plan   Diagnoses and all orders for this visit:    1. Trapezius muscle strain, right, initial encounter (Primary)  -     naproxen (Naprosyn) 500 MG tablet; Take 1 tablet by mouth 2 (Two) Times a Day With Meals.  Dispense: 60 tablet; Refill: 0  -     cyclobenzaprine (FLEXERIL) 10 MG tablet; 1 PO QHS  Dispense: 30 tablet; Refill: 0      Shoulder appears normal, I feel that pain is  coming from trapezius muscle based on PE.    Will treat with naproxen BID and flexeril QHS    Discussed PT in future INB and pt to call back INB

## 2024-04-15 ENCOUNTER — TELEPHONE (OUTPATIENT)
Dept: FAMILY MEDICINE CLINIC | Facility: CLINIC | Age: 54
End: 2024-04-15

## 2024-04-15 DIAGNOSIS — Z12.11 SCREEN FOR COLON CANCER: Primary | ICD-10-CM

## 2024-04-15 NOTE — TELEPHONE ENCOUNTER
Caller: Ras Ivis Thi    Relationship: Self    Best call back number: [phone]     What is the medical concern/diagnosis: COLONOSCOPY    What specialty or service is being requested: REFERRAL FOR COLONOSCOPY    What is the provider, practice or medical service name: DR. LISBETH BRADLEY     What is the office location: [place] [facility]    What is the office phone number: NA    Any additional details: PATIENT WOULD LIKE TO SEEN BY DR. LISBETH BRADLEY FOR HER COLONOSCOPY.

## 2024-04-18 ENCOUNTER — OFFICE VISIT (OUTPATIENT)
Dept: OBSTETRICS AND GYNECOLOGY | Facility: CLINIC | Age: 54
End: 2024-04-18
Payer: COMMERCIAL

## 2024-04-18 VITALS
BODY MASS INDEX: 22.23 KG/M2 | DIASTOLIC BLOOD PRESSURE: 78 MMHG | SYSTOLIC BLOOD PRESSURE: 112 MMHG | WEIGHT: 133.4 LBS | HEIGHT: 65 IN

## 2024-04-18 DIAGNOSIS — Z12.11 SCREENING FOR COLON CANCER: ICD-10-CM

## 2024-04-18 DIAGNOSIS — Z01.419 ENCOUNTER FOR GYNECOLOGICAL EXAMINATION WITHOUT ABNORMAL FINDING: Primary | ICD-10-CM

## 2024-04-18 DIAGNOSIS — Z79.890 POSTMENOPAUSAL HORMONE REPLACEMENT THERAPY: ICD-10-CM

## 2024-04-18 RX ORDER — ESTRADIOL 1 MG/1
1 TABLET ORAL DAILY
Qty: 90 TABLET | Refills: 3 | Status: SHIPPED | OUTPATIENT
Start: 2024-04-18

## 2024-04-18 NOTE — PROGRESS NOTES
Chief Complaint  Ivis Mcginnis is a 53 y.o.  female presenting for Annual Exam and Med Refill (Estradiol 1.5 mg (#135 with refills).  [facility].)    History of Present Illness  Ivis is a 53-year-old woman, , here for annual GYN exam.  Her past surgical history includes a D&C with hysteroscopy, LAVH  Oral estrogen replacement therapy tolerated well, without bothersome side effects.    Hypothyroidism compensated  History of bone marrow transplant for leukemia; now in remission.  History of osteopenia; she is intentional about calcium and vitamin D lots of walking every day.  Otherwise ROS negative    Preventive health procedures up-to-date  Will be due colon cancer screening and she is willing to do colonoscopy this year        The following portions of the patient's history were reviewed and updated as appropriate: allergies, current medications, past family history, past medical history, past social history, past surgical history, and problem list.    Allergies   Allergen Reactions    Dezocine Other (See Comments)     Severe headache    Pentamidine Unknown - Low Severity    Piperacillin Sod-Tazobactam So      Zosin         Current Outpatient Medications:     ascorbic acid (VITAMIN C) 1000 MG tablet, Take 1 tablet by mouth Daily., Disp: , Rfl:     Biotin 17537 MCG tablet, Take 1 tablet by mouth Daily., Disp: , Rfl:     Calcium Carb-Cholecalciferol (CALCIUM 600+D3 PO), Take 1 tablet by mouth Daily., Disp: , Rfl:     Cholecalciferol (VITAMIN D) 2000 UNITS capsule, Take  by mouth., Disp: , Rfl:     cyclobenzaprine (FLEXERIL) 10 MG tablet, 1 PO QHS, Disp: 30 tablet, Rfl: 0    estradiol (Estrace) 1 MG tablet, Take 1 tablet by mouth Daily., Disp: 90 tablet, Rfl: 3    fluocinonide (LIDEX) 0.05 % external solution, APPLY TWICE DAILY MONDAY - THURSDAY TO NAIL MATRICES OF AFFECTED NAILS, Disp: , Rfl:     levothyroxine (SYNTHROID, LEVOTHROID) 75 MCG tablet, Take 1 tablet by mouth Daily., Disp: 90  "tablet, Rfl: 3    loratadine-pseudoephedrine (Allergy Relief/Nasal Decongest)  MG per 24 hr tablet, Take 1 tablet by mouth Daily., Disp: 90 tablet, Rfl: 1    Multiple Vitamins-Minerals (MULTIVITAMIN ADULT PO), Take 1 tablet by mouth Daily., Disp: , Rfl:     naproxen (Naprosyn) 500 MG tablet, Take 1 tablet by mouth 2 (Two) Times a Day With Meals., Disp: 60 tablet, Rfl: 0    nitrofurantoin, macrocrystal-monohydrate, (MACROBID) 100 MG capsule, , Disp: , Rfl:     topiramate (TOPAMAX) 50 MG tablet, Take 1 tablet by mouth 2 (Two) Times a Day., Disp: 180 tablet, Rfl: 1    tretinoin (RETIN-A) 0.05 % cream, APPLY TO THE AFFECTED AREA(S) TOPICALLY 2-3 NIGHTS PER WEEK. PEA SIZE AMOUNT TO ENTIRE FACE., Disp: , Rfl:     valACYclovir (VALTREX) 1000 MG tablet, Take 1 tablet by mouth once daily, Disp: 90 tablet, Rfl: 0    Past Medical History:   Diagnosis Date    Dry eye     Eczema     Fibrocystic breast changes, bilateral     GVH (graft versus host disease)     History of recurrent UTIs     Hypothyroidism     Leukemia     Migraine syndrome     Osteopenia     Post-menopause on HRT (hormone replacement therapy)     Rash in adult         Past Surgical History:   Procedure Laterality Date    BONE MARROW TRANSPLANT  12/2009    D & C HYSTEROSCOPY      polypectomy    LAPAROSCOPIC ASSISTED VAGINAL HYSTERECTOMY      LAPAROSCOPY WITH LASER      PORTACATH PLACEMENT         Objective  /78   Ht 165.1 cm (65\")   Wt 60.5 kg (133 lb 6.4 oz)   Breastfeeding No   BMI 22.20 kg/m²     Physical Exam  Vitals and nursing note reviewed. Exam conducted with a chaperone present.   Constitutional:       General: She is not in acute distress.     Appearance: Normal appearance. She is not ill-appearing.   HENT:      Head: Normocephalic.   Neck:      Thyroid: No thyroid mass or thyromegaly.   Cardiovascular:      Rate and Rhythm: Normal rate and regular rhythm.      Heart sounds: Normal heart sounds. No murmur heard.  Pulmonary:      Effort: " Pulmonary effort is normal. No respiratory distress.      Breath sounds: Normal breath sounds.   Chest:   Breasts:     Right: No inverted nipple, mass or nipple discharge.      Left: No inverted nipple, mass or nipple discharge.   Abdominal:      Palpations: Abdomen is soft. There is no mass.      Tenderness: There is no abdominal tenderness.   Genitourinary:     General: Normal vulva.      Labia:         Right: No rash, tenderness or lesion.         Left: No rash, tenderness or lesion.       Vagina: No vaginal discharge or erythema.      Uterus: Absent.       Adnexa:         Right: No mass or tenderness.          Left: No mass or tenderness.        Comments: Anus appears wnl.  No rectal exam performed.  Lymphadenopathy:      Upper Body:      Right upper body: No supraclavicular or axillary adenopathy.      Left upper body: No supraclavicular or axillary adenopathy.   Skin:     General: Skin is warm and dry.   Neurological:      Mental Status: She is alert and oriented to person, place, and time.   Psychiatric:         Mood and Affect: Mood normal.         Behavior: Behavior normal.         Assessment/Plan   Diagnoses and all orders for this visit:    1. Encounter for gynecological examination without abnormal finding (Primary)    2. Postmenopausal hormone replacement therapy    3. Screening for colon cancer  -     Ambulatory Referral For Screening Colonoscopy    Other orders  -     estradiol (Estrace) 1 MG tablet; Take 1 tablet by mouth Daily.  Dispense: 90 tablet; Refill: 3    Is willing to decrease ERT dose from 1.5 to 1 mg daily.  Counseled about risks and benefits of ERT.      Procedures    40 to 64: Counseling/Anticipatory Guidance Discussed: nutrition, physical activity, screenings, self-breast exam, and ERT risks & benefits.    Return in about 1 year (around 4/18/2025) for Annual physical.    Mare Lewis, DOMITILA  04/18/2024

## 2024-05-28 ENCOUNTER — TELEPHONE (OUTPATIENT)
Dept: OBSTETRICS AND GYNECOLOGY | Facility: CLINIC | Age: 54
End: 2024-05-28
Payer: COMMERCIAL

## 2024-05-28 NOTE — TELEPHONE ENCOUNTER
She could do that if she wishes.  Take 1.5 EVERY OTHER DAY.  And just 1 EVERY OTHER DAY.    If that doesn't work, she can go back to the 1.5 all the time.  (Let me know if she wishes to do that.) - DOMITILA Thornton     Called attempting to reach patient; no answer, LVM requesting call back and provided office call back number.

## 2024-05-28 NOTE — TELEPHONE ENCOUNTER
Called and spoke with patient, she states that she has only been taking 1 tablet (as opposed to the previous 1.5 tablets) starting on 4/18/2024.  She states over all she is managing okay but has had an increase in hot flashes occasionally and would like to know if there is some sort of middle ground (patient states such as taking a 1.5 tablet on certain days of the week or something?)

## 2024-05-29 NOTE — TELEPHONE ENCOUNTER
Called and spoke with patient and informed her of Maribel's recommendation.  She will try alternating the doses every other day and will let us know how that goes.

## 2024-06-03 DIAGNOSIS — G43.709 CHRONIC MIGRAINE WITHOUT AURA WITHOUT STATUS MIGRAINOSUS, NOT INTRACTABLE: ICD-10-CM

## 2024-06-03 RX ORDER — TOPIRAMATE 50 MG/1
50 TABLET, FILM COATED ORAL 2 TIMES DAILY
Qty: 180 TABLET | Refills: 0 | Status: SHIPPED | OUTPATIENT
Start: 2024-06-03

## 2024-06-04 ENCOUNTER — TELEPHONE (OUTPATIENT)
Dept: FAMILY MEDICINE CLINIC | Facility: CLINIC | Age: 54
End: 2024-06-04
Payer: COMMERCIAL

## 2024-06-04 DIAGNOSIS — G89.29 CHRONIC RIGHT SHOULDER PAIN: Primary | ICD-10-CM

## 2024-06-04 DIAGNOSIS — M25.511 CHRONIC RIGHT SHOULDER PAIN: Primary | ICD-10-CM

## 2024-06-04 DIAGNOSIS — S46.811A TRAPEZIUS MUSCLE STRAIN, RIGHT, INITIAL ENCOUNTER: ICD-10-CM

## 2024-06-04 NOTE — TELEPHONE ENCOUNTER
Per pt she was seen a few weeks ago for shoulder pain and was advised to call back in a few weeks if no better. She is actually feeling worse with pain in neck and going down arm. She is aware she might need to start PT however she wondering about a scan or even xray. She is concerned with her history of leukemia.

## 2024-06-04 NOTE — TELEPHONE ENCOUNTER
Caller: Ivis Mcginnis    Relationship: Self    Best call back number: [phone]     What is the best time to reach you: ANYTIME     Who are you requesting to speak with (clinical staff, provider,  specific staff member): CLINICAL STAFF    What was the call regarding: PATIENT IS CALLING IN REGARDS TO SOME PAIN THAT SHE IS HAVING IN HER RIGHT SHOULDER. SHE WOULD LIKE TO SPEAK WITH THE CLINICAL STAFF ABOUT IT.     Is it okay if the provider responds through [employer]: YES

## 2024-06-05 ENCOUNTER — HOSPITAL ENCOUNTER (OUTPATIENT)
Dept: GENERAL RADIOLOGY | Facility: HOSPITAL | Age: 54
Discharge: HOME | End: 2024-06-05
Admitting: FAMILY MEDICINE
Payer: COMMERCIAL

## 2024-06-05 DIAGNOSIS — M25.511 CHRONIC RIGHT SHOULDER PAIN: ICD-10-CM

## 2024-06-05 DIAGNOSIS — G89.29 CHRONIC RIGHT SHOULDER PAIN: ICD-10-CM

## 2024-06-05 DIAGNOSIS — S46.811A TRAPEZIUS MUSCLE STRAIN, RIGHT, INITIAL ENCOUNTER: ICD-10-CM

## 2024-06-05 PROCEDURE — 73030 X-RAY EXAM OF SHOULDER: CPT

## 2024-06-18 ENCOUNTER — TELEPHONE (OUTPATIENT)
Dept: FAMILY MEDICINE CLINIC | Facility: CLINIC | Age: 54
End: 2024-06-18
Payer: COMMERCIAL

## 2024-06-18 NOTE — TELEPHONE ENCOUNTER
Caller: Ivis Mcginnis    Relationship: Self    Best call back number:[phone]     What specialty or service is being requested: PHYSICAL THERAPY    What is the provider, practice or medical service name: [facility]    What is the office phone number: [phone]    Any additional details: THE PATIENT WAS REFERRED TO [facility] PHYSICAL THERAPY AND WOULD LIKE TO GO TO [facility].

## 2024-06-24 RX ORDER — VALACYCLOVIR HYDROCHLORIDE 1 G/1
TABLET, FILM COATED ORAL
Qty: 90 TABLET | Refills: 0 | Status: SHIPPED | OUTPATIENT
Start: 2024-06-24

## 2024-06-27 RX ORDER — SODIUM, POTASSIUM,MAG SULFATES 17.5-3.13G
SOLUTION, RECONSTITUTED, ORAL ORAL
Qty: 354 ML | Refills: 0 | Status: SHIPPED | OUTPATIENT
Start: 2024-06-27

## 2024-07-12 ENCOUNTER — OUTSIDE FACILITY SERVICE (OUTPATIENT)
Dept: GASTROENTEROLOGY | Facility: CLINIC | Age: 54
End: 2024-07-12
Payer: COMMERCIAL

## 2024-08-12 RX ORDER — LEVOTHYROXINE SODIUM 0.07 MG/1
75 TABLET ORAL DAILY
Qty: 30 TABLET | Refills: 0 | Status: SHIPPED | OUTPATIENT
Start: 2024-08-12

## 2024-08-13 ENCOUNTER — TELEPHONE (OUTPATIENT)
Dept: FAMILY MEDICINE CLINIC | Facility: CLINIC | Age: 54
End: 2024-08-13
Payer: COMMERCIAL

## 2024-08-13 RX ORDER — CIPROFLOXACIN 500 MG/1
500 TABLET, FILM COATED ORAL 2 TIMES DAILY
Qty: 6 TABLET | Refills: 0 | Status: SHIPPED | OUTPATIENT
Start: 2024-08-13

## 2024-08-13 NOTE — TELEPHONE ENCOUNTER
Caller: Ivis Mcginnis    Relationship: Self    Best call back number: [phone]    What medication are you requesting: SOMETHING FOR A BLADDER INFECTION OR UTI     What are your current symptoms: BLOOD IN URINE PAINFUL URINATION FREQUENT URGE TO URINATE     How long have you been experiencing symptoms: SINCE LAST NIGHT     Have you had these symptoms before:    [] Yes  [x] No    Have you been treated for these symptoms before:   [] Yes  [x] No    If a prescription is needed, what is your preferred pharmacy and phone number: [id number] - [facility] - [place], [place] - [address] [facility] [phone] [facility] [phone]      Additional notes:  THE PATIENT WOULD LIKE TO KNOW IF THE DOCTOR CAN CALL SOMETHING IN FOR HER PLEASE CALL PATIENT TO LET HER KNOW

## 2024-08-28 ENCOUNTER — OFFICE VISIT (OUTPATIENT)
Dept: FAMILY MEDICINE CLINIC | Facility: CLINIC | Age: 54
End: 2024-08-28
Payer: COMMERCIAL

## 2024-08-28 VITALS
RESPIRATION RATE: 14 BRPM | SYSTOLIC BLOOD PRESSURE: 100 MMHG | HEART RATE: 59 BPM | BODY MASS INDEX: 22.39 KG/M2 | OXYGEN SATURATION: 100 % | WEIGHT: 134.4 LBS | DIASTOLIC BLOOD PRESSURE: 60 MMHG | HEIGHT: 65 IN | TEMPERATURE: 98 F

## 2024-08-28 DIAGNOSIS — R53.83 OTHER FATIGUE: Primary | ICD-10-CM

## 2024-08-28 DIAGNOSIS — J06.9 ACUTE UPPER RESPIRATORY INFECTION: ICD-10-CM

## 2024-08-28 LAB
EXPIRATION DATE: NORMAL
FLUAV AG NPH QL: NEGATIVE
FLUBV AG NPH QL: NEGATIVE
INTERNAL CONTROL: NORMAL
Lab: NORMAL
S PYO AG THROAT QL: NEGATIVE
SARS-COV-2 AG UPPER RESP QL IA.RAPID: NOT DETECTED

## 2024-08-28 RX ORDER — AZITHROMYCIN 250 MG/1
TABLET, FILM COATED ORAL
Qty: 6 TABLET | Refills: 0 | Status: SHIPPED | OUTPATIENT
Start: 2024-08-28

## 2024-08-28 NOTE — PROGRESS NOTES
Date: 2024   Patient Name: Ivis Mcginnis  : 1970   MRN: [id number]     Chief Complaint:    Chief Complaint   Patient presents with    Illness     Sinus pressure, cough, headache, sore throat, body aches. Symptoms started .       History of Present Illness: Ivis Mcginnis is a 54 y.o. 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            Review of Systems:   Review of Systems   Constitutional:  Positive for fatigue.   HENT:  Positive for congestion, sinus pressure and sore throat.    Musculoskeletal:  Positive for myalgias.   Neurological:  Positive for headache.       Past Medical History:   Past Medical History:   Diagnosis Date    Dry eye     Eczema     Fibrocystic breast changes, bilateral     GVH (graft versus host disease)     History of recurrent UTIs     Hypothyroidism     Leukemia     Migraine syndrome     Osteopenia     Post-menopause on HRT (hormone replacement therapy)     Rash in adult        Past Surgical History:   Past Surgical History:   Procedure Laterality Date    BONE MARROW TRANSPLANT  2009    D & C HYSTEROSCOPY      polypectomy    LAPAROSCOPIC ASSISTED VAGINAL HYSTERECTOMY      LAPAROSCOPY WITH LASER      PORTACATH PLACEMENT         Family History:   Family History   Problem Relation Age of Onset    Kidney disease Mother             Diabetes Father     Hypertension Father     Coronary artery disease Father        Social History:   Social History     Socioeconomic History    Marital status:    Tobacco Use    Smoking status: Never    Smokeless tobacco: Never   Vaping Use    Vaping status: Never Used   Substance and Sexual Activity    Alcohol use: Yes     Comment: socially  "   Drug use: No    Sexual activity: Yes     Partners: Male     Birth control/protection: Surgical, Post-menopausal     Comment:        Medications:     Current Outpatient Medications:     ascorbic acid (VITAMIN C) 1000 MG tablet, Take 1 tablet by mouth Daily., Disp: , Rfl:     Biotin 29058 MCG tablet, Take 1 tablet by mouth Daily., Disp: , Rfl:     Calcium Carb-Cholecalciferol (CALCIUM 600+D3 PO), Take 1 tablet by mouth Daily., Disp: , Rfl:     Cholecalciferol (VITAMIN D) 2000 UNITS capsule, Take  by mouth., Disp: , Rfl:     estradiol (Estrace) 1 MG tablet, Take 1 tablet by mouth Daily., Disp: 90 tablet, Rfl: 3    levothyroxine (SYNTHROID, LEVOTHROID) 75 MCG tablet, Take 1 tablet by mouth once daily, Disp: 30 tablet, Rfl: 0    loratadine-pseudoephedrine (Allergy Relief/Nasal Decongest)  MG per 24 hr tablet, Take 1 tablet by mouth Daily., Disp: 90 tablet, Rfl: 1    Multiple Vitamins-Minerals (MULTIVITAMIN ADULT PO), Take 1 tablet by mouth Daily., Disp: , Rfl:     topiramate (TOPAMAX) 50 MG tablet, Take 1 tablet by mouth twice daily, Disp: 180 tablet, Rfl: 0    valACYclovir (VALTREX) 1000 MG tablet, Take 1 tablet by mouth once daily, Disp: 90 tablet, Rfl: 0    azithromycin (Zithromax Z-Cipriano) 250 MG tablet, Take 2 tablets by mouth on day 1, then 1 tablet daily on days 2-5, Disp: 6 tablet, Rfl: 0    Allergies:   Allergies   Allergen Reactions    Dezocine Other (See Comments)     Severe headache    Pentamidine Unknown - Low Severity    Piperacillin Sod-Tazobactam So      Zosin         Physical Exam:  Vital Signs:   Vitals:    08/28/24 1620   BP: 100/60   Pulse: 59   Resp: 14   Temp: 98 °F (36.7 °C)   SpO2: 100%   Weight: 61 kg (134 lb 6.4 oz)   Height: 165.1 cm (65\")     Body mass index is 22.37 kg/m².     Physical Exam  Vitals and nursing note reviewed.   Constitutional:       Appearance: She is not ill-appearing.   HENT:      Head: Normocephalic and atraumatic.      Right Ear: External ear normal. No middle " ear effusion. Tympanic membrane is not erythematous or bulging.      Left Ear: External ear normal.  No middle ear effusion. Tympanic membrane is not erythematous or bulging.      Nose: Nasal tenderness and congestion present.      Right Turbinates: Not enlarged or swollen.      Left Turbinates: Not enlarged or swollen.      Right Sinus: No maxillary sinus tenderness.      Left Sinus: No maxillary sinus tenderness.      Mouth/Throat:      Mouth: Mucous membranes are moist.      Pharynx: No pharyngeal swelling or posterior oropharyngeal erythema.      Tonsils: No tonsillar exudate.   Eyes:      Pupils: Pupils are equal, round, and reactive to light.   Cardiovascular:      Rate and Rhythm: Normal rate and regular rhythm.   Pulmonary:      Effort: Pulmonary effort is normal.      Breath sounds: Normal breath sounds.   Abdominal:      General: Bowel sounds are normal.   Musculoskeletal:      Cervical back: Normal range of motion and neck supple.   Skin:     General: Skin is warm.   Neurological:      General: No focal deficit present.      Mental Status: She is alert and oriented to person, place, and time.   Psychiatric:         Mood and Affect: Mood normal.           Assessment/Plan:   Diagnoses and all orders for this visit:    1. Other fatigue (Primary)  -     POCT rapid strep A  -     POCT SARS-CoV-2 Antigen ELIZABETH  -     POCT Influenza A/B    2. Acute upper respiratory infection  -     azithromycin (Zithromax Z-Cipriano) 250 MG tablet; Take 2 tablets by mouth on day 1, then 1 tablet daily on days 2-5  Dispense: 6 tablet; Refill: 0       Educated on viral versus bacterial infection.  At this time treating for virus.  Educated possibly tested too early for COVID and flu due to symptoms only starting yesterday.  Increase fluid intake  Take medication as prescribed in plan  Monitor for worsening symptoms  Tylenol and ibuprofen as needed for aches and fever.  Go to the ER for any shortness of breath, chest pains, fever  uncontrolled by medications.      Follow Up:   Return if symptoms worsen or fail to improve.    Steff Santizo. DOMITILA   [facility]

## 2024-09-03 ENCOUNTER — OFFICE VISIT (OUTPATIENT)
Dept: FAMILY MEDICINE CLINIC | Facility: CLINIC | Age: 54
End: 2024-09-03
Payer: COMMERCIAL

## 2024-09-03 VITALS
SYSTOLIC BLOOD PRESSURE: 118 MMHG | RESPIRATION RATE: 14 BRPM | HEIGHT: 65 IN | OXYGEN SATURATION: 100 % | HEART RATE: 43 BPM | TEMPERATURE: 97.1 F | DIASTOLIC BLOOD PRESSURE: 64 MMHG | WEIGHT: 134.8 LBS | BODY MASS INDEX: 22.46 KG/M2

## 2024-09-03 DIAGNOSIS — R35.0 URINARY FREQUENCY: Primary | ICD-10-CM

## 2024-09-03 DIAGNOSIS — N30.01 ACUTE CYSTITIS WITH HEMATURIA: ICD-10-CM

## 2024-09-03 LAB
BILIRUB BLD-MCNC: NEGATIVE MG/DL
CLARITY, POC: CLEAR
COLOR UR: YELLOW
EXPIRATION DATE: ABNORMAL
GLUCOSE UR STRIP-MCNC: NEGATIVE MG/DL
KETONES UR QL: NEGATIVE
LEUKOCYTE EST, POC: ABNORMAL
Lab: ABNORMAL
NITRITE UR-MCNC: NEGATIVE MG/ML
PH UR: 6 [PH] (ref 5–8)
PROT UR STRIP-MCNC: ABNORMAL MG/DL
RBC # UR STRIP: ABNORMAL /UL
SP GR UR: 1.03 (ref 1–1.03)
UROBILINOGEN UR QL: ABNORMAL

## 2024-09-03 PROCEDURE — 99214 OFFICE O/P EST MOD 30 MIN: CPT | Performed by: NURSE PRACTITIONER

## 2024-09-03 PROCEDURE — 81003 URINALYSIS AUTO W/O SCOPE: CPT | Performed by: NURSE PRACTITIONER

## 2024-09-03 RX ORDER — PHENAZOPYRIDINE HYDROCHLORIDE 200 MG/1
200 TABLET, FILM COATED ORAL 3 TIMES DAILY PRN
Qty: 6 TABLET | Refills: 0 | Status: SHIPPED | OUTPATIENT
Start: 2024-09-03 | End: 2024-09-05

## 2024-09-03 RX ORDER — NITROFURANTOIN 25; 75 MG/1; MG/1
100 CAPSULE ORAL 2 TIMES DAILY
Qty: 14 CAPSULE | Refills: 0 | Status: SHIPPED | OUTPATIENT
Start: 2024-09-03 | End: 2024-09-10

## 2024-09-03 NOTE — PROGRESS NOTES
Date: 2024   Patient Name: Ivis Mcginnis  : 1970   MRN: [id number]     Chief Complaint:    Chief Complaint   Patient presents with    Urinary Frequency     Blood in urine, body aches, lower back pain       History of Present Illness: Ivis Mcginnis is a 54 y.o. female who is here today for Body aches, urinary frequency, low back pain, hematuria, dysuria that started yesterday  No other associated symptoms  Does report recent intercourse with  and she can be prone to UTIs after intercourse  Not taking OTC medications to aid in symptoms.         Review of Systems:   Review of Systems   Constitutional:  Positive for fatigue.   Genitourinary:  Positive for dysuria, frequency and hematuria.       Past Medical History:   Past Medical History:   Diagnosis Date    Dry eye     Eczema     Fibrocystic breast changes, bilateral     GVH (graft versus host disease)     History of recurrent UTIs     Hypothyroidism     Leukemia     Migraine syndrome     Osteopenia     Post-menopause on HRT (hormone replacement therapy)     Rash in adult        Past Surgical History:   Past Surgical History:   Procedure Laterality Date    BONE MARROW TRANSPLANT  2009    D & C HYSTEROSCOPY      polypectomy    LAPAROSCOPIC ASSISTED VAGINAL HYSTERECTOMY      LAPAROSCOPY WITH LASER      PORTACATH PLACEMENT         Family History:   Family History   Problem Relation Age of Onset    Kidney disease Mother             Diabetes Father     Hypertension Father     Coronary artery disease Father        Social History:   Social History     Socioeconomic History    Marital status:    Tobacco Use    Smoking status: Never    Smokeless tobacco: Never   Vaping Use    Vaping status: Never Used   Substance and Sexual Activity    Alcohol use: Yes     Comment: socially    Drug use: No    Sexual activity: Yes     Partners: Male     Birth control/protection: Surgical, Post-menopausal     Comment:        Medications:  "    Current Outpatient Medications:     ascorbic acid (VITAMIN C) 1000 MG tablet, Take 1 tablet by mouth Daily., Disp: , Rfl:     Biotin 33113 MCG tablet, Take 1 tablet by mouth Daily., Disp: , Rfl:     Calcium Carb-Cholecalciferol (CALCIUM 600+D3 PO), Take 1 tablet by mouth Daily., Disp: , Rfl:     Cholecalciferol (VITAMIN D) 2000 UNITS capsule, Take  by mouth., Disp: , Rfl:     estradiol (Estrace) 1 MG tablet, Take 1 tablet by mouth Daily., Disp: 90 tablet, Rfl: 3    levothyroxine (SYNTHROID, LEVOTHROID) 75 MCG tablet, Take 1 tablet by mouth once daily, Disp: 30 tablet, Rfl: 0    loratadine-pseudoephedrine (Allergy Relief/Nasal Decongest)  MG per 24 hr tablet, Take 1 tablet by mouth Daily., Disp: 90 tablet, Rfl: 1    Multiple Vitamins-Minerals (MULTIVITAMIN ADULT PO), Take 1 tablet by mouth Daily., Disp: , Rfl:     topiramate (TOPAMAX) 50 MG tablet, Take 1 tablet by mouth twice daily, Disp: 180 tablet, Rfl: 0    valACYclovir (VALTREX) 1000 MG tablet, Take 1 tablet by mouth once daily, Disp: 90 tablet, Rfl: 0    nitrofurantoin, macrocrystal-monohydrate, (MACROBID) 100 MG capsule, Take 1 capsule by mouth 2 (Two) Times a Day for 7 days., Disp: 14 capsule, Rfl: 0    phenazopyridine (Pyridium) 200 MG tablet, Take 1 tablet by mouth 3 (Three) Times a Day As Needed for Bladder Spasms or Dysuria for up to 2 days., Disp: 6 tablet, Rfl: 0    Allergies:   Allergies   Allergen Reactions    Dezocine Other (See Comments)     Severe headache    Pentamidine Unknown - Low Severity    Piperacillin Sod-Tazobactam So      Zosin         Physical Exam:  Vital Signs:   Vitals:    09/03/24 1032   BP: 118/64   Pulse: (!) 43   Resp: 14   Temp: 97.1 °F (36.2 °C)   SpO2: 100%   Weight: 61.1 kg (134 lb 12.8 oz)   Height: 165.1 cm (65\")     Body mass index is 22.43 kg/m².     Physical Exam  Vitals and nursing note reviewed.   Constitutional:       Appearance: Normal appearance.   HENT:      Head: Normocephalic and atraumatic. "   Cardiovascular:      Rate and Rhythm: Normal rate and regular rhythm.   Pulmonary:      Effort: Pulmonary effort is normal. No respiratory distress.      Breath sounds: Normal breath sounds.   Abdominal:      General: Bowel sounds are normal.      Palpations: Abdomen is soft.      Tenderness: There is abdominal tenderness in the suprapubic area. There is no right CVA tenderness or left CVA tenderness.   Musculoskeletal:         General: Normal range of motion.   Skin:     General: Skin is warm.   Neurological:      General: No focal deficit present.      Mental Status: She is alert and oriented to person, place, and time.   Psychiatric:         Mood and Affect: Mood normal.           Assessment/Plan:   Diagnoses and all orders for this visit:    1. Urinary frequency (Primary)  -     POCT urinalysis dipstick, automated  -     Urine Culture - Urine, Urine, Clean Catch; Future  -     Urine Culture - Urine, Urine, Clean Catch    2. Acute cystitis with hematuria  -     nitrofurantoin, macrocrystal-monohydrate, (MACROBID) 100 MG capsule; Take 1 capsule by mouth 2 (Two) Times a Day for 7 days.  Dispense: 14 capsule; Refill: 0  -     phenazopyridine (Pyridium) 200 MG tablet; Take 1 tablet by mouth 3 (Three) Times a Day As Needed for Bladder Spasms or Dysuria for up to 2 days.  Dispense: 6 tablet; Refill: 0    Stable in clinic today   UTI education  Take medications as prescribed within chart today  UA positive in clinic  Urine culture ordered  Monitor for worsening symptoms  Increase water intake  Wear cotton underwear  Go to the ER with temp of 103 or greater, severe abdominal pain, back pain, nausea and diarrhea.      Follow Up:   Return if symptoms worsen or fail to improve.    Steff Santizo. DOMITILA RAMIRES [facility]

## 2024-09-05 ENCOUNTER — TELEPHONE (OUTPATIENT)
Dept: FAMILY MEDICINE CLINIC | Facility: CLINIC | Age: 54
End: 2024-09-05
Payer: COMMERCIAL

## 2024-09-05 NOTE — TELEPHONE ENCOUNTER
Caller: Ivis Mcginnis    Relationship: Self    Best call back number:     [phone]       What is the best time to reach you: ANYTIME ON 09/05/24    Who are you requesting to speak with (clinical staff, provider,  specific staff member): CLINICAL STAFF    What was the call regarding: THE PATIENT WAS SEEN ON 09/03/24 AND GIVEN ANTIBIOTICS. SHE IS STILL EXPERIENCING BACK PAIN AND IS WONDERING IF SHE IS ON THE RIGHT MEDICATION FOR HER ISSUE?

## 2024-09-06 LAB
BACTERIA UR CULT: ABNORMAL
BACTERIA UR CULT: ABNORMAL
OTHER ANTIBIOTIC SUSC ISLT: ABNORMAL

## 2024-09-06 NOTE — TELEPHONE ENCOUNTER
Provider: DR RUTHERFORD    Caller: HENNY LEA    Relationship to Patient: PATIENT    Phone Number: [phone]     Reason for Call: PATIENT HAS REQUESTED TO DROP OFF A URINE SPECIMEN ON 09/06/24 DUE TO FEELING LIKE SHE HAS A KIDNEY INFECTION.

## 2024-09-06 NOTE — TELEPHONE ENCOUNTER
Patient thinks she may have a kidney stone. She feels her symptoms have not improved at all and the pain has gotten worse in her back.  Per Steff's direction, pt is going to go to ER to be evaluated. Pt verbalized understanding.

## 2024-09-09 ENCOUNTER — TELEPHONE (OUTPATIENT)
Dept: FAMILY MEDICINE CLINIC | Facility: CLINIC | Age: 54
End: 2024-09-09
Payer: COMMERCIAL

## 2024-09-09 DIAGNOSIS — N30.01 ACUTE CYSTITIS WITH HEMATURIA: Primary | ICD-10-CM

## 2024-09-09 RX ORDER — CIPROFLOXACIN 500 MG/1
500 TABLET, FILM COATED ORAL 2 TIMES DAILY
Qty: 10 TABLET | Refills: 0 | Status: SHIPPED | OUTPATIENT
Start: 2024-09-09 | End: 2024-09-14

## 2024-09-09 NOTE — TELEPHONE ENCOUNTER
Patient is concerned that they may not be on the right antibiotics and is still having symptoms. Would like to speak to someone in clinical. Stated they are having back pain stronger sharp pains on the left side. But pain is all over all the time.     Please advise  [phone]

## 2024-09-12 ENCOUNTER — OFFICE VISIT (OUTPATIENT)
Dept: ENDOCRINOLOGY | Facility: CLINIC | Age: 54
End: 2024-09-12
Payer: COMMERCIAL

## 2024-09-12 VITALS
HEART RATE: 69 BPM | DIASTOLIC BLOOD PRESSURE: 68 MMHG | OXYGEN SATURATION: 100 % | SYSTOLIC BLOOD PRESSURE: 110 MMHG | BODY MASS INDEX: 22.33 KG/M2 | WEIGHT: 134 LBS | HEIGHT: 65 IN

## 2024-09-12 DIAGNOSIS — E03.9 ACQUIRED HYPOTHYROIDISM: Primary | Chronic | ICD-10-CM

## 2024-09-12 PROCEDURE — 84443 ASSAY THYROID STIM HORMONE: CPT | Performed by: INTERNAL MEDICINE

## 2024-09-12 PROCEDURE — 99213 OFFICE O/P EST LOW 20 MIN: CPT | Performed by: INTERNAL MEDICINE

## 2024-09-12 RX ORDER — LEVOTHYROXINE SODIUM 75 UG/1
75 TABLET ORAL DAILY
Qty: 90 TABLET | Refills: 3 | Status: SHIPPED | OUTPATIENT
Start: 2024-09-12

## 2024-09-12 NOTE — PROGRESS NOTES
"     Office Note      Date: 2024  Patient Name: Ivis Mcginnis  MRN: [id number]  : 1970    Chief Complaint   Patient presents with    Hypothyroidism       History of Present Illness:   Ivis Mcginnis is a 54 y.o. female who presents for Hypothyroidism    She remains on T4 75mcg qd. She is taking this correctly. She isn't taking any interfering meds concurrently.  She hasn't noted any change in the size of her neck. She denies any compressive sxs. She denies any sxs of hypo- or hyperthyroidism at this time, aside from fatigue but she is dealing with UTI.     Subjective      Review of Systems:   Review of Systems   Constitutional:  Positive for fatigue.   Cardiovascular: Negative.    Gastrointestinal: Negative.    Endocrine: Negative.        The following portions of the patient's history were reviewed and updated as appropriate: allergies, current medications, past family history, past medical history, past social history, past surgical history, and problem list.    Objective     Visit Vitals  /68 (BP Location: Right arm, Patient Position: Sitting, Cuff Size: Adult)   Pulse 69   Ht 165.1 cm (65\")   Wt 60.8 kg (134 lb)   SpO2 100%   BMI 22.30 kg/m²       Physical Exam:  Physical Exam  Constitutional:       Appearance: Normal appearance.   Neck:      Thyroid: No thyroid mass, thyromegaly or thyroid tenderness.   Lymphadenopathy:      Cervical: No cervical adenopathy.   Neurological:      Mental Status: She is alert.         Labs:    TSH  No results found for: \"TSHBASE\"     Free T4  Free T4   Date Value Ref Range Status   2021 1.39 0.93 - 1.70 ng/dL Final       T3  No results found for: \"Z9YYLWT\"      TPO  No results found for: \"THYROIDAB\"    TG AB  No results found for: \"THGAB\"    TG  No results found for: \"THYROGLB\"    CBC w/DIFF  Lab Results   Component Value Date    WBC 5.31 2023    RBC 3.90 2023    HGB 14.3 2023    HCT 42.2 2023    .2 (H) 2023    " MCH 36.7 (H) 07/06/2023    MCHC 33.9 07/06/2023    RDW 12.3 07/06/2023    RDWSD 46.9 07/13/2022    MPV 10.3 07/13/2022     07/06/2023    NEUTRORELPCT CANCELED 07/06/2023    LYMPHORELPCT CANCELED 07/06/2023    MONORELPCT CANCELED 07/06/2023    EOSRELPCT CANCELED 07/06/2023    BASORELPCT 0.0 08/04/2021    AUTOIGPER 0.0 08/04/2021    NEUTROABS 2.47 07/06/2023    LYMPHSABS CANCELED 07/06/2023    LYMPHSABS 2.15 07/06/2023    MONOSABS 0.48 07/06/2023    EOSABS CANCELED 07/06/2023    EOSABS 3.0 07/06/2023    EOSABS 0.16 07/06/2023    BASOSABS CANCELED 07/06/2023    BASOSABS 0.05 07/06/2023    AUTOIGNUM 0.01 08/04/2021    NRBC 0.0 08/04/2021           Assessment / Plan      Assessment & Plan:  Diagnoses and all orders for this visit:    1. Acquired hypothyroidism (Primary)  Assessment & Plan:  Continue T4 tx.  Check TSH.  Will send note about results.    Orders:  -     TSH; Future    Other orders  -     levothyroxine (SYNTHROID, LEVOTHROID) 75 MCG tablet; Take 1 tablet by mouth Daily.  Dispense: 90 tablet; Refill: 3      Current Outpatient Medications   Medication Instructions    ascorbic acid (VITAMIN C) 1,000 mg, Oral, Daily    Biotin 83819 MCG tablet 1 tablet, Oral, Daily    Calcium Carb-Cholecalciferol (CALCIUM 600+D3 PO) 1 tablet, Oral, Daily    Cholecalciferol (VITAMIN D) 2000 UNITS capsule Oral    ciprofloxacin (CIPRO) 500 mg, Oral, 2 Times Daily    estradiol (ESTRACE) 1 mg, Oral, Daily    levothyroxine (SYNTHROID, LEVOTHROID) 75 mcg, Oral, Daily    loratadine-pseudoephedrine (Allergy Relief/Nasal Decongest)  MG per 24 hr tablet 1 tablet, Oral, Daily    Multiple Vitamins-Minerals (MULTIVITAMIN ADULT PO) 1 tablet, Oral, Daily    topiramate (TOPAMAX) 50 mg, Oral, 2 Times Daily    valACYclovir (VALTREX) 1000 MG tablet Take 1 tablet by mouth once daily      Return in about 6 months (around 3/12/2025) for Recheck with TSH.    Electronically signed by: Trey Higuera MD  09/12/2024

## 2024-09-13 LAB — TSH SERPL DL<=0.05 MIU/L-ACNC: 0.7 UIU/ML (ref 0.27–4.2)

## 2024-09-24 RX ORDER — VALACYCLOVIR HYDROCHLORIDE 1 G/1
TABLET, FILM COATED ORAL
Qty: 90 TABLET | Refills: 0 | Status: SHIPPED | OUTPATIENT
Start: 2024-09-24

## 2024-09-30 RX ORDER — LORATADINE AND PSEUDOEPHEDRINE SULFATE 10; 240 MG/1; MG/1
1 TABLET, FILM COATED, EXTENDED RELEASE ORAL DAILY
Qty: 90 TABLET | Refills: 1 | Status: SHIPPED | OUTPATIENT
Start: 2024-09-30

## 2024-10-29 ENCOUNTER — TELEPHONE (OUTPATIENT)
Dept: OBSTETRICS AND GYNECOLOGY | Facility: CLINIC | Age: 54
End: 2024-10-29
Payer: COMMERCIAL

## 2024-10-29 RX ORDER — NITROFURANTOIN 25; 75 MG/1; MG/1
CAPSULE ORAL
Qty: 30 CAPSULE | Refills: 1 | Status: SHIPPED | OUTPATIENT
Start: 2024-10-29

## 2024-10-29 NOTE — TELEPHONE ENCOUNTER
"  \"What medication is she referring to?  (I don't see it on her chart, unless she is referring to an antiviral?)\" DOMITILA Thornton    Pt reports the medication is Macrobid.     Pt would like to also schedule her follow up for an annual with Dr. Hernandez as right now she is scheduled with DOMITILA Thornton. She would like it at the same time/ day if available for her appt as what is currently scheduled if possible.  "

## 2024-10-29 NOTE — TELEPHONE ENCOUNTER
I called to discuss with pt.  She uses the Macrobid 100 mg just 1 po prn any sexual activity, prophy.  (#30) with 1 refill given.

## 2024-11-04 ENCOUNTER — OFFICE VISIT (OUTPATIENT)
Dept: FAMILY MEDICINE CLINIC | Facility: CLINIC | Age: 54
End: 2024-11-04
Payer: COMMERCIAL

## 2024-11-04 VITALS
TEMPERATURE: 97.8 F | HEART RATE: 77 BPM | OXYGEN SATURATION: 99 % | DIASTOLIC BLOOD PRESSURE: 80 MMHG | SYSTOLIC BLOOD PRESSURE: 122 MMHG | RESPIRATION RATE: 16 BRPM | BODY MASS INDEX: 22.33 KG/M2 | WEIGHT: 134 LBS | HEIGHT: 65 IN

## 2024-11-04 DIAGNOSIS — M77.11 RIGHT LATERAL EPICONDYLITIS: Primary | ICD-10-CM

## 2024-11-04 DIAGNOSIS — I83.90 SPIDER VEIN OF LOWER EXTREMITY: ICD-10-CM

## 2024-11-04 PROCEDURE — 99213 OFFICE O/P EST LOW 20 MIN: CPT | Performed by: FAMILY MEDICINE

## 2024-11-04 RX ORDER — NAPROXEN 500 MG/1
500 TABLET ORAL 2 TIMES DAILY WITH MEALS
Qty: 60 TABLET | Refills: 0 | Status: SHIPPED | OUTPATIENT
Start: 2024-11-04

## 2024-11-04 NOTE — PROGRESS NOTES
Subjective   Ivis Mcginnis is a 54 y.o. female.     History of Present Illness     Her right elbow pain has been ongoing for the past several months  Hurts to use her hand, hurts to grab and hold things  No particular injury that she is aware of        Review of Systems    Objective   Physical Exam  Vitals and nursing note reviewed.   Constitutional:       General: She is not in acute distress.     Appearance: Normal appearance. She is well-developed.   Cardiovascular:      Rate and Rhythm: Normal rate and regular rhythm.      Heart sounds: Normal heart sounds.   Pulmonary:      Effort: Pulmonary effort is normal.      Breath sounds: Normal breath sounds.   Musculoskeletal:        Arms:    Neurological:      Mental Status: She is alert and oriented to person, place, and time.   Psychiatric:         Mood and Affect: Mood normal.         Behavior: Behavior normal.         Thought Content: Thought content normal.         Judgment: Judgment normal.         Assessment & Plan   Diagnoses and all orders for this visit:    1. Right lateral epicondylitis (Primary)  -     naproxen (Naprosyn) 500 MG tablet; Take 1 tablet by mouth 2 (Two) Times a Day With Meals.  Dispense: 60 tablet; Refill: 0    2. Spider vein of lower extremity  -     Ambulatory Referral to Vascular Surgery      Discussed NSAID< home PT (written instructions given) and ice at end of day.  Consider tennis elbow splint as well below where pain is.  Consider formal PT if pain does not improve.  Pt agrees and will call back if this is needed    Ok vascular for small spider veins.  They can discuss treatment options with her.            Qbrexza Counseling:  I discussed with the patient the risks of Qbrexza including but not limited to headache, mydriasis, blurred vision, dry eyes, nasal dryness, dry mouth, dry throat, dry skin, urinary hesitation, and constipation.  Local skin reactions including erythema, burning, stinging, and itching can also occur.

## 2024-12-12 ENCOUNTER — TELEPHONE (OUTPATIENT)
Dept: FAMILY MEDICINE CLINIC | Facility: CLINIC | Age: 54
End: 2024-12-12
Payer: COMMERCIAL

## 2024-12-12 ENCOUNTER — OFFICE VISIT (OUTPATIENT)
Dept: FAMILY MEDICINE CLINIC | Facility: CLINIC | Age: 54
End: 2024-12-12
Payer: COMMERCIAL

## 2024-12-12 VITALS
HEART RATE: 88 BPM | BODY MASS INDEX: 22.66 KG/M2 | HEIGHT: 65 IN | WEIGHT: 136 LBS | RESPIRATION RATE: 12 BRPM | TEMPERATURE: 97.7 F | OXYGEN SATURATION: 100 % | DIASTOLIC BLOOD PRESSURE: 62 MMHG | SYSTOLIC BLOOD PRESSURE: 102 MMHG

## 2024-12-12 DIAGNOSIS — N39.0 ACUTE UTI: ICD-10-CM

## 2024-12-12 DIAGNOSIS — N39.0 RECURRENT UTI: ICD-10-CM

## 2024-12-12 DIAGNOSIS — R35.0 FREQUENCY OF URINATION: Primary | ICD-10-CM

## 2024-12-12 LAB
BILIRUB BLD-MCNC: ABNORMAL MG/DL
CLARITY, POC: CLEAR
COLOR UR: ABNORMAL
EXPIRATION DATE: ABNORMAL
GLUCOSE UR STRIP-MCNC: ABNORMAL MG/DL
KETONES UR QL: ABNORMAL
LEUKOCYTE EST, POC: ABNORMAL
Lab: ABNORMAL
NITRITE UR-MCNC: POSITIVE MG/ML
PH UR: 5 [PH] (ref 5–8)
PROT UR STRIP-MCNC: ABNORMAL MG/DL
RBC # UR STRIP: ABNORMAL /UL
SP GR UR: 1.02 (ref 1–1.03)
UROBILINOGEN UR QL: ABNORMAL

## 2024-12-12 PROCEDURE — 81003 URINALYSIS AUTO W/O SCOPE: CPT | Performed by: PHYSICIAN ASSISTANT

## 2024-12-12 PROCEDURE — 99213 OFFICE O/P EST LOW 20 MIN: CPT | Performed by: PHYSICIAN ASSISTANT

## 2024-12-12 RX ORDER — CIPROFLOXACIN 500 MG/1
500 TABLET, FILM COATED ORAL 2 TIMES DAILY
Qty: 14 TABLET | Refills: 0 | Status: SHIPPED | OUTPATIENT
Start: 2024-12-12 | End: 2024-12-19

## 2024-12-12 NOTE — PROGRESS NOTES
"Subjective   Ivis Mcginnis is a 54 y.o. female.     History of Present Illness   History of Present Illness  The patient presents for evaluation of recurrent urinary tract infections.    She has been experiencing recurrent UTIs, with the most recent episode occurring this morning. She reports that her symptoms initially resolved but subsequently recurred. She has been managing her symptoms with AZO and has noticed a pattern of these episodes occurring post-coital. Despite attempts at self-management with vaginal support supplements and cranberry pills, the infections persist. She has expressed a desire for a referral to a urologist due to the frequency of these episodes. She has previously been prescribed Cipro, which was ineffective, and Macrobid, which she found more beneficial. She occasionally uses lubricants and is currently attempting to discontinue hormone therapy. She reports no symptoms of prolapse such as bulging or pressure outside of an infection. She is not allergic to sulfa.    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 She has not experienced hot flashes.    ALLERGIES  The patient has no known allergies.    MEDICATIONS  Current: Macrobid, AZO  Past: Cipro       The following portions of the patient's history were reviewed and updated as appropriate: allergies, current medications, past family history, past medical history, past social history, past surgical history, and problem list.    Review of Systems    Objective   Blood pressure 102/62, pulse 88, temperature 97.7 °F (36.5 °C), resp. rate 12, height 165.1 cm (65\"), weight 61.7 kg (136 lb), SpO2 100%, not currently breastfeeding. Body mass index is 22.63 kg/m².     Physical Exam    Results  Laboratory Studies  Urine culture shows growth of a different bacteria.    Assessment & Plan   Assessment & Plan  1. Recurrent urinary tract infections.  The patient's " urine culture indicates the presence of a different bacterial strain, raising concerns about the efficacy of her current prophylactic Macrobid regimen. The potential for her body to develop resistance to Macrobid is also a concern. She has been informed that her recurrent UTIs are not a result of poor hygiene but rather due to anatomical proximity of the urethra and vagina. The use of cranberry supplements was discussed, highlighting their role in increasing urine acidity and thus creating a hostile environment for bacteria. She is advised to continue her calcium and vitamin D supplements, ensuring they are taken at least 2 hours apart from her antibiotic to facilitate optimal absorption. She is also encouraged to explore pre and probiotics specifically designed for women's health. A urine culture will be sent for further analysis. A prescription for a 7-day course of Cipro will be provided. A referral to a urologist will be made for further evaluation. If the culture results indicate a recurrent infection, the prophylactic treatment may need to be switched from Macrobid to Cipro.    PROCEDURE  The patient underwent a bone marrow transplant 15 years ago.     Diagnoses and all orders for this visit:    1. Frequency of urination (Primary)  -     POCT urinalysis dipstick, automated  -     Urine Culture - Urine, Urine, Clean Catch    2. Acute UTI  -     Urine Culture - Urine, Urine, Clean Catch  -     ciprofloxacin (Cipro) 500 MG tablet; Take 1 tablet by mouth 2 (Two) Times a Day for 7 days.  Dispense: 14 tablet; Refill: 0    3. Recurrent UTI  -     Ambulatory Referral to Urology               Patient or patient representative verbalized consent for the use of Ambient Listening during the visit with  GAYLE Freedman for chart documentation. 12/24/2024  13:07 EST

## 2024-12-14 LAB
BACTERIA UR CULT: NORMAL
BACTERIA UR CULT: NORMAL

## 2024-12-16 RX ORDER — VALACYCLOVIR HYDROCHLORIDE 1 G/1
TABLET, FILM COATED ORAL
Qty: 90 TABLET | Refills: 0 | Status: SHIPPED | OUTPATIENT
Start: 2024-12-16

## 2025-03-06 ENCOUNTER — OFFICE VISIT (OUTPATIENT)
Dept: ENDOCRINOLOGY | Facility: CLINIC | Age: 55
End: 2025-03-06
Payer: COMMERCIAL

## 2025-03-06 VITALS
HEART RATE: 90 BPM | BODY MASS INDEX: 21.69 KG/M2 | OXYGEN SATURATION: 100 % | DIASTOLIC BLOOD PRESSURE: 60 MMHG | WEIGHT: 130.2 LBS | SYSTOLIC BLOOD PRESSURE: 108 MMHG | HEIGHT: 65 IN

## 2025-03-06 DIAGNOSIS — E03.9 ACQUIRED HYPOTHYROIDISM: Primary | Chronic | ICD-10-CM

## 2025-03-06 LAB — TSH SERPL DL<=0.05 MIU/L-ACNC: 0.71 UIU/ML (ref 0.27–4.2)

## 2025-03-06 PROCEDURE — 84443 ASSAY THYROID STIM HORMONE: CPT | Performed by: INTERNAL MEDICINE

## 2025-03-06 RX ORDER — NITROGLYCERIN 20 MG/G
0.5 OINTMENT TOPICAL DAILY
COMMUNITY
Start: 2025-02-06

## 2025-03-06 RX ORDER — AMMONIUM LACTATE 12 G/100G
LOTION TOPICAL AS NEEDED
COMMUNITY
Start: 2025-02-04

## 2025-03-06 NOTE — PROGRESS NOTES
"     Office Note      Date: 2025  Patient Name: Ivis Mcginnis  MRN: [id number]  : 1970    Chief Complaint   Patient presents with    Hypothyroidism       History of Present Illness:   Ivis Mcginnis is a 54 y.o. female who presents for Hypothyroidism    She remains on T4 75mcg qd. She is taking this correctly. She isn't taking any interfering meds concurrently.  She hasn't noted any change in the size of her neck. She denies any compressive sxs. She denies any sxs of hypo- or hyperthyroidism at this time.    Subjective      Review of Systems:   Review of Systems   Constitutional: Negative.    Cardiovascular: Negative.    Gastrointestinal: Negative.    Endocrine: Negative.        The following portions of the patient's history were reviewed and updated as appropriate: allergies, current medications, past family history, past medical history, past social history, past surgical history, and problem list.    Objective     Visit Vitals  /60 (BP Location: Right arm, Patient Position: Sitting)   Pulse 90   Ht 165.1 cm (65\")   Wt 59.1 kg (130 lb 3.2 oz)   SpO2 100%   BMI 21.67 kg/m²       Physical Exam:  Physical Exam  Constitutional:       Appearance: Normal appearance.   Neck:      Thyroid: No thyroid mass, thyromegaly or thyroid tenderness.   Lymphadenopathy:      Cervical: No cervical adenopathy.   Neurological:      Mental Status: She is alert.         Labs:    TSH  No results found for: \"TSHBASE\"     Free T4  Free T4   Date Value Ref Range Status   2021 1.39 0.93 - 1.70 ng/dL Final       T3  No results found for: \"M2LVEUA\"      TPO  No results found for: \"THYROIDAB\"    TG AB  No results found for: \"THGAB\"    TG  No results found for: \"THYROGLB\"    CBC w/DIFF  Lab Results   Component Value Date    WBC 5.31 2023    RBC 3.90 2023    HGB 14.3 2023    HCT 42.2 2023    .2 (H) 2023    MCH 36.7 (H) 2023    MCHC 33.9 2023    RDW 12.3 2023    " RDWSD 46.9 07/13/2022    MPV 10.3 07/13/2022     07/06/2023    NEUTRORELPCT CANCELED 07/06/2023    LYMPHORELPCT CANCELED 07/06/2023    MONORELPCT CANCELED 07/06/2023    EOSRELPCT CANCELED 07/06/2023    BASORELPCT 0.0 08/04/2021    AUTOIGPER 0.0 08/04/2021    NEUTROABS 2.47 07/06/2023    LYMPHSABS CANCELED 07/06/2023    LYMPHSABS 2.15 07/06/2023    MONOSABS 0.48 07/06/2023    EOSABS CANCELED 07/06/2023    EOSABS 3.0 07/06/2023    EOSABS 0.16 07/06/2023    BASOSABS CANCELED 07/06/2023    BASOSABS 0.05 07/06/2023    AUTOIGNUM 0.01 08/04/2021    NRBC 0.0 08/04/2021           Assessment / Plan      Assessment & Plan:  Diagnoses and all orders for this visit:    1. Acquired hypothyroidism (Primary)  Assessment & Plan:  Continue levothyroxine.  Check TSH today.    Orders:  -     TSH; Future      Current Outpatient Medications   Medication Instructions    ammonium lactate (LAC-HYDRIN) 12 % lotion As Needed    ascorbic acid (VITAMIN C) 1,000 mg, Daily    Biotin 57869 MCG tablet 1 tablet, Daily    Calcium Carb-Cholecalciferol (CALCIUM 600+D3 PO) 1 tablet, Daily    Cholecalciferol (VITAMIN D) 2000 UNITS capsule Take  by mouth.    estradiol (ESTRACE) 1 mg, Oral, Daily    levothyroxine (SYNTHROID, LEVOTHROID) 75 mcg, Oral, Daily    loratadine-pseudoephedrine (Allergy Relief/Nasal Decongest)  MG per 24 hr tablet 1 tablet, Oral, Daily    Multiple Vitamins-Minerals (MULTIVITAMIN ADULT PO) 1 tablet, Daily    naproxen (NAPROSYN) 500 mg, Oral, 2 Times Daily With Meals    Nitro-Bid 2 % ointment 0.5 inches, Daily    nitrofurantoin, macrocrystal-monohydrate, (Macrobid) 100 MG capsule 1 po prn sexual activity (prophylactic, to prevent UTIs)    topiramate (TOPAMAX) 50 mg, Oral, 2 Times Daily    valACYclovir (VALTREX) 1000 MG tablet Take 1 tablet by mouth once daily      Return in about 6 months (around 9/6/2025) for Recheck with TSH.    Electronically signed by: Trey Higuera MD  03/06/2025

## 2025-03-17 RX ORDER — VALACYCLOVIR HYDROCHLORIDE 1 G/1
1000 TABLET, FILM COATED ORAL DAILY
Qty: 90 TABLET | Refills: 0 | Status: SHIPPED | OUTPATIENT
Start: 2025-03-17

## 2025-03-18 RX ORDER — LORATADINE AND PSEUDOEPHEDRINE SULFATE 10; 240 MG/1; MG/1
1 TABLET, FILM COATED, EXTENDED RELEASE ORAL DAILY
Qty: 90 TABLET | Refills: 1 | Status: SHIPPED | OUTPATIENT
Start: 2025-03-18 | End: 2025-03-18 | Stop reason: SDUPTHER

## 2025-03-19 RX ORDER — LORATADINE AND PSEUDOEPHEDRINE SULFATE 10; 240 MG/1; MG/1
1 TABLET, FILM COATED, EXTENDED RELEASE ORAL DAILY
Qty: 90 TABLET | Refills: 1 | Status: SHIPPED | OUTPATIENT
Start: 2025-03-19

## 2025-05-27 RX ORDER — VALACYCLOVIR HYDROCHLORIDE 1 G/1
1000 TABLET, FILM COATED ORAL DAILY
Qty: 90 TABLET | Refills: 0 | Status: SHIPPED | OUTPATIENT
Start: 2025-05-27

## 2025-05-29 NOTE — TELEPHONE ENCOUNTER
Caller: Ivis Mcginnis    Relationship: Self    Best call back number: [phone]    Requested Prescriptions:   Requested Prescriptions     Pending Prescriptions Disp Refills    estradiol (Estrace) 1 MG tablet 90 tablet 3     Sig: Take 1 tablet by mouth Daily.        Pharmacy where request should be sent:  [facility] @ [place]    Last office visit with prescribing clinician: Visit date not found   Last telemedicine visit with prescribing clinician: Visit date not found     Next office visit with prescribing clinician: APPT ON 6-24 FOR ANNUAL ([facility])    Additional details provided by patient: SHE HAS ABOUT 3 DAYS LEFT    Does the patient have less than a 3 day supply:  [x] Yes  [] No    Would you like a call back once the refill request has been completed: [x] Yes [] No    If the office needs to give you a call back, can they leave a voicemail: [x] Yes [] No    Laurent Francisco Rep   05/29/25 14:39 EDT

## 2025-05-30 RX ORDER — ESTRADIOL 1 MG/1
1 TABLET ORAL DAILY
Qty: 30 TABLET | Refills: 0 | Status: SHIPPED | OUTPATIENT
Start: 2025-05-30

## 2025-06-09 ENCOUNTER — OFFICE VISIT (OUTPATIENT)
Dept: FAMILY MEDICINE CLINIC | Facility: CLINIC | Age: 55
End: 2025-06-09
Payer: COMMERCIAL

## 2025-06-09 VITALS
BODY MASS INDEX: 21.86 KG/M2 | DIASTOLIC BLOOD PRESSURE: 64 MMHG | SYSTOLIC BLOOD PRESSURE: 120 MMHG | OXYGEN SATURATION: 100 % | HEART RATE: 68 BPM | RESPIRATION RATE: 16 BRPM | HEIGHT: 65 IN | WEIGHT: 131.2 LBS | TEMPERATURE: 98 F

## 2025-06-09 DIAGNOSIS — N39.0 ACUTE UTI: Primary | ICD-10-CM

## 2025-06-09 LAB
BILIRUB BLD-MCNC: NEGATIVE MG/DL
CLARITY, POC: CLEAR
COLOR UR: YELLOW
GLUCOSE UR STRIP-MCNC: NEGATIVE MG/DL
KETONES UR QL: NEGATIVE
LEUKOCYTE EST, POC: ABNORMAL
NITRITE UR-MCNC: NEGATIVE MG/ML
PH UR: 6 [PH] (ref 5–8)
PROT UR STRIP-MCNC: ABNORMAL MG/DL
RBC # UR STRIP: ABNORMAL /UL
SP GR UR: 1.02 (ref 1–1.03)
UROBILINOGEN UR QL: ABNORMAL

## 2025-06-09 PROCEDURE — 99213 OFFICE O/P EST LOW 20 MIN: CPT | Performed by: FAMILY MEDICINE

## 2025-06-09 PROCEDURE — 81002 URINALYSIS NONAUTO W/O SCOPE: CPT | Performed by: FAMILY MEDICINE

## 2025-06-09 RX ORDER — CIPROFLOXACIN 500 MG/1
500 TABLET, FILM COATED ORAL 2 TIMES DAILY
Qty: 14 TABLET | Refills: 0 | Status: SHIPPED | OUTPATIENT
Start: 2025-06-09

## 2025-06-09 NOTE — PROGRESS NOTES
Chief Complaint  Urinary Tract Infection (Painful urination, urgency, started today. )    Subjective          Ivis Mcginnis presents to [facility]  Urinary Tract Infection  Chronicity:  New  Onset:  Today  Pain quality:  Burning  Fever:  No fever  Associated symptoms: urgency    Associated symptoms comment:  Low back pain  Treatments tried: macrobid.  Improvement on treatment:  No relief  PMH includes: recurrent UTIs          The following portions of the patient's history were reviewed and updated as appropriate: allergies, current medications, past family history, past medical history, past social history, past surgical history, and problem list.    Objective      Physical Exam  Vitals and nursing note reviewed.   Constitutional:       Appearance: Normal appearance.   Pulmonary:      Effort: Pulmonary effort is normal. No respiratory distress.   Neurological:      Mental Status: She is alert.        Result Review :                Assessment and Plan    Diagnoses and all orders for this visit:    1. Acute UTI (Primary)  -     POC Urinalysis Dipstick  -     ciprofloxacin (Cipro) 500 MG tablet; Take 1 tablet by mouth 2 (Two) Times a Day.  Dispense: 14 tablet; Refill: 0  -     Urine Culture - , Urine, Clean Catch    Abnormal UA, will culture. She has recurrent UTI, following with urology. Hold macrobid while taking Cipro, ok to resume once Cipro is complete.       Follow Up   No follow-ups on file.  Patient was given instructions and counseling regarding her condition or for health maintenance advice. Please see specific information pulled into the AVS if appropriate.

## 2025-06-12 LAB
BACTERIA UR CULT: ABNORMAL
BACTERIA UR CULT: ABNORMAL
OTHER ANTIBIOTIC SUSC ISLT: ABNORMAL

## 2025-06-24 ENCOUNTER — OFFICE VISIT (OUTPATIENT)
Dept: OBSTETRICS AND GYNECOLOGY | Facility: CLINIC | Age: 55
End: 2025-06-24
Payer: COMMERCIAL

## 2025-06-24 VITALS
HEIGHT: 65 IN | SYSTOLIC BLOOD PRESSURE: 128 MMHG | DIASTOLIC BLOOD PRESSURE: 82 MMHG | BODY MASS INDEX: 22.36 KG/M2 | WEIGHT: 134.2 LBS

## 2025-06-24 DIAGNOSIS — Z01.419 WELL WOMAN EXAM: Primary | ICD-10-CM

## 2025-06-24 DIAGNOSIS — N95.1 VASOMOTOR SYMPTOMS DUE TO MENOPAUSE: ICD-10-CM

## 2025-06-24 DIAGNOSIS — N84.2 VAGINAL POLYP: ICD-10-CM

## 2025-06-24 DIAGNOSIS — N89.8 VAGINAL DISCHARGE: ICD-10-CM

## 2025-06-24 PROCEDURE — 99396 PREV VISIT EST AGE 40-64: CPT

## 2025-06-24 RX ORDER — ESTRADIOL 0.5 MG/1
0.5 TABLET ORAL DAILY
Qty: 90 TABLET | Refills: 3 | Status: SHIPPED | OUTPATIENT
Start: 2025-06-24

## 2025-06-24 NOTE — PROGRESS NOTES
Subjective   Chief Complaint   Patient presents with    Annual Exam    Med Refill     Estradiol 1 mg (#90 with refills).  Radha Oleary.       Ivis Mcginnis is a 54 y.o. year old  who is post-menopausal.  She is S/P LAVH (for enlarged uterus, endometriosis)  presenting to be seen for her annual exam.  This past year she has been on hormone replacement therapy (since ).  There has not been vaginal bleeding in the last 12 months.  Menopausal symptoms are present (hot flashes and insomnia) but not affecting her quality of life .    Hypothyroidism compensated  History of bone marrow transplant for leukemia; now in remission.  History of osteopenia; she is intentional about calcium and vitamin D lots of walking every day.    No h/o MI, CVA, migraines w/ aura, non smoker. Had a blood clot in left arm (possibly related to port) - was on anticoagulant for a while, but not any longer. No clots since then.     Screening:   Pap Smear: No abnormal paps prior to hysterectomy  Colon cancer: colonoscopy 2024, repeat in 5 years  MM2024, BI-RADS 1  DEXA: 2022, repeat 2-3 years       SEXUAL Hx:  She is currently sexually active.  In the past year there there has been NO new sexual partners.    Condoms are never used.  She would not like to be screened for STD's at today's exam.  Ruffin is painful: no  HEALTH Hx:  She exercises regularly: yes.  She wears her seat belt: yes.  She has concerns about domestic violence: no.  OTHER THINGS SHE WANTS TO DISCUSS TODAY:  Nothing else    The following portions of the patient's history were reviewed and updated as appropriate:problem list, current medications, allergies, past family history, past medical history, past social history, and past surgical history.    Social History    Tobacco Use      Smoking status: Never      Smokeless tobacco: Never      Review of Systems   Constitutional: Negative.  Negative for unexpected weight change.   Respiratory: Negative.   "Negative for chest tightness and shortness of breath.    Cardiovascular: Negative.  Negative for chest pain.   Gastrointestinal: Negative.  Negative for constipation and diarrhea.   Endocrine: Negative.    Genitourinary:  Negative for difficulty urinating, dysuria, frequency, menstrual problem, pelvic pain, urgency, vaginal bleeding and vaginal discharge.        Recurrent UTIs, no symptoms today  No breast concerns   Skin: Negative.    Psychiatric/Behavioral: Negative.            Objective   /82   Ht 165.1 cm (65\")   Wt 60.9 kg (134 lb 3.2 oz)   Breastfeeding No   BMI 22.33 kg/m²     Physical Exam  Vitals and nursing note reviewed. Exam conducted with a chaperone present.   Constitutional:       Appearance: Normal appearance. She is not ill-appearing.   HENT:      Head: Normocephalic and atraumatic.   Eyes:      General: No scleral icterus.  Neck:      Comments: Normal thyroid  Pulmonary:      Effort: Pulmonary effort is normal. No respiratory distress.   Chest:   Breasts:     Breasts are symmetrical.      Right: Normal.      Left: Normal.   Abdominal:      General: There is no distension.      Palpations: Abdomen is soft. There is no mass.      Tenderness: There is no abdominal tenderness. There is no guarding.      Hernia: No hernia is present.   Genitourinary:     General: Normal vulva.      Exam position: Lithotomy position.      Labia:         Right: No rash, tenderness or lesion.         Left: No rash, tenderness or lesion.       Urethra: No urethral lesion.      Vagina: Vaginal discharge (thick, white, large amount) and lesions (polyp) present.      Cervix: Normal.      Uterus: Normal.       Adnexa: Right adnexa normal and left adnexa normal.      Rectum: Normal. No external hemorrhoid.      Comments: Digital rectal exam deferred, appears visually normal  Vaginal polyp on right vaginal wall - removed easily with ring forceps and sent to pathology.   Musculoskeletal:         General: No swelling. " Normal range of motion.      Cervical back: Normal range of motion.      Right lower leg: No edema.      Left lower leg: No edema.   Lymphadenopathy:      Upper Body:      Right upper body: No supraclavicular, axillary or pectoral adenopathy.      Left upper body: No supraclavicular, axillary or pectoral adenopathy.   Skin:     General: Skin is warm and dry.   Neurological:      General: No focal deficit present.      Mental Status: She is alert and oriented to person, place, and time.   Psychiatric:         Mood and Affect: Mood normal.         Behavior: Behavior normal.         Thought Content: Thought content normal.         Judgment: Judgment normal.            Vaginal Polypectomy    Date/Time: 6/24/2025 2:38 PM    Performed by: Petra Burrows APRN  Authorized by: Petra Burrows APRN  Local anesthesia used: no    Anesthesia:  Local anesthesia used: no    Sedation:  Patient sedated: no    Patient tolerance: patient tolerated the procedure well with no immediate complications  Comments: Polyp noted on right vaginal wall. Discussed risks vs benefits of removal to send for pathology. Patient provided verbal and written informed consent. Speculum inserted and polyp visualized. Polyp grasped with ring forceps. Forceps rotated until polyp  from vaginal wall. No bleeding noted. Specimen labeled and sent to pathology.           Assessment & Plan   Assessment   Well woman exam  Vaginal discharge   Vaginal polyp  Osteopenia  Menopausal female currently not on HRT - without significant symptoms affecting activities of daily living  She is up to date on all relevant gynecologic and colorectal screenings except mammography and DEXA's for osteoporosis     Plan   Pap was not done today.  I explained to Ivis that the Pap smears are no longer recommended in patient's after hysterectomy.   I stressed to Ivis that she still should be seen to be seen yearly for a full physical including breast and pelvic  exam.  Oneswab for vaginal discharge c/w yeast infection (recent antibiotic use).  Vaginal polyp removed with ring forceps today - sent for pathology  Data from the women's health initiative study was reviewed.  With Premarin versus placebo, it was explained that there was no significant difference in the rates of stroke, heart attack or breast cancer until greater than 5 years of use.  The magnitude of risk after 5 years of use was approximately 7 additional cases of stroke per 10,000 women years of use.  Furthermore, data is only available for Premarin.  Any extrapolation to other forms of hormone therapy cannot accurately say whether the risks are equal, less for more than with the medications studied.  Ultimately, if she wishes to use hormone replacement therapy, the goal would be to try to reduce it to the lowest possible dose.  Preferably, the goal would be total withdrawal of systemic hormone therapy by 5 years.  There is no good prospective data to quantify the risk for use of ERT for greater than 6 years.   Discussed that her blood clot was most likely related to implanted device rather than a predisposition to clots, but her history of a clot in general could increase her risk with ERT. Patient understands risks and would like to try to lower dose and possibly wean off completely. Discussed ED precautions for DVT/PE.  She was encouraged to get yearly mammograms.  She should report any palpable breast lump(s) or skin changes regardless of mammographic findings.  I explained to Ivis that notification regarding her mammogram results will come from the center performing the study.  Our office will not be routinely calling with mammogram results.  It is her responsibility to make sure that the results from the mammogram are communicated to her by the breast center.  If she has any questions about the results, she is welcome to call our office anytime.  Bone density testing was recommended.  I reviewed with Ivis  that it was always most advisable for all bone density tests for each patient to be done on the same machine over time.  The purpose of this is to improve the accuracy of the interpretation of serial studies.  I discussed with Ivis that she may be behind on needed vaccinations for COVID booster / COVID bivalent booster.  She may be able to obtain these vaccinations at her local pharmacy OR speak about obtaining them with her primary care.  If she does obtain her vaccines, I have asked Ivis to let us know the date each vaccine was obtained so that her medical record could be updated in our system.  The importance of keeping all planned follow-up and taking all medications as prescribed was emphasized.  Follow up for annual exam 1 day    New Medications Ordered This Visit   Medications    estradiol (ESTRACE) 0.5 MG tablet     Sig: Take 1 tablet by mouth Daily.     Dispense:  90 tablet     Refill:  3          Return in about 1 year (around 6/24/2026) for Annual physical.    This note was electronically signed.    Petra Burrows, APRN  June 24, 2025

## 2025-06-25 LAB — REF LAB TEST METHOD: NORMAL

## 2025-06-30 ENCOUNTER — HOSPITAL ENCOUNTER (OUTPATIENT)
Dept: BONE DENSITY | Facility: HOSPITAL | Age: 55
Discharge: HOME | End: 2025-06-30
Payer: COMMERCIAL

## 2025-06-30 DIAGNOSIS — Z01.419 WELL WOMAN EXAM: ICD-10-CM

## 2025-06-30 PROCEDURE — 77080 DXA BONE DENSITY AXIAL: CPT

## 2025-07-22 ENCOUNTER — TELEPHONE (OUTPATIENT)
Dept: OBSTETRICS AND GYNECOLOGY | Facility: CLINIC | Age: 55
End: 2025-07-22
Payer: COMMERCIAL

## 2025-07-22 NOTE — TELEPHONE ENCOUNTER
Pt calling advised flaquita told her to call if having issues with night sweats and that there is an alternative - patient statesshe needs something - it has gotten much worse   Please call and advise her what the plan is

## 2025-07-25 ENCOUNTER — TELEPHONE (OUTPATIENT)
Dept: OBSTETRICS AND GYNECOLOGY | Facility: CLINIC | Age: 55
End: 2025-07-25
Payer: COMMERCIAL

## 2025-07-25 NOTE — TELEPHONE ENCOUNTER
Pt wanted to let you know that she is holding off on the medication that were suggested at this time  She is willing to consider things that do not require a prescription.  Please advise

## 2025-08-27 RX ORDER — LEVOTHYROXINE SODIUM 75 UG/1
75 TABLET ORAL DAILY
Qty: 90 TABLET | Refills: 3 | Status: SHIPPED | OUTPATIENT
Start: 2025-08-27